# Patient Record
Sex: MALE | Race: WHITE | NOT HISPANIC OR LATINO | Employment: OTHER | URBAN - METROPOLITAN AREA
[De-identification: names, ages, dates, MRNs, and addresses within clinical notes are randomized per-mention and may not be internally consistent; named-entity substitution may affect disease eponyms.]

---

## 2022-08-29 ENCOUNTER — OFFICE VISIT (OUTPATIENT)
Dept: FAMILY MEDICINE CLINIC | Facility: CLINIC | Age: 82
End: 2022-08-29
Payer: COMMERCIAL

## 2022-08-29 VITALS
SYSTOLIC BLOOD PRESSURE: 138 MMHG | DIASTOLIC BLOOD PRESSURE: 84 MMHG | RESPIRATION RATE: 16 BRPM | WEIGHT: 161.4 LBS | HEART RATE: 80 BPM | TEMPERATURE: 96.3 F | HEIGHT: 64 IN | OXYGEN SATURATION: 99 % | BODY MASS INDEX: 27.55 KG/M2

## 2022-08-29 DIAGNOSIS — I10 ESSENTIAL HYPERTENSION: ICD-10-CM

## 2022-08-29 DIAGNOSIS — F41.9 ANXIETY: ICD-10-CM

## 2022-08-29 DIAGNOSIS — E78.49 OTHER HYPERLIPIDEMIA: ICD-10-CM

## 2022-08-29 DIAGNOSIS — S01.01XA LACERATION OF SCALP, INITIAL ENCOUNTER: Primary | ICD-10-CM

## 2022-08-29 PROCEDURE — 1100F PTFALLS ASSESS-DOCD GE2>/YR: CPT | Performed by: NURSE PRACTITIONER

## 2022-08-29 PROCEDURE — 1160F RVW MEDS BY RX/DR IN RCRD: CPT | Performed by: NURSE PRACTITIONER

## 2022-08-29 PROCEDURE — 3288F FALL RISK ASSESSMENT DOCD: CPT | Performed by: NURSE PRACTITIONER

## 2022-08-29 PROCEDURE — 3725F SCREEN DEPRESSION PERFORMED: CPT | Performed by: NURSE PRACTITIONER

## 2022-08-29 PROCEDURE — 99203 OFFICE O/P NEW LOW 30 MIN: CPT | Performed by: NURSE PRACTITIONER

## 2022-08-29 RX ORDER — ATORVASTATIN CALCIUM 80 MG/1
80 TABLET, FILM COATED ORAL DAILY
COMMUNITY
End: 2022-09-13 | Stop reason: SDUPTHER

## 2022-08-29 NOTE — PROGRESS NOTES
Assessment/Plan:    1  Laceration of scalp, initial encounter  -     Suture removal    2  Essential hypertension  Comments:  stable with current regimen    3  Anxiety  Comments:  stable    4  Other hyperlipidemia  Comments:  complaint with statin and tolerating it well        Patient Instructions:  Supportive care discussed and advised  Advised to RTO for any worsening and no improvement  Follow up for no improvement and worsening of conditions  Patient advised and educated when to see immediate medical care  Keep area clean and dry  Return if symptoms worsen or fail to improve  Future Appointments   Date Time Provider Patricia Beltrán   9/13/2022  1:30 PM DO JENNIE Carreon [facility]           Subjective:      Patient ID: Gisselle Ariza is a 80 y o  male  Chief Complaint   Patient presents with    Establish Care     Nm lpn    Suture / Staple Removal     Staple removal in head         Vitals:  /84   Pulse 80   Temp (!) 96 3 °F (35 7 °C)   Resp 16   Ht 5' 4" (1 626 m)   Wt 73 2 kg (161 lb 6 4 oz)   SpO2 99%   BMI 27 70 kg/m²     HPI  New to practice  Personal and family medical history reviewed  Establishing care with Dr Harriett Ramsey on 9/13/2022  Patient fell on 9/21/2022 in parking lot as was trying to pick his wife and had laceration on scalp and 5 stapels placed on scalp and patient is here for removal  Denies any headache, dizziness and discharge from area  Complaint with his chronic medications and tolerating it well            PHQ-2/9 Depression Screening    Little interest or pleasure in doing things: 0 - not at all  Feeling down, depressed, or hopeless: 0 - not at all  PHQ-2 Score: 0  PHQ-2 Interpretation: Negative depression screen             The following portions of the patient's history were reviewed and updated as appropriate: allergies, current medications, past family history, past medical history, past social history, past surgical history and problem list       Review of Systems   Constitutional: Negative  Respiratory: Negative  Cardiovascular: Negative  Genitourinary: Negative  Skin: Positive for wound (laceration)  As noted in HPI     Neurological: Negative  Objective:    Social History     Tobacco Use   Smoking Status Former Smoker   Smokeless Tobacco Never Used       Allergies: No Known Allergies      Current Outpatient Medications   Medication Sig Dispense Refill    ASPIRIN 81 PO Take by mouth      atorvastatin (LIPITOR) 80 mg tablet Take 80 mg by mouth daily      LOSARTAN POTASSIUM PO Take by mouth      Multiple Vitamins-Minerals (PRESERVISION AREDS 2 PO) Take by mouth      sertraline (ZOLOFT) 50 mg tablet Take 50 mg by mouth daily       No current facility-administered medications for this visit  Physical Exam  Constitutional:       Appearance: Normal appearance  HENT:      Head: Normocephalic  Nose: Nose normal    Eyes:      Conjunctiva/sclera: Conjunctivae normal    Cardiovascular:      Rate and Rhythm: Normal rate and regular rhythm  Heart sounds: Normal heart sounds  Pulmonary:      Effort: Pulmonary effort is normal       Breath sounds: Normal breath sounds  Musculoskeletal:         General: No swelling or tenderness  Normal range of motion  Skin:     General: Skin is warm and dry  Findings: Laceration (laceration on scalp with 5 stapel intact and removed without any difficulty in office and area is clean with scabs, and no wound dehiscence noted and healing well  No discharge noted) present  Neurological:      Mental Status: He is alert and oriented to person, place, and time  Psychiatric:         Mood and Affect: Mood normal          Behavior: Behavior normal          Thought Content:  Thought content normal          Judgment: Judgment normal                Suture removal    Date/Time: 8/29/2022 9:50 AM  Performed by: EVELYN Vitale  Authorized by: EVELYN Vitale Universal Protocol:  Consent: Verbal consent obtained  Risks and benefits: risks, benefits and alternatives were discussed  Consent given by: patient  Timeout called at: 8/29/2022 9:50 AM   Patient understanding: patient states understanding of the procedure being performed  Patient consent: the patient's understanding of the procedure matches consent given  Patient identity confirmed: verbally with patient        Patient location:  Clinic  Location:     Location:  [address] [facility] location:  [place]  Procedure details: Tools used: Other (comment) (stapel removal kit)    Wound appearance:  No sign(s) of infection and good wound healing    Number of staples removed:  6  Post-procedure details:     Post-removal:  Antibiotic ointment applied    Patient tolerance of procedure:   Tolerated well, no immediate complications          EVELYN Mendez

## 2022-08-29 NOTE — PATIENT INSTRUCTIONS
Supportive care discussed and advised  Advised to RTO for any worsening and no improvement  Follow up for no improvement and worsening of conditions  Patient advised and educated when to see immediate medical care  Keep area clean and dry

## 2022-09-07 ENCOUNTER — OFFICE VISIT (OUTPATIENT)
Dept: FAMILY MEDICINE CLINIC | Facility: CLINIC | Age: 82
End: 2022-09-07
Payer: COMMERCIAL

## 2022-09-07 VITALS
RESPIRATION RATE: 16 BRPM | DIASTOLIC BLOOD PRESSURE: 90 MMHG | HEART RATE: 78 BPM | TEMPERATURE: 97.5 F | OXYGEN SATURATION: 97 % | HEIGHT: 64 IN | BODY MASS INDEX: 27.49 KG/M2 | WEIGHT: 161 LBS | SYSTOLIC BLOOD PRESSURE: 150 MMHG

## 2022-09-07 DIAGNOSIS — M54.42 ACUTE BILATERAL LOW BACK PAIN WITH BILATERAL SCIATICA: Primary | ICD-10-CM

## 2022-09-07 DIAGNOSIS — M54.41 ACUTE BILATERAL LOW BACK PAIN WITH BILATERAL SCIATICA: Primary | ICD-10-CM

## 2022-09-07 DIAGNOSIS — I10 ESSENTIAL HYPERTENSION: ICD-10-CM

## 2022-09-07 DIAGNOSIS — E78.49 OTHER HYPERLIPIDEMIA: ICD-10-CM

## 2022-09-07 PROCEDURE — 99214 OFFICE O/P EST MOD 30 MIN: CPT | Performed by: NURSE PRACTITIONER

## 2022-09-07 PROCEDURE — 1101F PT FALLS ASSESS-DOCD LE1/YR: CPT | Performed by: NURSE PRACTITIONER

## 2022-09-07 PROCEDURE — 3725F SCREEN DEPRESSION PERFORMED: CPT | Performed by: NURSE PRACTITIONER

## 2022-09-07 RX ORDER — PREDNISONE 10 MG/1
TABLET ORAL
Qty: 20 TABLET | Refills: 0 | Status: SHIPPED | OUTPATIENT
Start: 2022-09-07 | End: 2022-09-17

## 2022-09-07 RX ORDER — BACLOFEN 10 MG/1
10 TABLET ORAL
Qty: 20 TABLET | Refills: 0 | Status: SHIPPED | OUTPATIENT
Start: 2022-09-07

## 2022-09-07 NOTE — PROGRESS NOTES
Assessment/Plan:    1  Acute bilateral low back pain with bilateral sciatica  -     predniSONE 10 mg tablet; 4 tabs x 2 days, 3 tabs x 2 days, 2 tabs x 2 days, 1 tab x 2 days  -     baclofen 10 mg tablet; Take 1 tablet (10 mg total) by mouth daily at bedtime    2  Essential hypertension  Comments:  BP slightly elevated but currently in pain and complaint with medications and follow up scheduled next week and will recheck    3  Other hyperlipidemia  Comments:  complaint with statin and tolerating it well            Patient Instructions: Take prednisone with food in morning and do not take any NSAID's while taking prednisone  Do not drive and operate any machinery after taking muscle relaxant  Supportive care discussed and advised  Advised to RTO for any worsening and no improvement  Follow up for no improvement and worsening of conditions  Patient advised and educated when to see immediate medical care  Return if symptoms worsen or fail to improve  Future Appointments   Date Time Provider Patricia Beltrán   9/13/2022  1:30 PM Epstein Come, DO [facility]           Subjective:      Patient ID: Caridad Paige is a 80 y o  male  Chief Complaint   Patient presents with    Back Pain     wmcma         Vitals:  /90   Pulse 78   Temp 97 5 °F (36 4 °C)   Resp 16   Ht 5' 4" (1 626 m)   Wt 73 kg (161 lb)   SpO2 97%   BMI 27 64 kg/m²     HPI  Patient stated that moved to new house and been lifting up heavy boxes and moving stuff around and started with lower back ache last week and pain radiating down to bilateral legs and also feels discomfort in bilateral calf's  Denies any numbness/tingling/weakness in any extremities  Denies any saddle anaesthesia and urinary/bowel issues  Complaint with medications for chronic illnesses and tolerating it well              PHQ-2/9 Depression Screening    Little interest or pleasure in doing things: 0 - not at all  Feeling down, depressed, or hopeless: 0 - not at all  PHQ-2 Score: 0  PHQ-2 Interpretation: Negative depression screen             The following portions of the patient's history were reviewed and updated as appropriate: allergies, current medications, past family history, past medical history, past social history, past surgical history and problem list       Review of Systems   Constitutional: Negative  HENT: Negative  Respiratory: Negative  Cardiovascular: Negative  Musculoskeletal: Positive for back pain  Neurological: Negative  Psychiatric/Behavioral: Negative  Objective:    Social History     Tobacco Use   Smoking Status Former Smoker   Smokeless Tobacco Never Used       Allergies: No Known Allergies      Current Outpatient Medications   Medication Sig Dispense Refill    ASPIRIN 81 PO Take by mouth      atorvastatin (LIPITOR) 80 mg tablet Take 80 mg by mouth daily      baclofen 10 mg tablet Take 1 tablet (10 mg total) by mouth daily at bedtime 20 tablet 0    LOSARTAN POTASSIUM PO Take by mouth      Multiple Vitamins-Minerals (PRESERVISION AREDS 2 PO) Take by mouth      predniSONE 10 mg tablet 4 tabs x 2 days, 3 tabs x 2 days, 2 tabs x 2 days, 1 tab x 2 days 20 tablet 0    sertraline (ZOLOFT) 50 mg tablet Take 50 mg by mouth daily       No current facility-administered medications for this visit  Physical Exam  Constitutional:       Appearance: Normal appearance  HENT:      Head: Normocephalic  Nose: Nose normal    Eyes:      Conjunctiva/sclera: Conjunctivae normal    Cardiovascular:      Rate and Rhythm: Normal rate and regular rhythm  Heart sounds: Normal heart sounds  Pulmonary:      Effort: Pulmonary effort is normal       Breath sounds: Normal breath sounds  Musculoskeletal:         General: Tenderness (mildly tender on lumbar spine area) present  No swelling  Normal range of motion  Skin:     General: Skin is warm and dry  Findings: No rash     Neurological:      Mental Status: He is alert and oriented to person, place, and time  Psychiatric:         Mood and Affect: Mood normal          Behavior: Behavior normal          Thought Content:  Thought content normal          Judgment: Judgment normal                      EVELYN Galaviz

## 2022-09-07 NOTE — PATIENT INSTRUCTIONS
Take prednisone with food in morning and do not take any NSAID's while taking prednisone  Do not drive and operate any machinery after taking muscle relaxant  Supportive care discussed and advised  Advised to RTO for any worsening and no improvement  Follow up for no improvement and worsening of conditions  Patient advised and educated when to see immediate medical care  Lower Back Exercises   WHAT YOU NEED TO KNOW:   Lower back exercises help heal and strengthen your back muscles to prevent another injury  Ask your healthcare provider if you need to see a physical therapist for more advanced exercises  DISCHARGE INSTRUCTIONS:   Return to the emergency department if:   You have severe pain that prevents you from moving  Contact your healthcare provider if:   Your pain becomes worse  You have new pain  You have questions or concerns about your condition or care  Do lower back exercises safely:   Do the exercises on a mat or firm surface  (not on a bed) to support your spine and prevent low back pain  Move slowly and smoothly  Avoid fast or jerky motions  Breathe normally  Do not hold your breath  Stop if you feel pain  It is normal to feel some discomfort at first  Regular exercise will help decrease your discomfort over time  Lower back exercises: Your healthcare provider may recommend that you do back exercises 10 to 30 minutes each day  He may also recommend that you do exercises 1 to 3 times each day  Ask your healthcare provider which exercises are best for you and how often to do them  Ankle pumps:  Lie on your back  Move your foot up (with your toes pointing toward your head)  Then, move your foot down (with your toes pointing away from you)  Repeat this exercise 10 times on each side  Heel slides:  Lie on your back  Slowly bend one leg and then straighten it  Next, bend the other leg and then straighten it  Repeat 10 times on each side           Pelvic tilt: Lie on your back with your knees bent and feet flat on the floor  Place your arms in a relaxed position beside your body  Tighten the muscles of your abdomen and flatten your back against the floor  Hold for 5 seconds  Repeat 5 times  Back stretch:  Lie on your back with your hands behind your head  Bend your knees and turn the lower half of your body to one side  Hold this position for 10 seconds  Repeat 3 times on each side  Straight leg raises:  Lie on your back with one leg straight  Bend the other knee  Tighten your abdomen and then slowly lift the straight leg up about 6 to 12 inches off the floor  Hold for 1 to 5 seconds  Lower your leg slowly  Repeat 10 times on each leg  Knee-to-chest:  Lie on your back with your knees bent and feet flat on the floor  Pull one of your knees toward your chest and hold it there for 5 seconds  Return your leg to the starting position  Lift the other knee toward your chest and hold for 5 seconds  Do this 5 times on each side  Cat and camel:  Place your hands and knees on the floor  Arch your back upward toward the ceiling and lower your head  Round out your spine as much as you can  Hold for 5 seconds  Lift your head upward and push your chest downward toward the floor  Hold for 5 seconds  Do 3 sets or as directed  Wall squats:  Stand with your back against a wall  Tighten the muscles of your abdomen  Slowly lower your body until your knees are bent at a 45 degree angle  Hold this position for 5 seconds  Slowly move back up to a standing position  Repeat 10 times  Curl up:  Lie on your back with your knees bent and feet flat on the floor  Place your hands, palms down, underneath the curve in your lower back  Next, with your elbows on the floor, lift your shoulders and chest 2 to 3 inches  Keep your head in line with your shoulders  Hold this position for 5 seconds   When you can do this exercise without pain for 10 to 15 seconds, you may add a rotation  While your shoulders and chest are lifted off the ground, turn slightly to the left and hold  Repeat on the other side  Bird dog:  Place your hands and knees on the floor  Keep your wrists directly below your shoulders and your knees directly below your hips  Pull your belly button in toward your spine  Do not flatten or arch your back  Tighten your abdominal muscles  Raise one arm straight out so that it is aligned with your head  Next, raise the leg opposite your arm  Hold this position for 15 seconds  Lower your arm and leg slowly and change sides  Do 5 sets  © Copyright [employer] 2022 Information is for End User's use only and may not be sold, redistributed or otherwise used for commercial purposes  All illustrations and images included in CareNotes® are the copyrighted property of A D A M [employer] Inc  or [employer]   The above information is an  only  It is not intended as medical advice for individual conditions or treatments  Talk to your doctor, nurse or pharmacist before following any medical regimen to see if it is safe and effective for you  Baclofen (By injection)   Baclofen (EUGENIA-ashish-fen)  Treats muscle spasms  This medicine is a muscle relaxer  Brand Name(s): Gablofen, Lioresal Intrathecal, Lioresal Intrathecal Refill Kit   There may be other brand names for this medicine  When This Medicine Should Not Be Used: This medicine is not right for everyone  Do not use it if you had an allergic reaction to baclofen  How to Use This Medicine:   Injectable  Your doctor will prescribe your exact dose  Your dose may need to be changed several times in order to find out what works best for you  This medicine is given through a needle directly into your spinal cord  You will first be given 1 or 2 test doses, to see if this medicine will work for you   A nurse or other trained health professional will give you this test dose in a clinic or hospital  You will need to stay for 4 to 8 hours to make sure the medicine does not cause any problems  If your muscle spasms get better with the test dose, you may be able to start using this medicine all the time  You will need to have a pump placed under your skin, which will pump medicine directly into your back all day  This medicine should come with a Medication Guide  Ask your pharmacist for a copy if you do not have one  Your family or other caregivers need to know how to use your pump and medicine  Make sure you and your caregivers all know these 3 things: (1) Signs that you are getting too much or too little medicine  (2) What to do if you have any of these signs  (3) How to care for the pump and the needle that is placed in your back  Missed dose: You must use this medicine on a fixed schedule  Call your doctor or pharmacist if you miss a dose  Drugs and Foods to Avoid:   Ask your doctor or pharmacist before using any other medicine, including over-the-counter medicines, vitamins, and herbal products  Some medicines can affect how baclofen works  Tell your doctor if you use anything else that makes you sleepy  Some examples are allergy medicine, narcotic pain medicine, and alcohol  Warnings While Using This Medicine:   Tell your doctor if you are pregnant or breastfeeding, or if you have kidney disease or any kind of infection  Tell your doctor if you used baclofen before, and if you had problems when you stopped using it  Ask your doctor for an Emergency Card listing the symptoms that may happen if you get too much or too little medicine  Carry the card with you at all times  Learn what the pump alarm sounds like and what to do if the alarm goes off  Do not stop taking this medicine suddenly without asking your doctor  You may need to decrease your dose slowly before you stop taking it completely  Your doctor will need to check your progress at regular visits while you are using this medicine   Be sure to keep all appointments  This medicine may make you dizzy or drowsy  Do not drive or do anything that could be dangerous until you know how this medicine affects you  Keep all medicine out of the reach of children  Never share your medicine with anyone  Possible Side Effects While Using This Medicine:   Call your doctor right away if you notice any of these side effects: Allergic reaction: Itching or hives, swelling in your face or hands, swelling or tingling in your mouth or throat, chest tightness, trouble breathing  Decrease in how much or how often you urinate  Fainting, shallow breathing, seizures  High fever, increased muscle spasms or stiffness, confusion, lightheadedness, numbness, tingling  Muscle weakness  Pain, redness, or swelling around the pump or needle  Unusual sleepiness, tiredness, or lightheadedness  If you notice these less serious side effects, talk with your doctor:   Agitation, mild headache  Nausea, vomiting, constipation  If you notice other side effects that you think are caused by this medicine, tell your doctor  Call your doctor for medical advice about side effects  You may report side effects to FDA at 6-941-FDA-6801    © Copyright [address] 2022 Information is for End User's use only and may not be sold, redistributed or otherwise used for commercial purposes  The above information is an  only  It is not intended as medical advice for individual conditions or treatments  Talk to your doctor, nurse or pharmacist before following any medical regimen to see if it is safe and effective for you  Prednisone (By mouth)   Prednisone (PRED-ni-sone)  Treats many diseases and conditions, especially problems related to inflammation  This medicine is a corticosteroid  Brand Name(s): Evin, predniSONE Intensol   There may be other brand names for this medicine  When This Medicine Should Not Be Used: This medicine is not right for everyone   Do not use if you had an allergic reaction to prednisone or if you are pregnant  How to Use This Medicine:   Liquid, Tablet, Delayed Release Tablet  Take your medicine as directed  Your dose may need to be changed several times to find what works best for you  It is best to take this medicine with food or milk  Swallow the delayed-release tablet whole  Do not crush, break, or chew it  Measure the oral liquid medicine with a marked measuring spoon, oral syringe, or medicine cup  Missed dose: Take a dose as soon as you remember  If it is almost time for your next dose, wait until then and take a regular dose  Do not take extra medicine to make up for a missed dose  Store the medicine in a closed container at room temperature, away from heat, moisture, and direct light  Do not freeze the oral liquid  Drugs and Foods to Avoid:   Ask your doctor or pharmacist before using any other medicine, including over-the-counter medicines, vitamins, and herbal products  Tell your doctor if you use any of the following:  Aminoglutethimide, amphotericin B, carbamazepine, cholestyramine, cyclosporine, digoxin, isoniazid, ketoconazole, phenobarbital, phenytoin, or rifampin  Blood thinner, such as warfarin  NSAID pain or arthritis medicine, such as aspirin, diclofenac, ibuprofen, naproxen, celecoxib  Diuretic (water pill)  Diabetes medicine  Macrolide antibiotic, such as azithromycin, clarithromycin, erythromycin  Estrogen, including birth control pills or hormone replacement therapy  This medicine may interfere with vaccines  Ask your doctor before you get a flu shot or any other vaccines  Warnings While Using This Medicine: It is not safe to take this medicine during pregnancy  It could harm an unborn baby  Tell your doctor right away if you become pregnant  Tell your doctor if you are breastfeeding or if you have kidney problems, heart failure, high blood pressure, a recent heart attack, diabetes, glaucoma, osteoporosis, or thyroid problems   Tell your doctor about any infection you have  Also tell your doctor if you have had mental or emotional problems (such as depression) or stomach or bowel problems (such as an ulcer or diverticulitis)  This medicine may cause the following problems:  Mood or behavior changes  Higher blood pressure, retaining water, changes in salt or potassium levels in your body  Cataracts or glaucoma (with long-term use)  Weak bones or osteoporosis (with long-term use)  Slow growth in children (with long-term use)  Muscle problems (with high doses, especially if you have myasthenia gravis or similar nerve and muscle problems)  Do not stop using this medicine suddenly  Your doctor will need to slowly decrease your dose before you stop it completely  This medicine could cause you to get infections more easily  Tell your doctor right away if you are exposed to chicken pox, measles, or other serious infection  Tell your doctor if you had a serious infection in the past, such as tuberculosis or herpes  Tell your doctor about any extra stress or anxiety in your life  Your dose might need to be changed for a short time  Tell any doctor or dentist who treats you that you are using this medicine  This medicine may affect certain medical test results  Keep all medicine out of the reach of children  Never share your medicine with anyone  Possible Side Effects While Using This Medicine:   Call your doctor right away if you notice any of these side effects:   Allergic reaction: Itching or hives, swelling in your face or hands, swelling or tingling in your mouth or throat, chest tightness, trouble breathing  Dark freckles, skin color changes, coldness, weakness, tiredness, nausea, vomiting, weight loss  Depression, unusual thoughts, feelings, or behaviors, trouble sleeping  Fever, chills, cough, sore throat, and body aches  Muscle pain or weakness  Rapid weight gain, swelling in your hands, ankles, or feet  Severe stomach pain, nausea, vomiting, or red or black stools  Skin changes or growths  Trouble seeing, eye pain, headache  If you notice these less serious side effects, talk with your doctor: Increased appetite  Round, puffy face  Weight gain around your neck, upper back, breast, face, or waist  If you notice other side effects that you think are caused by this medicine, tell your doctor  Call your doctor for medical advice about side effects  You may report side effects to FDA at 3-503-CMR-8443    © Copyright [employer] 2022 Information is for End User's use only and may not be sold, redistributed or otherwise used for commercial purposes  The above information is an  only  It is not intended as medical advice for individual conditions or treatments  Talk to your doctor, nurse or pharmacist before following any medical regimen to see if it is safe and effective for you

## 2022-09-13 ENCOUNTER — OFFICE VISIT (OUTPATIENT)
Dept: FAMILY MEDICINE CLINIC | Facility: CLINIC | Age: 82
End: 2022-09-13
Payer: COMMERCIAL

## 2022-09-13 VITALS
BODY MASS INDEX: 28.1 KG/M2 | DIASTOLIC BLOOD PRESSURE: 74 MMHG | HEART RATE: 94 BPM | HEIGHT: 64 IN | RESPIRATION RATE: 16 BRPM | TEMPERATURE: 96.9 F | OXYGEN SATURATION: 97 % | WEIGHT: 164.6 LBS | SYSTOLIC BLOOD PRESSURE: 136 MMHG

## 2022-09-13 DIAGNOSIS — Z13.6 SCREENING FOR CARDIOVASCULAR CONDITION: ICD-10-CM

## 2022-09-13 DIAGNOSIS — I25.10 CORONARY ARTERY DISEASE INVOLVING NATIVE CORONARY ARTERY OF NATIVE HEART WITHOUT ANGINA PECTORIS: ICD-10-CM

## 2022-09-13 DIAGNOSIS — Z11.59 NEED FOR HEPATITIS C SCREENING TEST: ICD-10-CM

## 2022-09-13 DIAGNOSIS — E78.2 MIXED HYPERLIPIDEMIA: ICD-10-CM

## 2022-09-13 DIAGNOSIS — Z12.5 SCREENING FOR PROSTATE CANCER: ICD-10-CM

## 2022-09-13 DIAGNOSIS — Z87.898 H/O BRAIN TUMOR: ICD-10-CM

## 2022-09-13 DIAGNOSIS — F41.9 ANXIETY: ICD-10-CM

## 2022-09-13 DIAGNOSIS — Z85.51 HISTORY OF BLADDER CANCER: ICD-10-CM

## 2022-09-13 DIAGNOSIS — I10 ESSENTIAL HYPERTENSION: Primary | ICD-10-CM

## 2022-09-13 DIAGNOSIS — M54.16 LUMBAR RADICULOPATHY, ACUTE: ICD-10-CM

## 2022-09-13 PROBLEM — Z95.1 H/O CORONARY ARTERY BYPASS SURGERY: Status: ACTIVE | Noted: 2022-09-13

## 2022-09-13 PROCEDURE — 99214 OFFICE O/P EST MOD 30 MIN: CPT | Performed by: FAMILY MEDICINE

## 2022-09-13 RX ORDER — PREDNISONE 20 MG/1
TABLET ORAL
Qty: 32 TABLET | Refills: 0 | Status: SHIPPED | OUTPATIENT
Start: 2022-09-13

## 2022-09-13 RX ORDER — ATORVASTATIN CALCIUM 80 MG/1
80 TABLET, FILM COATED ORAL DAILY
Qty: 90 TABLET | Refills: 1 | Status: SHIPPED | OUTPATIENT
Start: 2022-09-13

## 2022-09-13 NOTE — PATIENT INSTRUCTIONS
Weight Management   AMBULATORY CARE:   Why it is important to manage your weight:  Being overweight increases your risk of health conditions such as heart disease, high blood pressure, type 2 diabetes, and certain types of cancer  It can also increase your risk for osteoarthritis, sleep apnea, and other respiratory problems  Aim for a slow, steady weight loss  Even a small amount of weight loss can lower your risk of health problems  Risks of being overweight:  Extra weight can cause many health problems, including the following:  · Diabetes (high blood sugar level)    · High blood pressure or high cholesterol    · Heart disease    · Stroke    · Gallbladder or liver disease    · Cancer of the colon, breast, prostate, liver, or kidney    · Sleep apnea    · Arthritis or gout    Screening  is done to check for health conditions before you have signs or symptoms  If you are 28to 79years old, your blood sugar level may be checked every 3 years for signs of prediabetes or diabetes  Your healthcare provider will check your blood pressure at each visit  High blood pressure can lead to a stroke or other problems  Your provider may check for signs of heart disease, cancer, or other health problems  How to lose weight safely:  A safe and healthy way to lose weight is to eat fewer calories and get regular exercise  · You can lose up about 1 pound a week by decreasing the number of calories you eat by 500 calories each day  You can decrease calories by eating smaller portion sizes or by cutting out high-calorie foods  Read labels to find out how many calories are in the foods you eat  · You can also burn calories with exercise such as walking, swimming, or biking  You will be more likely to keep weight off if you make these changes part of your lifestyle  Exercise at least 30 minutes per day on most days of the week   You can also fit in more physical activity by taking the stairs instead of the elevator or parking farther away from stores  Ask your healthcare provider about the best exercise plan for you  Healthy meal plan for weight management:  A healthy meal plan includes a variety of foods, contains fewer calories, and helps you stay healthy  A healthy meal plan includes the following:     · Eat whole-grain foods more often  A healthy meal plan should contain fiber  Fiber is the part of grains, fruits, and vegetables that is not broken down by your body  Whole-grain foods are healthy and provide extra fiber in your diet  Some examples of whole-grain foods are whole-wheat breads and pastas, oatmeal, brown rice, and bulgur  · Eat a variety of vegetables every day  Include dark, leafy greens such as spinach, kale, sumit greens, and mustard greens  Eat yellow and orange vegetables such as carrots, sweet potatoes, and winter squash  · Eat a variety of fruits every day  Choose fresh or canned fruit (canned in its own juice or light syrup) instead of juice  Fruit juice has very little or no fiber  · Eat low-fat dairy foods  Drink fat-free (skim) milk or 1% milk  Eat fat-free yogurt and low-fat cottage cheese  Try low-fat cheeses such as mozzarella and other reduced-fat cheeses  · Choose meat and other protein foods that are low in fat  Choose beans or other legumes such as split peas or lentils  Choose fish, skinless poultry (chicken or turkey), or lean cuts of red meat (beef or pork)  Before you cook meat or poultry, cut off any visible fat  · Use less fat and oil  Try baking foods instead of frying them  Add less fat, such as margarine, sour cream, regular salad dressing and mayonnaise to foods  Eat fewer high-fat foods  Some examples of high-fat foods include french fries, doughnuts, ice cream, and cakes  · Eat fewer sweets  Limit foods and drinks that are high in sugar  This includes candy, cookies, regular soda, and sweetened drinks  Ways to decrease calories:   · Eat smaller portions  ? Use a small plate with smaller servings  ? Do not eat second helpings  ? When you eat at a restaurant, ask for a box and place half of your meal in the box before you eat  ? Share an entrée with someone else  · Replace high-calorie snacks with healthy, low-calorie snacks  ? Choose fresh fruit, vegetables, fat-free rice cakes, or air-popped popcorn instead of potato chips, nuts, or chocolate  ? Choose water or calorie-free drinks instead of soda or sweetened drinks  · Do not shop for groceries when you are hungry  You may be more likely to make unhealthy food choices  Take a grocery list of healthy foods and shop after you have eaten  · Eat regular meals  Do not skip meals  Skipping meals can lead to overeating later in the day  This can make it harder for you to lose weight  Eat a healthy snack in place of a meal if you do not have time to eat a regular meal  Talk with a dietitian to help you create a meal plan and schedule that is right for you  Other things to consider as you try to lose weight:   · Be aware of situations that may give you the urge to overeat, such as eating while watching television  Find ways to avoid these situations  For example, read a book, go for a walk, or do crafts  · Meet with a weight loss support group or friends who are also trying to lose weight  This may help you stay motivated to continue working on your weight loss goals  © Copyright [employer] 2022 Information is for End User's use only and may not be sold, redistributed or otherwise used for commercial purposes  All illustrations and images included in CareNotes® are the copyrighted property of A D A M , Inc  or [facility] Neisha Luz   The above information is an  only  It is not intended as medical advice for individual conditions or treatments  Talk to your doctor, nurse or pharmacist before following any medical regimen to see if it is safe and effective for you

## 2022-09-13 NOTE — PROGRESS NOTES
Assessment/Plan:    1  Essential hypertension  -     CBC; Future    2  Mixed hyperlipidemia  -     CBC; Future  -     Comprehensive metabolic panel; Future  -     Lipid Panel with Direct LDL reflex; Future  -     atorvastatin (LIPITOR) 80 mg tablet; Take 1 tablet (80 mg total) by mouth daily    3  Anxiety  -     CBC; Future    4  Screening for prostate cancer  -     CBC; Future  -     PSA, Total Screen; Future    5  Need for hepatitis C screening test  -     CBC; Future  -     Hepatitis C antibody; Future    6  Screening for cardiovascular condition  -     CBC; Future    7  BMI 28 0-28 9,adult  -     CBC; Future    8  History of bladder cancer  -      bladder; Future; Expected date: 09/13/2022    9  Coronary artery disease involving native coronary artery of native heart without angina pectoris  -     Ambulatory Referral to Cardiology; Future  -     atorvastatin (LIPITOR) 80 mg tablet; Take 1 tablet (80 mg total) by mouth daily    10  H/O brain tumor    11  Lumbar radiculopathy, acute  -     predniSONE 20 mg tablet; 4 tabs for three days, 3 tabs for three days, 2 tabs for three days, 1 tab for three days, 1/2 tab for 4 days  -     Ambulatory Referral to Physical Therapy; Future          Patient Instructions     Weight Management   AMBULATORY CARE:   Why it is important to manage your weight:  Being overweight increases your risk of health conditions such as heart disease, high blood pressure, type 2 diabetes, and certain types of cancer  It can also increase your risk for osteoarthritis, sleep apnea, and other respiratory problems  Aim for a slow, steady weight loss  Even a small amount of weight loss can lower your risk of health problems    Risks of being overweight:  Extra weight can cause many health problems, including the following:  Diabetes (high blood sugar level)    High blood pressure or high cholesterol    Heart disease    Stroke    Gallbladder or liver disease    Cancer of the colon, breast, prostate, liver, or kidney    Sleep apnea    Arthritis or gout    Screening  is done to check for health conditions before you have signs or symptoms  If you are 28to 79years old, your blood sugar level may be checked every 3 years for signs of prediabetes or diabetes  Your healthcare provider will check your blood pressure at each visit  High blood pressure can lead to a stroke or other problems  Your provider may check for signs of heart disease, cancer, or other health problems  How to lose weight safely:  A safe and healthy way to lose weight is to eat fewer calories and get regular exercise  You can lose up about 1 pound a week by decreasing the number of calories you eat by 500 calories each day  You can decrease calories by eating smaller portion sizes or by cutting out high-calorie foods  Read labels to find out how many calories are in the foods you eat  You can also burn calories with exercise such as walking, swimming, or biking  You will be more likely to keep weight off if you make these changes part of your lifestyle  Exercise at least 30 minutes per day on most days of the week  You can also fit in more physical activity by taking the stairs instead of the elevator or parking farther away from stores  Ask your healthcare provider about the best exercise plan for you  Healthy meal plan for weight management:  A healthy meal plan includes a variety of foods, contains fewer calories, and helps you stay healthy  A healthy meal plan includes the following:     Eat whole-grain foods more often  A healthy meal plan should contain fiber  Fiber is the part of grains, fruits, and vegetables that is not broken down by your body  Whole-grain foods are healthy and provide extra fiber in your diet  Some examples of whole-grain foods are whole-wheat breads and pastas, oatmeal, brown rice, and bulgur  Eat a variety of vegetables every day    Include dark, leafy greens such as spinach, kale, sumit greens, and mustard greens  Eat yellow and orange vegetables such as carrots, sweet potatoes, and winter squash  Eat a variety of fruits every day  Choose fresh or canned fruit (canned in its own juice or light syrup) instead of juice  Fruit juice has very little or no fiber  Eat low-fat dairy foods  Drink fat-free (skim) milk or 1% milk  Eat fat-free yogurt and low-fat cottage cheese  Try low-fat cheeses such as mozzarella and other reduced-fat cheeses  Choose meat and other protein foods that are low in fat  Choose beans or other legumes such as split peas or lentils  Choose fish, skinless poultry (chicken or turkey), or lean cuts of red meat (beef or pork)  Before you cook meat or poultry, cut off any visible fat  Use less fat and oil  Try baking foods instead of frying them  Add less fat, such as margarine, sour cream, regular salad dressing and mayonnaise to foods  Eat fewer high-fat foods  Some examples of high-fat foods include french fries, doughnuts, ice cream, and cakes  Eat fewer sweets  Limit foods and drinks that are high in sugar  This includes candy, cookies, regular soda, and sweetened drinks  Ways to decrease calories:   Eat smaller portions  Use a small plate with smaller servings  Do not eat second helpings  When you eat at a restaurant, ask for a box and place half of your meal in the box before you eat  Share an entrée with someone else  Replace high-calorie snacks with healthy, low-calorie snacks  Choose fresh fruit, vegetables, fat-free rice cakes, or air-popped popcorn instead of potato chips, nuts, or chocolate  Choose water or calorie-free drinks instead of soda or sweetened drinks  Do not shop for groceries when you are hungry  You may be more likely to make unhealthy food choices  Take a grocery list of healthy foods and shop after you have eaten  Eat regular meals  Do not skip meals  Skipping meals can lead to overeating later in the day  This can make it harder for you to lose weight  Eat a healthy snack in place of a meal if you do not have time to eat a regular meal  Talk with a dietitian to help you create a meal plan and schedule that is right for you  Other things to consider as you try to lose weight:   Be aware of situations that may give you the urge to overeat, such as eating while watching television  Find ways to avoid these situations  For example, read a book, go for a walk, or do crafts  Meet with a weight loss support group or friends who are also trying to lose weight  This may help you stay motivated to continue working on your weight loss goals  © Copyright [employer] 2022 Information is for End User's use only and may not be sold, redistributed or otherwise used for commercial purposes  All illustrations and images included in CareNotes® are the copyrighted property of A D A M , Inc  or [employer]   The above information is an  only  It is not intended as medical advice for individual conditions or treatments  Talk to your doctor, nurse or pharmacist before following any medical regimen to see if it is safe and effective for you  Return for AWV in december  Subjective:      Patient ID: Blanca Cowden is a 80 y o  male  Chief Complaint   Patient presents with   Lillian Guerrero [facility] lpn       Pt is here for the first time with me to establish  Pt had an AWV in October    Pt was taking prednisone and baclofen - just moved and he hurt his back  Pt states the pain rad - starts in his back and goes to hips and calves and feet  No weakness  No loss of bowel or bladder control  Pain is 7-8/10    Pt has an old roataor cuff tear - happened 5 or 6 years ago    Still gets chronic pain in the shoulder    Last colon was quite some time ago and not interested in further colon studies    Pt states a long time ago 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 but he is not sure, followed a urologist and seems to have been cleared - that doc retired      The following portions of the patient's history were reviewed and updated as appropriate: allergies, current medications, past family history, past medical history, past social history, past surgical history and problem list     Review of Systems      Current Outpatient Medications   Medication Sig Dispense Refill    ASPIRIN 81 PO Take by mouth      atorvastatin (LIPITOR) 80 mg tablet Take 1 tablet (80 mg total) by mouth daily 90 tablet 1    baclofen 10 mg tablet Take 1 tablet (10 mg total) by mouth daily at bedtime 20 tablet 0    LOSARTAN POTASSIUM PO Take by mouth      Multiple Vitamins-Minerals (PRESERVISION AREDS 2 PO) Take by mouth      predniSONE 10 mg tablet 4 tabs x 2 days, 3 tabs x 2 days, 2 tabs x 2 days, 1 tab x 2 days 20 tablet 0    predniSONE 20 mg tablet 4 tabs for three days, 3 tabs for three days, 2 tabs for three days, 1 tab for three days, 1/2 tab for 4 days 32 tablet 0    sertraline (ZOLOFT) 50 mg tablet Take 50 mg by mouth daily       No current facility-administered medications for this visit  Objective:    /74   Pulse 94   Temp (!) 96 9 °F (36 1 °C)   Resp 16   Ht 5' 4" (1 626 m)   Wt 74 7 kg (164 lb 9 6 oz)   SpO2 97%   BMI 28 25 kg/m²        Physical Exam           Klaudia Garland DO  BMI Counseling: Body mass index is 28 25 kg/m²  The BMI is above normal  No BMI follow-up plan is appropriate  Patient is currently receiving palliative care  07:30

## 2022-09-14 ENCOUNTER — TELEPHONE (OUTPATIENT)
Dept: FAMILY MEDICINE CLINIC | Facility: CLINIC | Age: 82
End: 2022-09-14

## 2022-09-14 NOTE — TELEPHONE ENCOUNTER
----- Message from Kvng Davis DO sent at 9/13/2022  1:42 PM EDT -----  Regarding: records  Pt old PCP - James Triana in [facility]  Can we get last AWV  Vaccines  Last set of labs

## 2022-09-15 ENCOUNTER — TELEPHONE (OUTPATIENT)
Dept: ADMINISTRATIVE | Facility: OTHER | Age: 82
End: 2022-09-15

## 2022-09-15 NOTE — TELEPHONE ENCOUNTER
----- Message from [facility] sent at 9/14/2022  3:04 PM EDT -----  09/14/22 3:06 PM    Hello, our patient Sherine Barreto has had Immunization(s) and Medicare AWV completed/performed  Please assist in updating the patient chart by making an External outreach to [facility] located in [facility]  The date of service is within the last year,also the last set of lab work done too      Thank you,  Shannon Gann  [facility]

## 2022-09-15 NOTE — TELEPHONE ENCOUNTER
Upon review of the In Basket request and the patient's chart, initial outreach has been made via telephone call, please see Contacts section for details       Thank you  Lauri Nguyen MA

## 2022-09-15 NOTE — LETTER
Vaccination Request Form: Immunization Record      Date Requested: 22  Patient: Wesley Shutters  Patient : 1940   Referring Provider: Crorine Carballo DO       The above patient has informed us that they have had their   most recent Immunization Record administered at your facility  Please   complete this form and attach all corresponding documentation  Date of Vaccine(s) Given  ______________________________    Lot Number(s) _______________________________________    Manufacture(s) ______________________________________    Dose Amount (s) _____________________________________    Expiration Date(s) ____________________________________    Comments __________________________________________________________  ____________________________________________________________________  ____________________________________________________________________  ____________________________________________________________________    Administering Facility  ________________________________________________    Vaccine Administered By (print name) ___________________________________      Form Completed By (print name) _______________________________________      Signature ___________________________________________________________      These reports are needed for  compliance  Please fax this completed form and a copy of the Vaccine Document(s) to our office located at [place] [address] as soon as possible to [phone] attention Zohra: Phone [phone]    We thank you for your assistance in treating our mutual patient     (sent to [facility] on be behalf of [address] [place], [place])            Procedure Request Form: Medicare Annual Wellness Visit (AWV)      Date Requested: 22  Patient: Wesley Blount  Patient : 1940   Referring Provider: Corrine Carballo DO        Date of Procedure ______________________________       The above patient has informed us that they have completed their   most recent Medicare Annual Wellness Visit (AWV) at your facility  Please complete   this form and attach all corresponding procedure reports/results  Comments __________________________________________________________  ____________________________________________________________________  ____________________________________________________________________  ____________________________________________________________________    Facility Completing Procedure _________________________________________    Form Completed By (print name) _______________________________________      Signature __________________________________________________________      These reports are needed for  compliance  Please fax this completed form and a copy of the procedure report to our office located at [address] as soon as possible to [phone] attention Zohra: Phone [phone]    We thank you for your assistance in treating our mutual patient  (sent to [facility] on be behalf of [address] [place] [place], [address] [place] [address] [place])                          Lab Result(s) Request Form: Hemoglobin A1c and Urine Microalbumin or Protein Creatinine Ratio      Date Requested: 22  Patient: Ike Mayo  Patient : 1940   Referring Provider: Stalin Fuentes DO        Date of Lab Collection ______________________________       The above patient has informed us that they have completed their   most recent Hemoglobin A1c and Urine Microalbumin or Protein Creatinine Ratio at your facility  Please complete   this form and attach all corresponding procedure reports/results      Comments __________________________________________________________  ____________________________________________________________________  ____________________________________________________________________  ____________________________________________________________________    Collecting/Resulting Facility  ___________________________________________  Form Completed By (print name) ________________________________________    Signature ___________________________________________________________      These reports are needed for  compliance  Please fax this completed form and a copy of the lab result(s)/ report(s) to our office located at [place] [address] as soon as possible to [phone] attention Zohra: Phone [phone]    We thank you for your assistance in treating our mutual patient     (sent to [facility] on be behalf of [address] Roberto Laughlin, [address] [place] [address] [place])

## 2022-09-19 ENCOUNTER — EVALUATION (OUTPATIENT)
Dept: PHYSICAL THERAPY | Facility: CLINIC | Age: 82
End: 2022-09-19
Payer: COMMERCIAL

## 2022-09-19 DIAGNOSIS — M54.16 LUMBAR RADICULOPATHY, ACUTE: ICD-10-CM

## 2022-09-19 PROCEDURE — 97112 NEUROMUSCULAR REEDUCATION: CPT

## 2022-09-19 PROCEDURE — 97162 PT EVAL MOD COMPLEX 30 MIN: CPT

## 2022-09-19 NOTE — PROGRESS NOTES
PT Evaluation   Today's date: 2022  Patient name: Viky Sanchez  : 1940  MRN: [id number]  Referring provider: Paige Rizo DO  Dx:   Encounter Diagnosis     ICD-10-CM    1  Lumbar radiculopathy, acute  M54 16 Ambulatory Referral to Physical Therapy       Assessment  Assessment details: Patient is a 80 y o  Male who presents with referring diagnosis of lumbar radiculopathy  Patient's greatest concern is worry over not knowing what's wrong, fear of not being able to keep active and future ill health (and wanting to prevent it)  Primary movement impairment diagnosis of hypomobile lumbar spine resulting in pathoanatomical symptoms of radiculopathy, flexion preference, core weakness  The aforementioned impairments have limited the patient's ability to walk, standing, stairs and curbs  No further referral appears necessary at this time based upon examination results    Patient education performed during today's session included: prognosis and diagnosis, HEP, PT expectations, Posture, Heat and Ice    Primary movement impairments:  1) Flexion preference  2) Hypomobile lumbar spine    Etiological factors include: none        Impairments: Abnormal gait, Abnormal muscle tone, Abnormal or restricted ROM, Activity intolerance, Impaired balance, Impaired physical strength, Lacks appropriate HEP, Poor posture, Poor body mechanics and Pain with function  Understanding of Dx/Px/POC: Good  Prognosis: Good   Positive prognostic factors: positive attitude toward recovery and acuity of symptoms   Negative prognostic factors: hypertension, high symptom irritability, central sensitization, degree of peripheralization and multiple concurrent orthopedic problems    Patient verbalized understanding of POC  Please contact me if you have any questions or recommendations   Thank you for the referral and the opportunity to share in [facility] care         Plan  Plan details: Joint mobilizations, STM/IASTM, Strengthening, Motor coordination training, Gait training, Muscle flexibility, Functional lifting, Balance training and Posture education    Patient would benefit from: skilled physical therapy  Planned modality interventions: Cryotherapy and Thermotherapy: Hydrocollator Packs  Planned therapy interventions: activity modification, joint mobilization, manual therapy, motor coordination training, neuromuscular re-education, patient education, postural training, self care, therapeutic activities, therapeutic exercise, graded activity, graded exercise, home exercise program, balance, behavior modification and transfer training  Frequency: 2x/week  Duration in weeks: 8  Plan of Care beginning date: 9/19/2022  Plan of Care expiration date: 12 weeks - 12/12/2022  Treatment plan discussed with: Patient       Goals  Short Term Goals (4 weeks):    - Patient will be independent in basic HEP 2-3 weeks  - Patient will report >50% reduction in pain  - Patient will demonstrate >1/3 improvement in MMT grade as applicable  - Demonstrate consistent posture corrections without cueing during therapeutic exercise    Long Term Goals (8 weeks):  - Patient will be independent in a comprehensive home exercise program  - Patient FOTO score will improve to 70/100  - Patient will self-report >75% improvement in function  - Patient will walk 30 min for community ambulation  - Patient will lift 20# with good form      Subjective    History of Present Illness  - Mechanism of injury: pain going down the hip and into the back of the leg  Started about 3 weeks ago  He just moved to the area  Lifting a lot when this all started  Increased by stairs, curbs, walking to 15 min, lifting  Decreased with sitting  Goes down the L>R goes into the foot  Took prednisone and today is the first day it seems to be helping   Heat and ice are both used and tylenol and a muscle relaxer    - Primary AD: none  - Occupation: retired CPA      Pain  - Current pain ratin/10  - At best pain ratin/10  - At worst pain ratin-6/10  - Location: left hip  - Aggravating factors: walking, curbs, stairs    Social Support  - Stairs in house: 1 step   - Bedroom/bathroom set up: yes  - Lives with: wife, unable to assist       Objective     Red Flag Screening  - Positive for: age >47 years old, history of cancer and bladder dysfunction    - Negative for: fever, chills, night sweats, weight loss, recent infection, immunosuppression, rest/night pain, saddle anesthesia and LE neurological deficits     Postural Assessment  - Posture in Standing: slouched  - Posture in Standing: flexed  - Postural Correction: increased pulling in LLE    Sensation  - Light touch: intact  - Reflexes:   Left: Patellar: 3+  Achilles: 3+   Right: Patellar: 3+  Achilles: 3+   - Upper Motor Neuron Signs: negative clonus     LE MMT  LEFT   RIGHT  -Hip Flexion:   5  5  -Hip Extension:  3+  3+  -Hip Abduction: 5  5  -Hip Adduction: 5  5  -Hip IR:  3+  3+  -Hip ER:  3+  3+    -Knee Flexion  5  5  -Knee Extension 5  5    -Ankle DF  5  5  -Ankle PF  5  5    -Great Toe Extension 5  5    -TA/Lower Abdominal: 3-       Lumbar Spine Range of Motion  Flexion: Moderately limited with pain  Extension: Moderately limited with pain  Right side bending: No limitation without pain  Left side bending: No limitation with pain  Right rotation: No limitation without pain  Left rotation: No limitation without pain      Hip Range of Motion    LEFT  RIGHT  - Flexion WFL  WFL  - IR  WFL  WFL  - ER  WFL  WFL  - Extension WFL  WFL    Mechanical Assessment  Sustained Positions: 2 minutes  -Flexion: Decreasing and Better  -Extension: No Better and Increasing    Repeated motions  -Flexion: No Effect  - Standing lateral shift Right - worse  - Standing lateral shift Left - worse    Joint Play  T10: Hypomobile  T11: Hypomobile  T12: Hypomobile  L1: Hypomobile and Pain  L2: Hypomobile and Pain  L3: Hypomobile and Pain  L4: Hypomobile and Pain  L5: Hypomobile and Pain    Palpation  (+) TTP of Sacrum, Right SIJ and Left SIJ      Functional Assessment  -Gait Assessment:   Gait deviations Antalgic     Patient educated on pros and cons of grade 5 mobilization, patient verbally agreed  Gr 5 mobilization increased radicular sx  Insurance:  AMA/CMS Eval/ Re-eval POC expires Maegan Doran #/ Referral # Total units  Start date  Expiration date Extension  Visit limitation? PT only or  PT+OT? [facility] 9/19/2022 12 weeks - 12/12/2022 52        35$ copay                                                                   Date 9/19              Units:  Used 1              Authed:  Remaining                     Date               Units:  Used               Authed:  Remaining                      Date 9/19/2022        Visit Number IE        Manual         [place] roll Performed B        CPA                           Neuro Re-Ed         Sympathetic downregulation         Sustained extension 2' P! Sustained flexion 2x2'        Lateral shift x10 ea R and L                                                              TherEx         Marching         Bridges         Squats                                             TherAct         Patient education         Functional lifting                                    Gait Training                                    Modalities         CP               Precautions: none  History reviewed  No pertinent past medical history

## 2022-09-20 NOTE — TELEPHONE ENCOUNTER
As a follow-up, a second attempt has been made for outreach via fax, please see Contacts section for details      Thank you  Greta Ocampo MA

## 2022-09-21 NOTE — TELEPHONE ENCOUNTER
Upon review of the In Basket request we outreached for [facility] and did not receive an A1C or a Microalbumin  Any additional questions or concerns should be emailed to the Practice Liaisons via [email or web address] email, please do not reply via In Basket      Thank you  Nevaeh Dickens MA

## 2022-09-23 ENCOUNTER — OFFICE VISIT (OUTPATIENT)
Dept: PHYSICAL THERAPY | Facility: CLINIC | Age: 82
End: 2022-09-23
Payer: COMMERCIAL

## 2022-09-23 DIAGNOSIS — M54.16 LUMBAR RADICULOPATHY, ACUTE: Primary | ICD-10-CM

## 2022-09-23 PROCEDURE — 97110 THERAPEUTIC EXERCISES: CPT

## 2022-09-23 PROCEDURE — 97530 THERAPEUTIC ACTIVITIES: CPT

## 2022-09-23 NOTE — PROGRESS NOTES
Daily Note     Today's date: 2022  Patient name: Harsha Roth  : 1940  MRN: [id number]  Referring provider: Analy Green DO  Dx:   Encounter Diagnosis     ICD-10-CM    1  Lumbar radiculopathy, acute  M54 16                   Subjective: Patient reports pain into the hip on presentation, has not noted any changes in his pain since IE      Objective: See treatment diary below      Assessment: Tolerated treatment fair  Patient increased pain and peripheralization with standing lateral shift correction  Pain decreased and centralized in supine flexion rotation, but did not maintain when returned to a weightbearing posture  By 3rd set patient was able to sit without peripheralization, however centralized upon standing within 1 minute  Flexion rotation given for HEP 2-3' every 2 hours  Plan: Continue per plan of care  Insurance:  AMA/CMS Eval/ Re-eval POC expires Karen Bancroft #/ Referral # Total units  Start date  Expiration date Extension  Visit limitation? PT only or  PT+OT? [facility] 2022 12 weeks - 2022 52        35$ copay                                                                      Date 2022       Visit Number IE 2       Manual         [place] roll Performed B        CPA                           Neuro Re-Ed         Sympathetic downregulation         Sustained extension 2' P! Sustained flexion 2x2'        Lateral shift x10 ea R and L 2x10 R and L PTOP       Flexion rotation in supine  3x3' PT assist with postioning                                                    TherEx         Marching         Bridges         Squats                                             TherAct         Patient education         Functional lifting                                    Gait Training                                    Modalities         CP               Precautions: none  History reviewed  No pertinent past medical history

## 2022-09-26 ENCOUNTER — OFFICE VISIT (OUTPATIENT)
Dept: PHYSICAL THERAPY | Facility: CLINIC | Age: 82
End: 2022-09-26
Payer: COMMERCIAL

## 2022-09-26 ENCOUNTER — TELEPHONE (OUTPATIENT)
Dept: FAMILY MEDICINE CLINIC | Facility: CLINIC | Age: 82
End: 2022-09-26

## 2022-09-26 DIAGNOSIS — M54.16 LUMBAR RADICULOPATHY, ACUTE: Primary | ICD-10-CM

## 2022-09-26 PROCEDURE — 97530 THERAPEUTIC ACTIVITIES: CPT

## 2022-09-26 PROCEDURE — 97112 NEUROMUSCULAR REEDUCATION: CPT

## 2022-09-26 PROCEDURE — 97110 THERAPEUTIC EXERCISES: CPT

## 2022-09-26 NOTE — PROGRESS NOTES
Daily Note     Today's date: 2022  Patient name: Harsha Roth  : 1940  MRN: [id number]  Referring provider: Analy Green DO  Dx:   Encounter Diagnosis     ICD-10-CM    1  Lumbar radiculopathy, acute  M54 16                   Subjective: Patient reports pain into the foot on presentation  No change since last visit  Objective: See treatment diary below      Assessment: Tolerated treatment fair  Patient increased pain and peripheralization with standing lateral shift correction  No change in radiating sx with seated flexion rotation  Centralized and decreased with flexion rotation in supine  Gradually centralized with lateral glides in standing, but difficulty with maintaining form for adequate glide without extensive cuing  Pain maintained at the knee after sustained hips off center and repeated flexion rotation in supine  Educated to contact spine and pain due to intensity of pain limiting progressions in therapy, may require further pharmacologic management in order to maximize benefits of PT  Plan: Continue per plan of care  Insurance:  AMA/CMS Eval/ Re-eval POC expires Karen Socorro #/ Referral # Total units  Start date  Expiration date Extension  Visit limitation? PT only or  PT+OT? [facility] 2022 12 weeks - 2022 52        35$ copay                                                                      Date 2022      Visit Number IE 2 3      Manual         [place] roll Performed B        CPA                           Neuro Re-Ed         Sympathetic downregulation         Sustained extension 2' P!   Hips to L 4 5'       Sustained flexion 2x2'        Lateral shift x10 ea R and L 2x10 R and L PTOP 3x10 L TC      Flexion rotation in supine  3x3' PT assist with postioning 2x3' PT assist                                                   TherEx         Marching         Bridges         Squats         Seated flexion rotation to R x20                                 TherAct         Patient education   10'       Functional lifting                                    Gait Training                                    Modalities         CP               Precautions: none  History reviewed  No pertinent past medical history

## 2022-09-26 NOTE — TELEPHONE ENCOUNTER
We can have pt seen by sports medicine - sounds like it was an acute injury he had while he was moving

## 2022-09-27 ENCOUNTER — LAB (OUTPATIENT)
Dept: LAB | Facility: HOSPITAL | Age: 82
End: 2022-09-27
Payer: COMMERCIAL

## 2022-09-27 DIAGNOSIS — Z12.5 SCREENING FOR PROSTATE CANCER: ICD-10-CM

## 2022-09-27 DIAGNOSIS — Z11.59 NEED FOR HEPATITIS C SCREENING TEST: ICD-10-CM

## 2022-09-27 DIAGNOSIS — E78.2 MIXED HYPERLIPIDEMIA: ICD-10-CM

## 2022-09-27 DIAGNOSIS — F41.9 ANXIETY: ICD-10-CM

## 2022-09-27 DIAGNOSIS — Z13.6 SCREENING FOR CARDIOVASCULAR CONDITION: ICD-10-CM

## 2022-09-27 DIAGNOSIS — I10 ESSENTIAL HYPERTENSION: ICD-10-CM

## 2022-09-27 LAB
ALBUMIN SERPL BCP-MCNC: 3.4 G/DL (ref 3.5–5)
ALP SERPL-CCNC: 49 U/L (ref 46–116)
ALT SERPL W P-5'-P-CCNC: 32 U/L (ref 12–78)
ANION GAP SERPL CALCULATED.3IONS-SCNC: 7 MMOL/L (ref 4–13)
AST SERPL W P-5'-P-CCNC: 20 U/L (ref 5–45)
BILIRUB SERPL-MCNC: 0.83 MG/DL (ref 0.2–1)
BUN SERPL-MCNC: 19 MG/DL (ref 5–25)
CALCIUM ALBUM COR SERPL-MCNC: 9 MG/DL (ref 8.3–10.1)
CALCIUM SERPL-MCNC: 8.5 MG/DL (ref 8.3–10.1)
CHLORIDE SERPL-SCNC: 100 MMOL/L (ref 96–108)
CHOLEST SERPL-MCNC: 184 MG/DL
CO2 SERPL-SCNC: 31 MMOL/L (ref 21–32)
CREAT SERPL-MCNC: 1.08 MG/DL (ref 0.6–1.3)
ERYTHROCYTE [DISTWIDTH] IN BLOOD BY AUTOMATED COUNT: 14.7 % (ref 11.6–15.1)
GFR SERPL CREATININE-BSD FRML MDRD: 64 ML/MIN/1.73SQ M
GLUCOSE P FAST SERPL-MCNC: 83 MG/DL (ref 65–99)
HCT VFR BLD AUTO: 43.8 % (ref 36.5–49.3)
HCV AB SER QL: NORMAL
HDLC SERPL-MCNC: 84 MG/DL
HGB BLD-MCNC: 14.5 G/DL (ref 12–17)
LDLC SERPL CALC-MCNC: 67 MG/DL (ref 0–100)
MCH RBC QN AUTO: 32.7 PG (ref 26.8–34.3)
MCHC RBC AUTO-ENTMCNC: 33.1 G/DL (ref 31.4–37.4)
MCV RBC AUTO: 99 FL (ref 82–98)
PLATELET # BLD AUTO: 181 THOUSANDS/UL (ref 149–390)
PMV BLD AUTO: 9.4 FL (ref 8.9–12.7)
POTASSIUM SERPL-SCNC: 4.1 MMOL/L (ref 3.5–5.3)
PROT SERPL-MCNC: 6.4 G/DL (ref 6.4–8.4)
PSA SERPL-MCNC: 0.2 NG/ML (ref 0–4)
RBC # BLD AUTO: 4.44 MILLION/UL (ref 3.88–5.62)
SODIUM SERPL-SCNC: 138 MMOL/L (ref 135–147)
TRIGL SERPL-MCNC: 166 MG/DL
WBC # BLD AUTO: 9.94 THOUSAND/UL (ref 4.31–10.16)

## 2022-09-27 PROCEDURE — 85027 COMPLETE CBC AUTOMATED: CPT

## 2022-09-27 PROCEDURE — 86803 HEPATITIS C AB TEST: CPT

## 2022-09-27 PROCEDURE — 80053 COMPREHEN METABOLIC PANEL: CPT

## 2022-09-27 PROCEDURE — 36415 COLL VENOUS BLD VENIPUNCTURE: CPT

## 2022-09-27 PROCEDURE — G0103 PSA SCREENING: HCPCS

## 2022-09-27 PROCEDURE — 80061 LIPID PANEL: CPT

## 2022-09-28 ENCOUNTER — CONSULT (OUTPATIENT)
Dept: CARDIOLOGY CLINIC | Facility: CLINIC | Age: 82
End: 2022-09-28
Payer: COMMERCIAL

## 2022-09-28 VITALS
DIASTOLIC BLOOD PRESSURE: 80 MMHG | TEMPERATURE: 97 F | SYSTOLIC BLOOD PRESSURE: 120 MMHG | WEIGHT: 162 LBS | HEIGHT: 64 IN | BODY MASS INDEX: 27.66 KG/M2 | OXYGEN SATURATION: 98 % | HEART RATE: 82 BPM

## 2022-09-28 DIAGNOSIS — E78.2 MIXED HYPERLIPIDEMIA: ICD-10-CM

## 2022-09-28 DIAGNOSIS — I25.10 CORONARY ARTERY DISEASE INVOLVING NATIVE CORONARY ARTERY OF NATIVE HEART WITHOUT ANGINA PECTORIS: ICD-10-CM

## 2022-09-28 DIAGNOSIS — I45.2 BIFASCICULAR BLOCK: ICD-10-CM

## 2022-09-28 DIAGNOSIS — I10 ESSENTIAL HYPERTENSION: ICD-10-CM

## 2022-09-28 DIAGNOSIS — Z95.1 H/O CORONARY ARTERY BYPASS SURGERY: ICD-10-CM

## 2022-09-28 DIAGNOSIS — F41.9 ANXIETY: ICD-10-CM

## 2022-09-28 DIAGNOSIS — Z87.898 H/O BRAIN TUMOR: ICD-10-CM

## 2022-09-28 PROCEDURE — 99214 OFFICE O/P EST MOD 30 MIN: CPT | Performed by: INTERNAL MEDICINE

## 2022-09-28 PROCEDURE — 1160F RVW MEDS BY RX/DR IN RCRD: CPT | Performed by: INTERNAL MEDICINE

## 2022-09-28 PROCEDURE — 93000 ELECTROCARDIOGRAM COMPLETE: CPT | Performed by: INTERNAL MEDICINE

## 2022-09-28 PROCEDURE — 3079F DIAST BP 80-89 MM HG: CPT | Performed by: INTERNAL MEDICINE

## 2022-09-28 PROCEDURE — 3074F SYST BP LT 130 MM HG: CPT | Performed by: INTERNAL MEDICINE

## 2022-09-28 RX ORDER — NITROGLYCERIN 400 UG/1
1 SPRAY ORAL
Qty: 4.9 G | Refills: 1 | Status: SHIPPED | OUTPATIENT
Start: 2022-09-28

## 2022-09-28 NOTE — PROGRESS NOTES
Consultation - Cardiology Office  [facility]  Paul Lakhani 80 y o  male MRN: [id number]  : 1940  Unit/Bed#:  Encounter: [id number]      Assessment:     1  Coronary artery disease involving native coronary artery of native heart without angina pectoris    2  Essential hypertension    3  Mixed hyperlipidemia    4  Bifascicular block    5  H/O coronary artery bypass surgery    6  Anxiety    7  H/O brain tumor        Discussion summary and Plan:    1  Coronary artery disease status post coronary bypass surgery with 2 vessel bypass in  in a  [facility] [address] details not available will try to get records  He had a cardiac workup done in the form of stress test and echo Doppler in the last 1 year with cardiologist there  He will give us information and will try to get those records  2  Essential hypertension  Patient blood pressure is acceptable  Heart rate is slightly elevated  Maybe he is having back pain  Will continue to monitor  Electrolytes were acceptable labs from 2020 reviewed  3  Dyslipidemia with good statin he is on high intensity statin continue same medication  Triglycerides slightly elevated patient is well aware  4  Bifascicular block  Patient's EKG shows bifascicular block with LVH he also history of cardiac murmur will check echo Doppler at some point if her echo is not available  5  History of anxiety    6  History of brain tumor which was benign has recovered well  Continue same medication will try to get those records and follow-up in 4 months  Patient / Kareen Sawyer was advised and educated to call our office  immediately if  patient has any new symptoms of chest pain/shortness of breath, near-syncope, syncope, light headedness sustained palpitations or any other cardiovascular symptoms before their scheduled follow-up appointment  Office number was provided #[phone]  Thank you for your consultation    If you have any question please call me at [phone]    Counseling :  A description of the counseling  Goals and Barriers  Patient's ability to self care: Yes  Medication side effect reviewed with patient in detail and all their questions answered to their satisfaction  Primary Care Physician Requesting Consult: Klaudia Garland DO    Reason for Consult / Principal Problem: To establish with Cardiology practice as he has multiple risk factors  HPI :     Manuel Noonan is a 80y o  year old male who was referred by primary care doctor for for multiple cardiac risk factors  Patient has medical history significant for coronary artery disease status post 2 vessel bypass in 1989 in [facility], dyslipidemia, hypertension, bifascicular block, history of benign brain tumor who has moved to this area from [place] and came to see us  He used to follow with cardiologist there and has regular cardiac care  He believes he had a echo and stress test done within a year reports of which will be obtained from them  His EKG shows bifascicular block with LVH with repolarization abnormality we do not have old EKG to compare but patient was aware of it  He used to smoke he has quit smoking  He had a family history of heart disease  He moved to this area as his wife now having issues and he want to close his family  He has recently blood test done in September 2022 which has been acceptable  No nausea no vomiting no issues related to cardiac problem he is having issue with his back problem and is currently in pain  No other cardiovascular problem at this time  He smokes about pack a day for about 12 years he quit smoking wave before his surgery  No other recent surgery  He used to walk a lot  Recently not walking that much due to back problems  Patient additional records from his previous cardiologist reviewed    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, history of transitional CSL of bladder which resolved, communicate hydro saphenous, and history of mild aortic stenosis  Review of Systems   Constitutional: Negative for activity change, chills, diaphoresis, fever and unexpected weight change  HENT: Negative for congestion  Eyes: Negative for discharge and redness  Respiratory: Negative for cough, chest tightness, shortness of breath and wheezing  Cardiovascular: Negative  Negative for chest pain, palpitations and leg swelling  Gastrointestinal: Negative for abdominal pain, diarrhea and nausea  Endocrine: Negative  Genitourinary: Negative for decreased urine volume and urgency  Musculoskeletal: Positive for arthralgias and back pain  Negative for gait problem  Skin: Negative for rash and wound  Allergic/Immunologic: Negative  Neurological: Negative for dizziness, seizures, syncope, weakness, light-headedness and headaches  Hematological: Negative  Psychiatric/Behavioral: Negative for agitation and confusion  The patient is not nervous/anxious  Historical Information   History reviewed  No pertinent past medical history    Past Surgical History:   Procedure Laterality Date    BRAIN SURGERY      CORONARY ARTERY BYPASS GRAFT      URINARY SURGERY       Social History     Substance and Sexual Activity   Alcohol Use None     Social History     Substance and Sexual Activity   Drug Use Not on file     Social History     Tobacco Use   Smoking Status Former Smoker   Smokeless Tobacco Never Used     Family History:   Family History   Problem Relation Age of Onset    Alzheimer's disease Mother     Heart disease Father     Mental illness Neg Hx        Meds/Allergies     No Known Allergies    Current Outpatient Medications:     ASPIRIN 81 PO, Take by mouth, Disp: , Rfl:     atorvastatin (LIPITOR) 80 mg tablet, Take 1 tablet (80 mg total) by mouth daily, Disp: 90 tablet, Rfl: 1    LOSARTAN POTASSIUM PO, Take by mouth, Disp: , Rfl:     Multiple Vitamins-Minerals (PRESERVISION AREDS 2 PO), Take by mouth, Disp: , Rfl:     nitroglycerin (NITROLINGUAL) 0 4 mg/spray spray, Place 1 spray under the tongue every 5 (five) minutes as needed for chest pain, Disp: 4 9 g, Rfl: 1    sertraline (ZOLOFT) 50 mg tablet, Take 50 mg by mouth daily, Disp: , Rfl:     baclofen 10 mg tablet, Take 1 tablet (10 mg total) by mouth daily at bedtime (Patient not taking: Reported on 9/28/2022), Disp: 20 tablet, Rfl: 0    predniSONE 20 mg tablet, 4 tabs for three days, 3 tabs for three days, 2 tabs for three days, 1 tab for three days, 1/2 tab for 4 days (Patient not taking: Reported on 9/28/2022), Disp: 32 tablet, Rfl: 0    Vitals: Blood pressure 120/80, pulse 82, temperature (!) 97 °F (36 1 °C), height 5' 4" (1 626 m), weight 73 5 kg (162 lb), SpO2 98 %  ?  Body mass index is 27 81 kg/m²  Wt Readings from Last 3 Encounters:   09/28/22 73 5 kg (162 lb)   09/13/22 74 7 kg (164 lb 9 6 oz)   09/07/22 73 kg (161 lb)     Vitals:    09/28/22 1442   Weight: 73 5 kg (162 lb)     BP Readings from Last 3 Encounters:   09/28/22 120/80   09/13/22 136/74   09/07/22 150/90         Physical Exam    Neurologic:  Alert & oriented x 3, no new focal deficits, Not in any acute distress,  Constitutional:  Adequate built, non-toxic appearance   Neck: Normal range of motion, no tenderness,  Neck supple   Respiratory:  Bilateral air entry, mostly clear to auscultation  Cardiovascular: S1-S2 regular with a 2/6 ejection systolic murmur and S4 is present  GI:  Soft, nondistended,  nontender, no hepatosplenomegaly appreciated  Musculoskeletal:  No edema, no tenderness, no deformities     Skin:  Well hydrated, no rash   Extremities:  No edema and distal pulses are present  Psychiatric:  Speech and behavior appropriate     Diagnostic Studies Review Cardio:    Echo Doppler done with his cardiologist on January 2022 shows EF is 50-55 percent, mild aortic stenosis, left ventricle normal size with hypokinesis of inferior wall, mild mitral regurgitation, mild AI mild AS and aortic root was mildly dilated but ascending aorta was normal in size  He has a normal Lexiscan on 07/02/2019  EKG:    Twelve lead EKG 09/28/2022 shows normal sinus rhythm RBBB with left anterior fascicular block  LVH with repolarization abnormality  Patient has bifascicular block  Patient is aware of it    EKG from his cardiologist done shows no significant change few PACs were noted  Lab Review   Lab Results   Component Value Date    WBC 9 94 09/27/2022    HGB 14 5 09/27/2022    HCT 43 8 09/27/2022    MCV 99 (H) 09/27/2022    RDW 14 7 09/27/2022     09/27/2022     BMP:  Lab Results   Component Value Date    SODIUM 138 09/27/2022    K 4 1 09/27/2022     09/27/2022    CO2 31 09/27/2022    BUN 19 09/27/2022    CREATININE 1 08 09/27/2022    GLUF 83 09/27/2022    CALCIUM 8 5 09/27/2022    CORRECTEDCA 9 0 09/27/2022    EGFR 64 09/27/2022     Troponins:    LFT:  Lab Results   Component Value Date    AST 20 09/27/2022    ALT 32 09/27/2022    ALKPHOS 49 09/27/2022    TP 6 4 09/27/2022    ALB 3 4 (L) 09/27/2022      Lipid Profile:   Lab Results   Component Value Date    CHOLESTEROL 184 09/27/2022    HDL 84 09/27/2022    LDLCALC 67 09/27/2022    TRIG 166 (H) 09/27/2022     Lab Results   Component Value Date    CHOLESTEROL 184 09/27/2022         Dr Leopoldo Boas, MD [facility]      "This note was completed in part utilizing m-modal fluency direct voice recognition software  Grammatical errors, random word insertion, spelling mistakes, and incomplete sentences may be an occasional consequence of the system secondary to software limitations, ambient noise and hardware issues  Please read the chart carefully and recognize, using context, where substitutions have occurred    If you have any questions or concerns about the context, text or information contained within the body of this dictation, please contact myself, the provider, for further clarification "

## 2022-09-29 ENCOUNTER — OFFICE VISIT (OUTPATIENT)
Dept: PHYSICAL THERAPY | Facility: CLINIC | Age: 82
End: 2022-09-29
Payer: COMMERCIAL

## 2022-09-29 DIAGNOSIS — M54.16 LUMBAR RADICULOPATHY, ACUTE: Primary | ICD-10-CM

## 2022-09-29 PROCEDURE — 97110 THERAPEUTIC EXERCISES: CPT

## 2022-09-29 PROCEDURE — 97112 NEUROMUSCULAR REEDUCATION: CPT

## 2022-09-29 NOTE — PROGRESS NOTES
Daily Note     Today's date: 2022  Patient name: Aster Pacheco  : 1940  MRN: [id number]  Referring provider: Edgard Gonzales DO  Dx:   Encounter Diagnosis     ICD-10-CM    1  Lumbar radiculopathy, acute  M54 16                   Subjective: Patient reports pain into both buttock today, not down into the thighs on presentation  Seeing ortho on Monday, can't get into pain until November  Objective: See treatment diary below      Assessment: Tolerated treatment fair  Patient would benefit from continued PT to return to PLOF  Patient tolerated more movement with less peripheralization compared to previous sessions  Centralized and decreased with flexion rotation in supine  Limited tolerance to extension assessment with peripheralization with CPA, maintained with SEIL and REIL  10/3/2022: Patient has been placed on hold by physician  Re-evaluate when he returns  Plan: Continue per plan of care  Insurance:  AMA/CMS Eval/ Re-eval POC expires Kalie Dey #/ Referral # Total units  Start date  Expiration date Extension  Visit limitation? PT only or  PT+OT? [facility] 2022 12 weeks - 2022 52        35$ copay                                                                      Date 2022     Visit Number IE 2 3 4     Manual         [place] roll Performed B                 CPA lumbar    Gr3-4 performed               Neuro Re-Ed         Sympathetic downregulation         Sustained extension 2' P! Hips to L 4 5'  HOB elv 3'  Flat 3'     Sustained flexion 2x2'   DKTC x5     Lateral shift x10 ea R and L 2x10 R and L PTOP 3x10 L TC 2x10 L TC  x10 R P! Flexion rotation in supine  3x3' PT assist with postioning 2x3' PT assist 3x3' PT assist     PPU    2x10 At wall                                        TherEx         Marching         Bridges    X10, P!      Squats         Seated flexion rotation to R   x20      LTR to R    x10 TherAct         Patient education   8'  8'     Functional lifting                                    Gait Training                                    Modalities         CP               Precautions: none  History reviewed  No pertinent past medical history

## 2022-09-30 ENCOUNTER — TELEPHONE (OUTPATIENT)
Dept: OBGYN CLINIC | Facility: CLINIC | Age: 82
End: 2022-09-30

## 2022-09-30 NOTE — TELEPHONE ENCOUNTER
He will be going to [place] tomorrow for his xray and knows to come in for his appointment on Monday with Dr Wong Police

## 2022-10-03 ENCOUNTER — APPOINTMENT (OUTPATIENT)
Dept: RADIOLOGY | Facility: CLINIC | Age: 82
End: 2022-10-03
Payer: COMMERCIAL

## 2022-10-03 ENCOUNTER — OFFICE VISIT (OUTPATIENT)
Dept: OBGYN CLINIC | Facility: CLINIC | Age: 82
End: 2022-10-03
Payer: COMMERCIAL

## 2022-10-03 VITALS
BODY MASS INDEX: 27.49 KG/M2 | WEIGHT: 161 LBS | SYSTOLIC BLOOD PRESSURE: 175 MMHG | HEART RATE: 80 BPM | HEIGHT: 64 IN | DIASTOLIC BLOOD PRESSURE: 75 MMHG

## 2022-10-03 DIAGNOSIS — M54.16 LUMBAR RADICULOPATHY, ACUTE: ICD-10-CM

## 2022-10-03 DIAGNOSIS — M54.16 LUMBAR RADICULOPATHY, ACUTE: Primary | ICD-10-CM

## 2022-10-03 DIAGNOSIS — M41.9 SCOLIOSIS OF LUMBAR SPINE, UNSPECIFIED SCOLIOSIS TYPE: ICD-10-CM

## 2022-10-03 PROCEDURE — 72100 X-RAY EXAM L-S SPINE 2/3 VWS: CPT

## 2022-10-03 PROCEDURE — 99213 OFFICE O/P EST LOW 20 MIN: CPT | Performed by: ORTHOPAEDIC SURGERY

## 2022-10-03 NOTE — PROGRESS NOTES
Assessment/Plan:  1  Lumbar radiculopathy, acute  Ambulatory Referral to Orthopedic Surgery    XR spine lumbar 2 or 3 views injury    MRI lumbar spine wo contrast    Ambulatory referral to Pain Management   2  Scoliosis of lumbar spine, unspecified scoliosis type  MRI lumbar spine wo contrast    Ambulatory referral to Pain Management     Gonzella Seip has low back pain and occasional symptoms of left lumbar radiculopathy  His x-rays today demonstrate significant scoliosis and degenerative changes  Since therapy is hurting him and he has failed other conservative measures and I do think an MRI of the lumbar spine and evaluation with pain management is the best treatment option at this time  He verbalized understanding this plan  Subjective:   Nora Mondragon is a 80 y o  male who presents to the office for evaluation for ongoing low back pain  He has been feeling discomfort in his back and symptoms radiating into his left leg for the past 6 weeks  He saw his PCP and started physical therapy and states that physical therapy seems to have made his pain worse  He has undergone treatment with oral steroids and states that this did not help his pain whatsoever  He continues to have aching throbbing pain in his low back that worsens with movement  Review of Systems   Constitutional: Negative for chills, fever and unexpected weight change  HENT: Negative for hearing loss, nosebleeds and sore throat  Eyes: Negative for pain, redness and visual disturbance  Respiratory: Negative for cough, shortness of breath and wheezing  Cardiovascular: Negative for chest pain, palpitations and leg swelling  Gastrointestinal: Negative for abdominal pain, nausea and vomiting  Endocrine: Negative for polyphagia and polyuria  Genitourinary: Negative for dysuria and hematuria  Musculoskeletal:        See HPI   Skin: Negative for rash and wound  Neurological: Negative for dizziness, numbness and headaches  Psychiatric/Behavioral: Negative for decreased concentration and suicidal ideas  The patient is not nervous/anxious  No past medical history on file  Past Surgical History:   Procedure Laterality Date    BRAIN SURGERY      CORONARY ARTERY BYPASS GRAFT      URINARY SURGERY         Family History   Problem Relation Age of Onset    Alzheimer's disease Mother     Heart disease Father     Mental illness Neg Hx        Social History     Occupational History    Not on file   Tobacco Use    Smoking status: Former Smoker    Smokeless tobacco: Never Used   Substance and Sexual Activity    Alcohol use: Not on file    Drug use: Not on file    Sexual activity: Not on file         Current Outpatient Medications:     ASPIRIN 81 PO, Take by mouth, Disp: , Rfl:     atorvastatin (LIPITOR) 80 mg tablet, Take 1 tablet (80 mg total) by mouth daily, Disp: 90 tablet, Rfl: 1    baclofen 10 mg tablet, Take 1 tablet (10 mg total) by mouth daily at bedtime (Patient not taking: Reported on 9/28/2022), Disp: 20 tablet, Rfl: 0    LOSARTAN POTASSIUM PO, Take by mouth, Disp: , Rfl:     Multiple Vitamins-Minerals (PRESERVISION AREDS 2 PO), Take by mouth, Disp: , Rfl:     nitroglycerin (NITROLINGUAL) 0 4 mg/spray spray, Place 1 spray under the tongue every 5 (five) minutes as needed for chest pain, Disp: 4 9 g, Rfl: 1    predniSONE 20 mg tablet, 4 tabs for three days, 3 tabs for three days, 2 tabs for three days, 1 tab for three days, 1/2 tab for 4 days (Patient not taking: Reported on 9/28/2022), Disp: 32 tablet, Rfl: 0    sertraline (ZOLOFT) 50 mg tablet, Take 50 mg by mouth daily, Disp: , Rfl:     No Known Allergies    Objective:  Vitals:    10/03/22 0953   BP: (!) 175/75   Pulse: 80       Back Exam     Tenderness   The patient is experiencing tenderness in the lumbar      Range of Motion   Extension: abnormal   Flexion: normal     Muscle Strength   Right Quadriceps:  5/5   Left Quadriceps:  5/5   Right Hamstrings:  5/5   Left Hamstrings:  5/5     Tests   Straight leg raise right: negative  Straight leg raise left: positive at 90 deg    Other   Sensation: normal  Gait: normal             Physical Exam  Vitals and nursing note reviewed  Constitutional:       Appearance: He is well-developed  HENT:      Head: Normocephalic and atraumatic  Eyes:      General: No scleral icterus  Extraocular Movements: Extraocular movements intact  Conjunctiva/sclera: Conjunctivae normal    Cardiovascular:      Rate and Rhythm: Normal rate  Pulmonary:      Effort: Pulmonary effort is normal  No respiratory distress  Musculoskeletal:      Cervical back: Normal range of motion and neck supple  Lumbar back: Positive left straight leg raise test  Negative right straight leg raise test       Comments: As noted in HPI   Skin:     General: Skin is warm and dry  Neurological:      Mental Status: He is alert and oriented to person, place, and time  Psychiatric:         Behavior: Behavior normal          I have personally reviewed pertinent films in PACS and my interpretation is as follows:  X-rays of the lumbar spine demonstrate diffuse degenerative changes and scoliosis with convexity to the right  This document was created using speech voice recognition software  Grammatical errors, random word insertions, pronoun errors, and incomplete sentences are an occasional consequence of this system due to software limitations, ambient noise, and hardware issues  Any formal questions or concerns about content, text, or information contained within the body of this dictation should be directly addressed to the provider for clarification

## 2022-10-10 ENCOUNTER — TELEPHONE (OUTPATIENT)
Dept: OBGYN CLINIC | Facility: HOSPITAL | Age: 82
End: 2022-10-10

## 2022-10-10 NOTE — TELEPHONE ENCOUNTER
Caller: Chapo Bhatt    Doctor: Yayo Elena    Reason for call: Patient called stating that he saw Dr Yayo Elena on Friday and was advised to get MRI and referred to pain management following MRI  Patient unable to get MRI scheduled until 10/25/22 and he was wondering if there is anyway he can get an MRI sooner because he is in so much pain and if anything can be prescribed to help alleviate the pain?   Caller asked to speak to clinical team      Call back#: [phone]

## 2022-10-10 NOTE — TELEPHONE ENCOUNTER
LVM advising the hospital system does not have anything sooner , advised he could try an outside imaging place called [facility] located in [facility]  Left cb # for patient to cb and discuss

## 2022-10-11 DIAGNOSIS — M54.16 LUMBAR RADICULOPATHY, ACUTE: Primary | ICD-10-CM

## 2022-10-11 NOTE — TELEPHONE ENCOUNTER
Caller: Caridad Paige    Doctor: Kevin De Leon    Reason for call: Patient is asking if anything can be prescribed to alleviate his pain due to his MRI appointment being so far out  Please advise       Pharmacy: [facility] in [place]    Call back#: [phone]

## 2022-10-12 NOTE — TELEPHONE ENCOUNTER
There are not a lot of good medication options for him  Steroids did not work for him  An anti-inflammatory medications would not be safe to mix with some of his other medications  We also do not prescribe other pain medications like opiates for issues other than surgery or fractures  I would stick with Tylenol over-the-counter    He can take an occasional Motrin or Aleve if needed

## 2022-10-14 ENCOUNTER — HOSPITAL ENCOUNTER (OUTPATIENT)
Dept: RADIOLOGY | Facility: HOSPITAL | Age: 82
Discharge: HOME | End: 2022-10-14
Attending: FAMILY MEDICINE
Payer: COMMERCIAL

## 2022-10-14 DIAGNOSIS — Z85.51 HISTORY OF BLADDER CANCER: ICD-10-CM

## 2022-10-14 PROCEDURE — 76857 US EXAM PELVIC LIMITED: CPT

## 2022-10-25 ENCOUNTER — HOSPITAL ENCOUNTER (OUTPATIENT)
Dept: RADIOLOGY | Facility: HOSPITAL | Age: 82
Discharge: HOME | End: 2022-10-25
Attending: ORTHOPAEDIC SURGERY
Payer: COMMERCIAL

## 2022-10-25 DIAGNOSIS — M41.9 SCOLIOSIS OF LUMBAR SPINE, UNSPECIFIED SCOLIOSIS TYPE: ICD-10-CM

## 2022-10-25 DIAGNOSIS — M54.16 LUMBAR RADICULOPATHY, ACUTE: ICD-10-CM

## 2022-10-25 PROCEDURE — 72148 MRI LUMBAR SPINE W/O DYE: CPT

## 2022-10-25 PROCEDURE — G1004 CDSM NDSC: HCPCS

## 2022-10-28 ENCOUNTER — TELEPHONE (OUTPATIENT)
Dept: OBGYN CLINIC | Facility: CLINIC | Age: 82
End: 2022-10-28

## 2022-10-28 NOTE — TELEPHONE ENCOUNTER
[facility] for patient to cb     ----- Message from Thea Sal DO sent at 10/28/2022  4:15 PM EDT -----   Jose Antonio Magana had an MRI of his lumbar spine  It showed severe spinal stenosis which is pressing on 1 of the nerves on the left side of his lower back  He has an upcoming appointment with pain management on 11/01 and they can discuss treatments for him

## 2022-10-31 NOTE — TELEPHONE ENCOUNTER
Caller: Irene Restrepo    Doctor: Sera Argueta    Reason for call: returning a call from the office    Transferred to clinical at [facility]    Call back#: [phone]

## 2022-10-31 NOTE — TELEPHONE ENCOUNTER
Sw with patient and advised of results   He gave verbal understanding and looks forward to seeing Dr Rowan Garcia on 11/01

## 2022-11-01 ENCOUNTER — CONSULT (OUTPATIENT)
Dept: PAIN MEDICINE | Facility: CLINIC | Age: 82
End: 2022-11-01

## 2022-11-01 ENCOUNTER — TELEPHONE (OUTPATIENT)
Dept: PAIN MEDICINE | Facility: CLINIC | Age: 82
End: 2022-11-01

## 2022-11-01 VITALS
WEIGHT: 165 LBS | HEART RATE: 78 BPM | BODY MASS INDEX: 28.17 KG/M2 | SYSTOLIC BLOOD PRESSURE: 111 MMHG | TEMPERATURE: 98.2 F | HEIGHT: 64 IN | RESPIRATION RATE: 19 BRPM | DIASTOLIC BLOOD PRESSURE: 65 MMHG

## 2022-11-01 DIAGNOSIS — M54.16 LUMBAR RADICULOPATHY, ACUTE: ICD-10-CM

## 2022-11-01 DIAGNOSIS — M41.9 SCOLIOSIS OF LUMBAR SPINE, UNSPECIFIED SCOLIOSIS TYPE: ICD-10-CM

## 2022-11-01 NOTE — TELEPHONE ENCOUNTER
Scheduled patient for LESI on 11/9/22  Patient denies RX blood thinners, is taking ASA 81mg  Nothing to eat or drink 1 hour prior to procedure  Needs to arrange transportation  Proper clothing for procedure  No vaccines 2 weeks prior or after procedure  If ill or place on antibiotics, please call to reschedule

## 2022-11-01 NOTE — PROGRESS NOTES
Assessment:  1  Lumbar radiculopathy, acute    2  Scoliosis of lumbar spine, unspecified scoliosis type        Plan:  Mr Lennie Fields is a very pleasant 72-year-old male who presents for initial evaluation regarding chronic low back pain with radiating symptoms into bilateral lower extremities of several years duration  He has been referred by Dr Jesus Duque regarding lumbar spinal stenosis with neurogenic claudication and is currently demonstrating both clinical and diagnostic evidence of such with MRI evidence of multilevel degenerative disc disease and varying degrees of moderate to severe central canal stenosis most notable at L4-L5  Not demonstrate any significant neurologic deficit, weakness, bowel or bladder incontinence or saddle anesthesia  At this time interventional approaches may be beneficial and warranted to manage his ongoing radicular symptoms into bilateral lower extremities  As such we will   1  Plan for lumbar epidural steroid injection under fluoro guidance L5-S1   2  Complete risks and benefits including bleeding, infection, tissue reaction, nerve injury and allergic reaction were discussed  The approach was demonstrated using models and literature was provided  Verbal and written consent was obtained  Extensive conversation regarding red flags including weakness, bowel or bladder incontinence, saddle anesthesia or worsening severity of his pain were discussed  If he were to develop any of the symptoms advised to go to ER or urgent care for further workup  For now we will try to manage his pain with interventional approaches    History of Present Illness:    Shukri Ayers is a 80 y o  male who presents to [facility] for initial evaluation of the above stated pain complaints  The patient has a past medical and chronic pain history as outlined in the assessment section   He was referred by Mainor Saenz DO  [address] [facility] [place]   Melvin Shultz 6   Today patient reports 2 months duration of low back pain with radiating symptoms into bilateral lower extremities  Reports symptoms got progressively worse after moving  Today reports moderate to severe pain rated 8/10 and interfering with daily activities  Today reports moderate to severe pain rated 8/10 and interfering with daily activities  Pain is intermittent 30-60% of the time that is worse in the evening  Describes symptoms as pins and needles, sharp, throbbing pain  Also reports upper extremity weakness but denies dropping objects  Denies falls  Does not use any durable medical equipment for ambulation  Symptoms are worse with lying down, standing, bending, sitting, walking, exercise, coughing, sneezing  Has had moderate relief with heat and no significant improvements with home exercises or rest   Denies smoking or marijuana use and admits to 1 beer per day  Currently taking Percocet and Valium as well as oral steroids which has provided minimal relief in his pain  Presents today for initial evaluation  Review of Systems:    Review of Systems   Constitutional: Negative for chills and fatigue  HENT: Negative for ear pain, mouth sores and sinus pressure  Eyes: Negative for pain, redness and visual disturbance  Respiratory: Negative for shortness of breath and wheezing  Cardiovascular: Negative for chest pain and palpitations  Gastrointestinal: Negative for abdominal pain and nausea  Endocrine: Negative for polyphagia  Musculoskeletal: Positive for back pain and gait problem  Negative for arthralgias and neck pain  Joint pain, back and a torn rotator cuff right side, decreased ROM  Pain in lower legs   Skin: Negative for wound  Neurological: Positive for weakness  Negative for seizures  Psychiatric/Behavioral: Positive for sleep disturbance  Negative for dysphoric mood           Patient Active Problem List   Diagnosis   • Anxiety   • Essential hypertension   • Mixed hyperlipidemia   • H/O brain tumor   • H/O coronary artery bypass surgery   • Coronary artery disease involving native coronary artery of native heart without angina pectoris   • Lumbar radiculopathy, acute       No past medical history on file      Past Surgical History:   Procedure Laterality Date   • BRAIN SURGERY     • CORONARY ARTERY BYPASS GRAFT     • URINARY SURGERY         Family History   Problem Relation Age of Onset   • Alzheimer's disease Mother    • Heart disease Father    • Mental illness Neg Hx        Social History     Occupational History   • Not on file   Tobacco Use   • Smoking status: Former Smoker   • Smokeless tobacco: Never Used   Substance and Sexual Activity   • Alcohol use: Not on file   • Drug use: Not on file   • Sexual activity: Not on file         Current Outpatient Medications:   •  ASPIRIN 81 PO, Take by mouth, Disp: , Rfl:   •  atorvastatin (LIPITOR) 80 mg tablet, Take 1 tablet (80 mg total) by mouth daily, Disp: 90 tablet, Rfl: 1  •  LOSARTAN POTASSIUM PO, Take by mouth, Disp: , Rfl:   •  Multiple Vitamins-Minerals (PRESERVISION AREDS 2 PO), Take by mouth, Disp: , Rfl:   •  nitroglycerin (NITROLINGUAL) 0 4 mg/spray spray, Place 1 spray under the tongue every 5 (five) minutes as needed for chest pain, Disp: 4 9 g, Rfl: 1  •  sertraline (ZOLOFT) 50 mg tablet, Take 50 mg by mouth daily, Disp: , Rfl:   •  baclofen 10 mg tablet, Take 1 tablet (10 mg total) by mouth daily at bedtime (Patient not taking: Reported on 9/28/2022), Disp: 20 tablet, Rfl: 0  •  predniSONE 20 mg tablet, 4 tabs for three days, 3 tabs for three days, 2 tabs for three days, 1 tab for three days, 1/2 tab for 4 days (Patient not taking: Reported on 9/28/2022), Disp: 32 tablet, Rfl: 0    No Known Allergies    Physical Exam:    /65   Pulse 78   Temp 98 2 °F (36 8 °C)   Resp 19   Ht 5' 4" (1 626 m)   Wt 74 8 kg (165 lb)   BMI 28 32 kg/m²     Constitutional: normal, well developed, well nourished, alert, in no distress and non-toxic and no overt pain behavior  Eyes: anicteric  HEENT: grossly intact  Neck: supple, symmetric, trachea midline and no masses   Pulmonary:even and unlabored  Cardiovascular:No edema or pitting edema present  Skin:Normal without rashes or lesions and well hydrated  Psychiatric:Mood and affect appropriate  Neurologic:Cranial Nerves II-XII grossly intact  Musculoskeletal:antalgic, forward flexed posture, tenderness to palpation bilateral lumbar paraspinals, decreased active and passive range of motion lumbar flexion and extension limited by pain, MMT 5/5 bilateral lower extremities, sensation grossly intact to light touch, DTRs within normal limits, positive straight leg raise in the seated position radicular pain into the right leg    Imaging      MRI lumbar spine wo contrast    Status: Final result               PACS Images     Show images for MRI lumbar spine wo contrast      MRI lumbar spine wo contrast: Result Notes     Jamison Hernandez DO   10/28/2022  4:15 PM EDT         Delmar Hartley had an MRI of his lumbar spine   It showed severe spinal stenosis which is pressing on 1 of the nerves on the left side of his lower back  [facility] has an upcoming appointment with pain management on 11/01 and they can discuss treatments for him               Study Result    Narrative & Impression   MRI LUMBAR SPINE WITHOUT CONTRAST     INDICATION: M54 16: Radiculopathy, lumbar region  M41 9: Scoliosis, unspecified      COMPARISON:  None      TECHNIQUE:  Sagittal T1, sagittal T2, sagittal inversion recovery, axial T1 and axial T2, coronal T2     IMAGE QUALITY:  Diagnostic     FINDINGS:     VERTEBRAL BODIES:  There are 5 lumbar type vertebral bodies  Focal dextroscoliosis of the lumbar spine centered at L3 with mild right lateral subluxation of L3 in relation to L2 and L4  Straightening of the normal lumbar lordosis    Type II fatty   endplate marrow degenerative change at the L2-3 level      SACRUM:  Normal signal within the sacrum  No evidence of insufficiency or stress fracture      DISTAL CORD AND CONUS:  Normal size and signal within the distal cord and conus      PARASPINAL SOFT TISSUES:  Paraspinal soft tissues are unremarkable      LOWER THORACIC DISC SPACES:  Mild lower thoracic degenerative change at T10-11 and T11-12 on the right primarily involving facet degenerative change with mild to moderate foraminal narrowing  No cord compression  T12-L1 level is normal      LUMBAR DISC SPACES:     L1-L2:  Slight disc desiccation  Small right foraminal disc protrusion with mild right foraminal narrowing  No cauda equina or foraminal nerve impingement      L2-L3:  Disc desiccation and loss of disc height  Annular bulging with endplate hypertrophic degenerative change  Moderate canal stenosis with particular distortion of the left anterior lateral aspect of the thecal sac  There is moderate left   foraminal narrowing and mild right foraminal narrowing      L3-L4:  Disc desiccation and loss of disc height  Annular bulging with a small broad-based left paramedian disc herniation extending through the neural foramen  Mild left greater than right facet hypertrophic degenerative change  Moderate primarily   left-sided canal stenosis with distortion of the anterior lateral aspect of the thecal sac and moderate left foraminal narrowing with compression of the exiting nerve      L4-L5:  Loss of disc height with annular bulging and a small broad-based central disc protrusion  Mild facet arthropathy with mild to moderate ligamentum flavum thickening  There is severe tricompartmental canal stenosis, see series 6 image 18  Mild   bilateral foraminal narrowing      L5-S1:  Disc desiccation and loss of disc height  Mild annular bulging with mild endplate and facet arthropathy  No disc herniation or canal stenosis    Mild distal right foraminal narrowing      IMPRESSION:     Scoliosis of the lumbar spine with lumbar degenerative disc disease most prominent at the L2-3 and L3-4 levels on the left where there is moderate canal stenosis and left foraminal narrowing  Compression and displacement of the left L3 nerve is seen  Correlate for corresponding radiculopathy      Severe canal stenosis at the L4-5 level secondary to annular bulging, central disc herniation and posterior element hypertrophic change, see series 6 image 18      Spine surgery consultation recommended      Workstation performed: [id number]        Imaging    MRI lumbar spine wo contrast (Order: [id number]) - 10/25/2022    Result History    MRI lumbar spine wo contrast (Order #[id number]) on 10/28/2022 - Order Result History Report    Order Report     Order Details      Order Questions    Question Answer   What is the patient's sedation requirement? No Sedation   Metallic implants? No   Note: Answer Yes or No   Does this procedure require the 3T MRI at [facility]? No   Release to patient through [facility] Immediate   Is order priority selected as STAT? No   Exam reason low back pain   Note: Enter reason for exam   Test performed Elective- non urgent              Result Information    Status Priority Source   Final result (10/28/2022  2:10 PM) Routine        MRI lumbar spine wo contrast: Result Notes     Thea Sal DO   10/28/2022  4:15 PM EDT          Paul had an MRI of his lumbar spine   It showed severe spinal stenosis which is pressing on 1 of the nerves on the left side of his lower back  [facility] has an upcoming appointment with pain management on 11/01 and they can discuss treatments for him             Reason for Exam    low back pain; Low back pain, symptoms persist with > 6wks conservative treatment; low back pain   Dx: Lumbar radiculopathy, acute [M54 16 (ICD-10-CM)];  Scoliosis of lumbar spine, unspecified scoliosis type [M41 9 (ICD-10-CM)]     All Reviewers List    Kiko Gaming on 10/28/2022  4:21 PM   Thea Sal DO on 10/28/2022  4:15 PM         MRI lumbar spine wo contrast: Patient Communication     Add Comments   Add Notifications          Signed by    Signed Date/Time  Phone Pager   Diana Ocampo 10/28/2022 14:10 [phone]        Exam Information    Status Exam Begun  Exam Ended  Performing Tech   Final [99] 10/25/2022 13:08 10/25/2022 13:28 Attala Mace       Screening Form Questions     important suggestion  Questionnaires are displayed in the order they were answered  Answer Comment   Has patient changed into the appropriate hospital gown? Yes    What are your symptoms? low back pain    Do you have a cardiac pacemaker or internal defibrillator? NA    Please list /make/model:     Have you had any HEART surgery? Other    Please list make/model:     Heart surgery: If "other", please describe: Coronary artery bypass graft    Have you had any BRAIN surgery? Craniotomy    Please list  or describe implant:      Brain surgery: If "other", please describe:     Have you had any EYE surgery? None    Eye surgery: If "other", please describe:     Have you ever injured your eyes with metal or metal fragments (grinding,metallic slivers)? No    Eye injury: Please describe:     Have you had any EAR surgery? None    Ear surgery: If "other", please describe:      Do you have an electronic "stimulation" device? None    Please provide  information:     Have you had any abdominal or pelvic surgery? No    Have you had any of the following abdominal or pelvic surgeries:     Abdominal/pelvic surgery: If "other", please describe:     Do you have any ORTHOPEDIC implants/devices? None    Do you have the following: None    Do you have liver disease? None    Do you have kidney disease? None    Have you had a problem with a previous MRI? No    Does the patient require pre-medication? Do you have a personal history of cancer?  None    If "other", please specify:       Are you wearing medication patches?   No    Are you wearing any of the following: None    Did the patient provide this information? Yes    Who provided this information (if not the patient)? Relationship to the patient:     Contact number:     MRI STAFF ONLY- Prior imaging reviewed in PACS (initials): kashif    MRI STAFF ONLY- Epic chart reviewed (initials): kashif    MRI STAFF ONLY: The patient was scheduled to receive MRI contrast and has received the Medication Guide  No contrast given        External Results Report    Open External Results Report      Encounter    View Encounter                     Patient Care Timeline    No data selected in time range    Pre-op Summary    Pre-op             Recovery Summary    Recovery                 Routing History    None         No orders to display       No orders of the defined types were placed in this encounter

## 2022-11-01 NOTE — H&P (VIEW-ONLY)
Assessment:  1  Lumbar radiculopathy, acute    2  Scoliosis of lumbar spine, unspecified scoliosis type        Plan:  Mr Madonna Shannon is a very pleasant 68-year-old male who presents for initial evaluation regarding chronic low back pain with radiating symptoms into bilateral lower extremities of several years duration  He has been referred by Dr Cordell Lopez regarding lumbar spinal stenosis with neurogenic claudication and is currently demonstrating both clinical and diagnostic evidence of such with MRI evidence of multilevel degenerative disc disease and varying degrees of moderate to severe central canal stenosis most notable at L4-L5  Not demonstrate any significant neurologic deficit, weakness, bowel or bladder incontinence or saddle anesthesia  At this time interventional approaches may be beneficial and warranted to manage his ongoing radicular symptoms into bilateral lower extremities  As such we will   1  Plan for lumbar epidural steroid injection under fluoro guidance L5-S1   2  Complete risks and benefits including bleeding, infection, tissue reaction, nerve injury and allergic reaction were discussed  The approach was demonstrated using models and literature was provided  Verbal and written consent was obtained  Extensive conversation regarding red flags including weakness, bowel or bladder incontinence, saddle anesthesia or worsening severity of his pain were discussed  If he were to develop any of the symptoms advised to go to ER or urgent care for further workup  For now we will try to manage his pain with interventional approaches    History of Present Illness:    Ondina Nava is a 80 y o  male who presents to [facility] for initial evaluation of the above stated pain complaints  The patient has a past medical and chronic pain history as outlined in the assessment section   He was referred by Kelly Vaca DO  [address] [address] [facility]   Today patient reports 2 months duration of low back pain with radiating symptoms into bilateral lower extremities  Reports symptoms got progressively worse after moving  Today reports moderate to severe pain rated 8/10 and interfering with daily activities  Today reports moderate to severe pain rated 8/10 and interfering with daily activities  Pain is intermittent 30-60% of the time that is worse in the evening  Describes symptoms as pins and needles, sharp, throbbing pain  Also reports upper extremity weakness but denies dropping objects  Denies falls  Does not use any durable medical equipment for ambulation  Symptoms are worse with lying down, standing, bending, sitting, walking, exercise, coughing, sneezing  Has had moderate relief with heat and no significant improvements with home exercises or rest   Denies smoking or marijuana use and admits to 1 beer per day  Currently taking Percocet and Valium as well as oral steroids which has provided minimal relief in his pain  Presents today for initial evaluation  Review of Systems:    Review of Systems   Constitutional: Negative for chills and fatigue  HENT: Negative for ear pain, mouth sores and sinus pressure  Eyes: Negative for pain, redness and visual disturbance  Respiratory: Negative for shortness of breath and wheezing  Cardiovascular: Negative for chest pain and palpitations  Gastrointestinal: Negative for abdominal pain and nausea  Endocrine: Negative for polyphagia  Musculoskeletal: Positive for back pain and gait problem  Negative for arthralgias and neck pain  Joint pain, back and a torn rotator cuff right side, decreased ROM  Pain in lower legs   Skin: Negative for wound  Neurological: Positive for weakness  Negative for seizures  Psychiatric/Behavioral: Positive for sleep disturbance  Negative for dysphoric mood           Patient Active Problem List   Diagnosis   • Anxiety   • Essential hypertension   • Mixed hyperlipidemia   • H/O brain tumor   • H/O coronary artery bypass surgery   • Coronary artery disease involving native coronary artery of native heart without angina pectoris   • Lumbar radiculopathy, acute       No past medical history on file      Past Surgical History:   Procedure Laterality Date   • BRAIN SURGERY     • CORONARY ARTERY BYPASS GRAFT     • URINARY SURGERY         Family History   Problem Relation Age of Onset   • Alzheimer's disease Mother    • Heart disease Father    • Mental illness Neg Hx        Social History     Occupational History   • Not on file   Tobacco Use   • Smoking status: Former Smoker   • Smokeless tobacco: Never Used   Substance and Sexual Activity   • Alcohol use: Not on file   • Drug use: Not on file   • Sexual activity: Not on file         Current Outpatient Medications:   •  ASPIRIN 81 PO, Take by mouth, Disp: , Rfl:   •  atorvastatin (LIPITOR) 80 mg tablet, Take 1 tablet (80 mg total) by mouth daily, Disp: 90 tablet, Rfl: 1  •  LOSARTAN POTASSIUM PO, Take by mouth, Disp: , Rfl:   •  Multiple Vitamins-Minerals (PRESERVISION AREDS 2 PO), Take by mouth, Disp: , Rfl:   •  nitroglycerin (NITROLINGUAL) 0 4 mg/spray spray, Place 1 spray under the tongue every 5 (five) minutes as needed for chest pain, Disp: 4 9 g, Rfl: 1  •  sertraline (ZOLOFT) 50 mg tablet, Take 50 mg by mouth daily, Disp: , Rfl:   •  baclofen 10 mg tablet, Take 1 tablet (10 mg total) by mouth daily at bedtime (Patient not taking: Reported on 9/28/2022), Disp: 20 tablet, Rfl: 0  •  predniSONE 20 mg tablet, 4 tabs for three days, 3 tabs for three days, 2 tabs for three days, 1 tab for three days, 1/2 tab for 4 days (Patient not taking: Reported on 9/28/2022), Disp: 32 tablet, Rfl: 0    No Known Allergies    Physical Exam:    /65   Pulse 78   Temp 98 2 °F (36 8 °C)   Resp 19   Ht 5' 4" (1 626 m)   Wt 74 8 kg (165 lb)   BMI 28 32 kg/m²     Constitutional: normal, well developed, well nourished, alert, in no distress and non-toxic and no overt pain behavior  Eyes: anicteric  HEENT: grossly intact  Neck: supple, symmetric, trachea midline and no masses   Pulmonary:even and unlabored  Cardiovascular:No edema or pitting edema present  Skin:Normal without rashes or lesions and well hydrated  Psychiatric:Mood and affect appropriate  Neurologic:Cranial Nerves II-XII grossly intact  Musculoskeletal:antalgic, forward flexed posture, tenderness to palpation bilateral lumbar paraspinals, decreased active and passive range of motion lumbar flexion and extension limited by pain, MMT 5/5 bilateral lower extremities, sensation grossly intact to light touch, DTRs within normal limits, positive straight leg raise in the seated position radicular pain into the right leg    Imaging      MRI lumbar spine wo contrast    Status: Final result               PACS Images     Show images for MRI lumbar spine wo contrast      MRI lumbar spine wo contrast: Result Notes     Nena Ramirez DO   10/28/2022  4:15 PM EDT         Alondra Beatty had an MRI of his lumbar spine   It showed severe spinal stenosis which is pressing on 1 of the nerves on the left side of his lower back  Romero Hung has an upcoming appointment with pain management on 11/01 and they can discuss treatments for him               Study Result    Narrative & Impression   MRI LUMBAR SPINE WITHOUT CONTRAST     INDICATION: M54 16: Radiculopathy, lumbar region  M41 9: Scoliosis, unspecified      COMPARISON:  None      TECHNIQUE:  Sagittal T1, sagittal T2, sagittal inversion recovery, axial T1 and axial T2, coronal T2     IMAGE QUALITY:  Diagnostic     FINDINGS:     VERTEBRAL BODIES:  There are 5 lumbar type vertebral bodies  Focal dextroscoliosis of the lumbar spine centered at L3 with mild right lateral subluxation of L3 in relation to L2 and L4  Straightening of the normal lumbar lordosis    Type II fatty   endplate marrow degenerative change at the L2-3 level      SACRUM:  Normal signal within the sacrum  No evidence of insufficiency or stress fracture      DISTAL CORD AND CONUS:  Normal size and signal within the distal cord and conus      PARASPINAL SOFT TISSUES:  Paraspinal soft tissues are unremarkable      LOWER THORACIC DISC SPACES:  Mild lower thoracic degenerative change at T10-11 and T11-12 on the right primarily involving facet degenerative change with mild to moderate foraminal narrowing  No cord compression  T12-L1 level is normal      LUMBAR DISC SPACES:     L1-L2:  Slight disc desiccation  Small right foraminal disc protrusion with mild right foraminal narrowing  No cauda equina or foraminal nerve impingement      L2-L3:  Disc desiccation and loss of disc height  Annular bulging with endplate hypertrophic degenerative change  Moderate canal stenosis with particular distortion of the left anterior lateral aspect of the thecal sac  There is moderate left   foraminal narrowing and mild right foraminal narrowing      L3-L4:  Disc desiccation and loss of disc height  Annular bulging with a small broad-based left paramedian disc herniation extending through the neural foramen  Mild left greater than right facet hypertrophic degenerative change  Moderate primarily   left-sided canal stenosis with distortion of the anterior lateral aspect of the thecal sac and moderate left foraminal narrowing with compression of the exiting nerve      L4-L5:  Loss of disc height with annular bulging and a small broad-based central disc protrusion  Mild facet arthropathy with mild to moderate ligamentum flavum thickening  There is severe tricompartmental canal stenosis, see series 6 image 18  Mild   bilateral foraminal narrowing      L5-S1:  Disc desiccation and loss of disc height  Mild annular bulging with mild endplate and facet arthropathy  No disc herniation or canal stenosis    Mild distal right foraminal narrowing      IMPRESSION:     Scoliosis of the lumbar spine with lumbar degenerative disc disease most prominent at the L2-3 and L3-4 levels on the left where there is moderate canal stenosis and left foraminal narrowing  Compression and displacement of the left L3 nerve is seen  Correlate for corresponding radiculopathy      Severe canal stenosis at the L4-5 level secondary to annular bulging, central disc herniation and posterior element hypertrophic change, see series 6 image 18      Spine surgery consultation recommended      Workstation performed: [id number]        Imaging    MRI lumbar spine wo contrast (Order: [id number]) - 10/25/2022    Result History    MRI lumbar spine wo contrast (Order #[id number]) on 10/28/2022 - Order Result History Report    Order Report     Order Details      Order Questions    Question Answer   What is the patient's sedation requirement? No Sedation   Metallic implants? No   Note: Answer Yes or No   Does this procedure require the 3T MRI at [facility]? No   Release to patient through [facility] Immediate   Is order priority selected as STAT? No   Exam reason low back pain   Note: Enter reason for exam   Test performed Elective- non urgent              Result Information    Status Priority Source   Final result (10/28/2022  2:10 PM) Routine        MRI lumbar spine wo contrast: Result Notes     Deepa Cochran DO   10/28/2022  4:15 PM EDT          Paul had an MRI of his lumbar spine   It showed severe spinal stenosis which is pressing on 1 of the nerves on the left side of his lower back  Aliya Schaefer has an upcoming appointment with pain management on 11/01 and they can discuss treatments for him             Reason for Exam    low back pain; Low back pain, symptoms persist with > 6wks conservative treatment; low back pain   Dx: Lumbar radiculopathy, acute [M54 16 (ICD-10-CM)];  Scoliosis of lumbar spine, unspecified scoliosis type [M41 9 (ICD-10-CM)]     All Reviewers List    Alejandro Triana on 10/28/2022  4:21 PM   Deepa Cochran DO on 10/28/2022  4:15 PM         MRI lumbar spine wo contrast: Patient Communication     Add Comments   Add Notifications          Signed by    Signed Date/Time  Phone Pager   Patito Black 10/28/2022 14:10 [phone]        Exam Information    Status Exam Begun  Exam Ended  Performing Tech   Final [99] 10/25/2022 13:08 10/25/2022 13:28 Osker Dine       Screening Form Questions     important suggestion  Questionnaires are displayed in the order they were answered  Answer Comment   Has patient changed into the appropriate hospital gown? Yes    What are your symptoms? low back pain    Do you have a cardiac pacemaker or internal defibrillator? NA    Please list /make/model:     Have you had any HEART surgery? Other    Please list make/model:     Heart surgery: If "other", please describe: Coronary artery bypass graft    Have you had any BRAIN surgery? Craniotomy    Please list  or describe implant:      Brain surgery: If "other", please describe:     Have you had any EYE surgery? None    Eye surgery: If "other", please describe:     Have you ever injured your eyes with metal or metal fragments (grinding,metallic slivers)? No    Eye injury: Please describe:     Have you had any EAR surgery? None    Ear surgery: If "other", please describe:      Do you have an electronic "stimulation" device? None    Please provide  information:     Have you had any abdominal or pelvic surgery? No    Have you had any of the following abdominal or pelvic surgeries:     Abdominal/pelvic surgery: If "other", please describe:     Do you have any ORTHOPEDIC implants/devices? None    Do you have the following: None    Do you have liver disease? None    Do you have kidney disease? None    Have you had a problem with a previous MRI? No    Does the patient require pre-medication? Do you have a personal history of cancer?  None    If "other", please specify:       Are you wearing medication patches?   No    Are you wearing any of the following: None    Did the patient provide this information? Yes    Who provided this information (if not the patient)? Relationship to the patient:     Contact number:     MRI STAFF ONLY- Prior imaging reviewed in PACS (initials): kashif    MRI STAFF ONLY- Epic chart reviewed (initials): kashif    MRI STAFF ONLY: The patient was scheduled to receive MRI contrast and has received the Medication Guide  No contrast given        External Results Report    Open External Results Report      Encounter    View Encounter                     Patient Care Timeline    No data selected in time range    Pre-op Summary    Pre-op             Recovery Summary    Recovery                 Routing History    None         No orders to display       No orders of the defined types were placed in this encounter

## 2022-11-09 ENCOUNTER — HOSPITAL ENCOUNTER (OUTPATIENT)
Facility: AMBULARY SURGERY CENTER | Age: 82
Setting detail: OUTPATIENT SURGERY
Discharge: HOME | End: 2022-11-09
Attending: PHYSICAL MEDICINE & REHABILITATION | Admitting: PHYSICAL MEDICINE & REHABILITATION

## 2022-11-09 ENCOUNTER — APPOINTMENT (OUTPATIENT)
Dept: RADIOLOGY | Facility: HOSPITAL | Age: 82
End: 2022-11-09

## 2022-11-09 VITALS
TEMPERATURE: 97.1 F | HEIGHT: 64 IN | OXYGEN SATURATION: 98 % | HEART RATE: 87 BPM | BODY MASS INDEX: 28.17 KG/M2 | DIASTOLIC BLOOD PRESSURE: 98 MMHG | SYSTOLIC BLOOD PRESSURE: 192 MMHG | RESPIRATION RATE: 20 BRPM | WEIGHT: 165 LBS

## 2022-11-09 RX ORDER — LIDOCAINE HYDROCHLORIDE 10 MG/ML
INJECTION, SOLUTION EPIDURAL; INFILTRATION; INTRACAUDAL; PERINEURAL AS NEEDED
Status: DISCONTINUED | OUTPATIENT
Start: 2022-11-09 | End: 2022-11-09 | Stop reason: HOSPADM

## 2022-11-09 RX ORDER — METHYLPREDNISOLONE ACETATE 80 MG/ML
INJECTION, SUSPENSION INTRA-ARTICULAR; INTRALESIONAL; INTRAMUSCULAR; SOFT TISSUE AS NEEDED
Status: DISCONTINUED | OUTPATIENT
Start: 2022-11-09 | End: 2022-11-09 | Stop reason: HOSPADM

## 2022-11-09 NOTE — OP NOTE
OPERATIVE REPORT  PATIENT NAME: Rasheeda Mccormick    :  1940  MRN: [id number]  Pt Location: [address] [address] MINOR/PAIN ROOM 01    SURGERY DATE: 2022    Surgeon(s) and Role:     * Didi Cedillo DO - Primary    Preop Diagnosis:  Lumbar radiculopathy, acute [M54 16]    Post-Op Diagnosis Codes:     * Lumbar radiculopathy, acute [M54 16]    Procedure(s) (LRB):  L5 S1 LUMBAR epidural steroid injection ( 31718) (N/A)        EBL:  none  Specimens:  not applicable    After discussing the risks, benefits, and alternatives to the procedure, the patient expressed understanding and wished to proceed  The patient was brought to the fluoroscopy suite and placed in the prone position  A procedural pause was conducted to verify:  correct patient identity, procedure to be performed and as applicable, correct side and site, correct patient position, and availability of implants, special equipment and special requirements  After identifying the L5-S1 space fluoroscopically, the skin was sterilely prepped and draped in the usual fashion using Chloraprep skin prep  The skin and subcutaneous tissues were anesthetized with 1% lidocaine  Utilizing a loss of resistance technique and intermittent fluoroscopic guidance, a 3 5 in 20 gauge Tuohy needle was advanced into the epidural space  Proper needle positioning was confirmed using multiple fluoroscopic views  After negative aspiration, Omnipaque 240 contrast was injected confirming epidural spread without evidence of intravascular or intrathecal spread  A 4 ml solution consisting of 80 mg of Depo-Medrol in sterile saline was injected slowly and incrementally into the epidural space  Following the injection the needle was withdrawn slightly and flushed with 1% buffered lidocaine as it was fully extracted  The patient tolerated the procedure well and there were no apparent complications    After appropriate observation, the patient was dismissed from the clinic in good condition under their own power                          SIGNATURE: Ginnie Leyden,   DATE: November 9, 2022  TIME: 2:18 PM

## 2022-11-09 NOTE — INTERVAL H&P NOTE
H&P reviewed  After examining the patient I find no changes in the patients condition since the H&P had been written      Vitals:    11/09/22 1356   BP: 166/93   Pulse:    Resp:    Temp:    SpO2:

## 2022-11-09 NOTE — DISCHARGE INSTRUCTIONS
Epidural Steroid Injection   WHAT YOU NEED TO KNOW:   An epidural steroid injection (DAREN) is a procedure to inject steroid medicine into the epidural space  The epidural space is between your spinal cord and vertebrae  Steroids reduce inflammation and fluid buildup in your spine that may be causing pain  You may be given pain medicine along with the steroids  ACTIVITY  Do not drive or operate machinery today  No strenuous activity today - bending, lifting, etc   You may resume normal activites starting tomorrow - start slowly and as tolerated  You may shower today, but no tub baths or hot tubs  You may have numbness for several hours from the local anesthetic  Please use caution and common sense, especially with weight-bearing activities  CARE OF THE INJECTION SITE  If you have soreness or pain, apply ice to the area today (20 minutes on/20 minutes off)  Starting tomorrow, you may use warm, moist heat or ice if needed  You may have an increase or change in your discomfort for 36-48 hours after your treatment  Apply ice and continue with any pain medication you have been prescribed  Notify the [facility] if you have any of the following: redness, drainage, swelling, headache, stiff neck or fever above 100°F     SPECIAL INSTRUCTIONS  Our office will contact you in approximately 7 days for a progress report  MEDICATIONS  Continue to take all routine medications  Our office may have instructed you to hold some medications  As no general anesthesia was used in today's procedure, you should not experience any side effects related to anesthesia  If you are diabetic, the steroids used in today's injection may temporarily increase your blood sugar levels after the first few days after your injection  Please keep a close eye on your sugars and alert the doctor who manages your diabetes if your sugars are significantly high from your baseline or you are symptomatic       If you have a problem specifically related to your procedure, please call our office at [phone]  Problems not related to your procedure should be directed to your primary care physician

## 2022-11-14 ENCOUNTER — TELEPHONE (OUTPATIENT)
Dept: PAIN MEDICINE | Facility: CLINIC | Age: 82
End: 2022-11-14

## 2022-11-14 NOTE — TELEPHONE ENCOUNTER
Caller: patient    Doctor: Pallavi Solano    Reason for call: patient needs a call to discuss his non progress after procedure    Call back#: [phone]

## 2022-11-14 NOTE — TELEPHONE ENCOUNTER
Christina  S/w pt. S/p 11/9 LESI  Pt states improvement in right sided pain. Still has left sided pain with new tingling sensation to leg.   Advised pt it can take 1-2 weeks for full benefit and to give injection more time to work  Aware SPA will cb at 1 week post injection

## 2022-11-16 ENCOUNTER — TELEPHONE (OUTPATIENT)
Dept: PAIN MEDICINE | Facility: CLINIC | Age: 82
End: 2022-11-16

## 2022-11-16 NOTE — TELEPHONE ENCOUNTER
Pt reports 60% improvement post inj  Pain level 6-7/10    Patient is aware I will call back next week to get an update.

## 2022-12-02 DIAGNOSIS — F41.9 ANXIETY: Primary | ICD-10-CM

## 2022-12-07 ENCOUNTER — OFFICE VISIT (OUTPATIENT)
Dept: PAIN MEDICINE | Facility: CLINIC | Age: 82
End: 2022-12-07

## 2022-12-07 VITALS
WEIGHT: 165 LBS | TEMPERATURE: 98.5 F | HEART RATE: 78 BPM | SYSTOLIC BLOOD PRESSURE: 129 MMHG | DIASTOLIC BLOOD PRESSURE: 68 MMHG | RESPIRATION RATE: 19 BRPM | BODY MASS INDEX: 28.32 KG/M2

## 2022-12-07 DIAGNOSIS — G89.4 CHRONIC PAIN SYNDROME: Primary | ICD-10-CM

## 2022-12-07 DIAGNOSIS — M54.16 LUMBAR RADICULOPATHY: ICD-10-CM

## 2022-12-07 DIAGNOSIS — M41.9 SCOLIOSIS, UNSPECIFIED SCOLIOSIS TYPE, UNSPECIFIED SPINAL REGION: ICD-10-CM

## 2022-12-07 DIAGNOSIS — M51.37 DDD (DEGENERATIVE DISC DISEASE), LUMBOSACRAL: ICD-10-CM

## 2022-12-07 DIAGNOSIS — M48.062 SPINAL STENOSIS OF LUMBAR REGION WITH NEUROGENIC CLAUDICATION: ICD-10-CM

## 2022-12-07 RX ORDER — GABAPENTIN 100 MG/1
CAPSULE ORAL
Qty: 75 CAPSULE | Refills: 0 | Status: SHIPPED | OUTPATIENT
Start: 2022-12-07 | End: 2023-01-06

## 2022-12-07 NOTE — PROGRESS NOTES
Pain Medicine Follow-Up Note    Assessment:  1  Chronic pain syndrome    2  Lumbar radiculopathy    3  Scoliosis, unspecified scoliosis type, unspecified spinal region    4  Spinal stenosis of lumbar region with neurogenic claudication    5  DDD (degenerative disc disease), lumbosacral        Plan:    New Medications Ordered This Visit   Medications   • gabapentin (NEURONTIN) 100 mg capsule     Sig: Take 1 capsule (100 mg total) by mouth daily at bedtime for 5 days, THEN 1 capsule (100 mg total) 2 (two) times a day for 5 days, THEN 1 capsule (100 mg total) 3 (three) times a day for 20 days  Dispense:  75 capsule     Refill:  0       My impressions and treatment recommendations were discussed in detail with the patient who verbalized understanding and had no further questions  Patient follows up from a L5-S1 lumbar epidural steroid injection that he received on 11/9/2022  Patient states that he had approximately 3 weeks of excellent pain relief but then it unfortunately fizzled out  Due to this being the patient's first epidural steroid injection within this region I recommend that the patient undergo a second L5-S1 lumbar epidural steroid injection for I feel will be therapeutic for the patient and may last much longer  I also feel that the patient should trial drip gabapentin 100 mg patient educated on the medication including the side effects as well as not to stop the medication suddenly after being on the medication for an extended period of time and to also continue the medication consistently in order to receive benefit  Patient given instructions on how to titrate the medication up  Patient is in agreement with this plan  Complete risks and benefits including bleeding, infection, tissue reaction, nerve injury and allergic reaction were discussed  The approach was demonstrated using models and literature was provided  Verbal and written consent was obtained      Follow-up is planned in 4 weeks time or sooner as warranted  Discharge instructions were provided  I personally saw and examined the patient and I agree with the above discussed plan of care  History of Present Illness:    Kia Palmer is a 80 y o  male who presents to [facility] for interval re-evaluation of the above stated pain complaints  The patient has a past medical and chronic pain history as outlined in the assessment section  He was last seen on 11/9/2022  At today's visit patient states that his pain symptoms are the same with a pain score of 8 out of 10 on the verbal numeric pain scale  The patient states that on 11/9/2022 he had an L5-S1 lumbar epidural steroid injection which provided him approximately 3 weeks relief of his pain  The patient's pain is worse in the evening and at night  The patient's pain is intermittent in nature  The quality of the patient's pain is described as sharp, shooting, and pins-and-needles  The patient indicates that his pain is located in his bilateral low back and his left lateral thigh and his bilateral ankles/feet  Other than as stated above, the patient denies any interval changes in medications, medical condition, mental condition, symptoms, or allergies since the last office visit  Review of Systems:    Review of Systems   Constitutional: Negative for unexpected weight change  HENT: Negative for ear pain  Eyes: Negative for visual disturbance  Respiratory: Negative for shortness of breath and wheezing  Gastrointestinal: Negative for abdominal pain  Musculoskeletal: Positive for back pain, gait problem and joint swelling  Left hip and lower calves and ancle pain b/L  Decreased ROM   Neurological: Positive for weakness  Negative for numbness  Psychiatric/Behavioral: Positive for sleep disturbance  Negative for decreased concentration  History reviewed  No pertinent past medical history      Past Surgical History:   Procedure Laterality Date   • BRAIN SURGERY     • CORONARY ARTERY BYPASS GRAFT     • LUMBAR EPIDURAL INJECTION N/A 11/9/2022    Procedure: L5 S1 LUMBAR epidural steroid injection ( 22978); Surgeon: Gustavo Mcdonald DO;  Location: [facility] MAIN OR;  Service: Pain Management    • URINARY SURGERY         Family History   Problem Relation Age of Onset   • Alzheimer's disease Mother    • Heart disease Father    • Mental illness Neg Hx        Social History     Occupational History   • Not on file   Tobacco Use   • Smoking status: Former   • Smokeless tobacco: Never   Substance and Sexual Activity   • Alcohol use: Not on file   • Drug use: Not on file   • Sexual activity: Not on file         Current Outpatient Medications:   •  ASPIRIN 81 PO, Take by mouth, Disp: , Rfl:   •  atorvastatin (LIPITOR) 80 mg tablet, Take 1 tablet (80 mg total) by mouth daily, Disp: 90 tablet, Rfl: 1  •  gabapentin (NEURONTIN) 100 mg capsule, Take 1 capsule (100 mg total) by mouth daily at bedtime for 5 days, THEN 1 capsule (100 mg total) 2 (two) times a day for 5 days, THEN 1 capsule (100 mg total) 3 (three) times a day for 20 days  , Disp: 75 capsule, Rfl: 0  •  LOSARTAN POTASSIUM PO, Take by mouth, Disp: , Rfl:   •  Multiple Vitamins-Minerals (PRESERVISION AREDS 2 PO), Take by mouth, Disp: , Rfl:   •  nitroglycerin (NITROLINGUAL) 0 4 mg/spray spray, Place 1 spray under the tongue every 5 (five) minutes as needed for chest pain, Disp: 4 9 g, Rfl: 1  •  sertraline (ZOLOFT) 50 mg tablet, Take 1 tablet (50 mg total) by mouth daily, Disp: 30 tablet, Rfl: 0    No Known Allergies    Physical Exam:    /68   Pulse 78   Temp 98 5 °F (36 9 °C)   Resp 19   Wt 74 8 kg (165 lb)   BMI 28 32 kg/m²     Constitutional:normal, well developed, well nourished, alert, in no distress and non-toxic and no overt pain behavior    Eyes:anicteric  HEENT:grossly intact  Neck:supple, symmetric, trachea midline and no masses   Pulmonary:even and unlabored  Cardiovascular:No edema or pitting edema present  Skin:Normal without rashes or lesions and well hydrated  Psychiatric:Mood and affect appropriate  Neurologic:Cranial Nerves II-XII grossly intact  Musculoskeletal:antalgic, shuffling and stooped posture   Left modified straight leg: Positive  Right modified straight leg: Negative  Left lumbar facet loading: Positive  Right lumbar facet loading: positive

## 2022-12-07 NOTE — H&P (VIEW-ONLY)
Pain Medicine Follow-Up Note    Assessment:  1  Chronic pain syndrome    2  Lumbar radiculopathy    3  Scoliosis, unspecified scoliosis type, unspecified spinal region    4  Spinal stenosis of lumbar region with neurogenic claudication    5  DDD (degenerative disc disease), lumbosacral        Plan:    New Medications Ordered This Visit   Medications   • gabapentin (NEURONTIN) 100 mg capsule     Sig: Take 1 capsule (100 mg total) by mouth daily at bedtime for 5 days, THEN 1 capsule (100 mg total) 2 (two) times a day for 5 days, THEN 1 capsule (100 mg total) 3 (three) times a day for 20 days  Dispense:  75 capsule     Refill:  0       My impressions and treatment recommendations were discussed in detail with the patient who verbalized understanding and had no further questions  Patient follows up from a L5-S1 lumbar epidural steroid injection that he received on 11/9/2022  Patient states that he had approximately 3 weeks of excellent pain relief but then it unfortunately fizzled out  Due to this being the patient's first epidural steroid injection within this region I recommend that the patient undergo a second L5-S1 lumbar epidural steroid injection for I feel will be therapeutic for the patient and may last much longer  I also feel that the patient should trial drip gabapentin 100 mg patient educated on the medication including the side effects as well as not to stop the medication suddenly after being on the medication for an extended period of time and to also continue the medication consistently in order to receive benefit  Patient given instructions on how to titrate the medication up  Patient is in agreement with this plan  Complete risks and benefits including bleeding, infection, tissue reaction, nerve injury and allergic reaction were discussed  The approach was demonstrated using models and literature was provided  Verbal and written consent was obtained      Follow-up is planned in 4 weeks time or sooner as warranted  Discharge instructions were provided  I personally saw and examined the patient and I agree with the above discussed plan of care  History of Present Illness:    Blanca Cowden is a 80 y o  male who presents to [facility] for interval re-evaluation of the above stated pain complaints  The patient has a past medical and chronic pain history as outlined in the assessment section  He was last seen on 11/9/2022  At today's visit patient states that his pain symptoms are the same with a pain score of 8 out of 10 on the verbal numeric pain scale  The patient states that on 11/9/2022 he had an L5-S1 lumbar epidural steroid injection which provided him approximately 3 weeks relief of his pain  The patient's pain is worse in the evening and at night  The patient's pain is intermittent in nature  The quality of the patient's pain is described as sharp, shooting, and pins-and-needles  The patient indicates that his pain is located in his bilateral low back and his left lateral thigh and his bilateral ankles/feet  Other than as stated above, the patient denies any interval changes in medications, medical condition, mental condition, symptoms, or allergies since the last office visit  Review of Systems:    Review of Systems   Constitutional: Negative for unexpected weight change  HENT: Negative for ear pain  Eyes: Negative for visual disturbance  Respiratory: Negative for shortness of breath and wheezing  Gastrointestinal: Negative for abdominal pain  Musculoskeletal: Positive for back pain, gait problem and joint swelling  Left hip and lower calves and ancle pain b/L  Decreased ROM   Neurological: Positive for weakness  Negative for numbness  Psychiatric/Behavioral: Positive for sleep disturbance  Negative for decreased concentration  History reviewed  No pertinent past medical history      Past Surgical History:   Procedure Laterality Date   • BRAIN SURGERY     • CORONARY ARTERY BYPASS GRAFT     • LUMBAR EPIDURAL INJECTION N/A 11/9/2022    Procedure: L5 S1 LUMBAR epidural steroid injection ( 95674); Surgeon: Paul Sue DO;  Location: [facility] MAIN OR;  Service: Pain Management    • URINARY SURGERY         Family History   Problem Relation Age of Onset   • Alzheimer's disease Mother    • Heart disease Father    • Mental illness Neg Hx        Social History     Occupational History   • Not on file   Tobacco Use   • Smoking status: Former   • Smokeless tobacco: Never   Substance and Sexual Activity   • Alcohol use: Not on file   • Drug use: Not on file   • Sexual activity: Not on file         Current Outpatient Medications:   •  ASPIRIN 81 PO, Take by mouth, Disp: , Rfl:   •  atorvastatin (LIPITOR) 80 mg tablet, Take 1 tablet (80 mg total) by mouth daily, Disp: 90 tablet, Rfl: 1  •  gabapentin (NEURONTIN) 100 mg capsule, Take 1 capsule (100 mg total) by mouth daily at bedtime for 5 days, THEN 1 capsule (100 mg total) 2 (two) times a day for 5 days, THEN 1 capsule (100 mg total) 3 (three) times a day for 20 days  , Disp: 75 capsule, Rfl: 0  •  LOSARTAN POTASSIUM PO, Take by mouth, Disp: , Rfl:   •  Multiple Vitamins-Minerals (PRESERVISION AREDS 2 PO), Take by mouth, Disp: , Rfl:   •  nitroglycerin (NITROLINGUAL) 0 4 mg/spray spray, Place 1 spray under the tongue every 5 (five) minutes as needed for chest pain, Disp: 4 9 g, Rfl: 1  •  sertraline (ZOLOFT) 50 mg tablet, Take 1 tablet (50 mg total) by mouth daily, Disp: 30 tablet, Rfl: 0    No Known Allergies    Physical Exam:    /68   Pulse 78   Temp 98 5 °F (36 9 °C)   Resp 19   Wt 74 8 kg (165 lb)   BMI 28 32 kg/m²     Constitutional:normal, well developed, well nourished, alert, in no distress and non-toxic and no overt pain behavior    Eyes:anicteric  HEENT:grossly intact  Neck:supple, symmetric, trachea midline and no masses   Pulmonary:even and unlabored  Cardiovascular:No edema or pitting edema present  Skin:Normal without rashes or lesions and well hydrated  Psychiatric:Mood and affect appropriate  Neurologic:Cranial Nerves II-XII grossly intact  Musculoskeletal:antalgic, shuffling and stooped posture   Left modified straight leg: Positive  Right modified straight leg: Negative  Left lumbar facet loading: Positive  Right lumbar facet loading: positive

## 2022-12-08 NOTE — PATIENT INSTRUCTIONS
Gabapentin (By mouth)   Gabapentin (vianey-a-PEN-tin)  Treats seizures and pain caused by shingles  Brand Name(s): FusePaq Fanatrex, Neurontin   There may be other brand names for this medicine  When This Medicine Should Not Be Used: This medicine is not right for everyone  Do not use it if you had an allergic reaction to gabapentin  How to Use This Medicine:   Capsule, Liquid, Tablet  Take your medicine as directed  Your dose may need to be changed several times to find what works best for you  If you have epilepsy, do not allow more than 12 hours to pass between doses  Capsule: Swallow the capsule whole with plenty of water  Do not open, crush, or chew it  Gralise® tablet: Swallow the tablet whole   Do not crush, break, or chew it  Neurontin® tablet: If you break a tablet into 2 pieces, use the second half as your next dose  Do not use the half-tablet if the whole tablet has been cut or broken after 28 days  Oral liquid: Measure the oral liquid medicine with a marked measuring spoon, oral syringe, or medicine cup  This medicine should come with a Medication Guide  Ask your pharmacist for a copy if you do not have one  Missed dose: Take a dose as soon as you remember  If it is almost time for your next dose, wait until then and take a regular dose  Do not take extra medicine to make up for a missed dose  Store the medicine in a closed container at room temperature, away from heat, moisture, and direct light  Store the Neurontin® oral liquid in the refrigerator  Do not freeze  Drugs and Foods to Avoid:   Ask your doctor or pharmacist before using any other medicine, including over-the-counter medicines, vitamins, and herbal products  Some medicines can affect how gabapentin works  Tell your doctor if you also using hydrocodone or morphine  If you take an antacid, wait at least 2 hours before you take gabapentin  Do not drink alcohol while you are using this medicine    Tell your doctor if you use anything else that makes you sleepy  Some examples are allergy medicine, narcotic pain medicine, and alcohol  Tell your doctor if you are also using lorazepam, oxycodone, or zolpidem  Warnings While Using This Medicine:   Tell your doctor if you are pregnant or breastfeeding, or if you have kidney problems (including patients receiving dialysis) or lung problems  Tell your doctor if you have a history of depression or mental health problems  This medicine may cause the following problems:  Drug reaction with eosinophilia and systemic symptoms (DRESS) or multiorgan hypersensitivity, which may damage the liver, kidney, blood, heart, or muscles  Changes in mood or behavior, including suicidal thoughts or behavior  Respiratory depression (serious breathing problem that can be life-threatening), when used with narcotic pain medicines  Do not stop using this medicine suddenly  Your doctor will need to slowly decrease your dose before you stop it completely  This medicine may make you dizzy or drowsy  Do not drive or do anything else that could be dangerous until you know how this medicine affects you  Tell any doctor or dentist who treats you that you are using this medicine  This medicine may affect certain medical test results  Your doctor will check your progress and the effects of this medicine at regular visits  Keep all appointments  Keep all medicine out of the reach of children  Never share your medicine with anyone  Possible Side Effects While Using This Medicine:   Call your doctor right away if you notice any of these side effects:   Allergic reaction: Itching or hives, swelling in your face or hands, swelling or tingling in your mouth or throat, chest tightness, trouble breathing  Behavior problems, aggression, restlessness, trouble concentrating, moodiness (especially in children)  Blistering, peeling, red skin rash  Blue lips, fingernails, or skin, chest pain, fast heartbeat, trouble breathing  Change in how much or how often you urinate, bloody or cloudy urine  Dark urine or pale stools, nausea, vomiting, loss of appetite, stomach pain, yellow skin or eyes  Fever, chills, cough, sore throat, body aches  Problems with coordination, shakiness, unsteadiness, unusual eye movement  Rapid weight gain, swelling in your hands, ankles, or feet  Rash, swollen or tender glands in the neck, armpit, or groin  Unusual moods or behaviors, thoughts of hurting yourself, feeling depressed  If you notice these less serious side effects, talk with your doctor:   Dizziness, drowsiness, sleepiness, tiredness  If you notice other side effects that you think are caused by this medicine, tell your doctor  Call your doctor for medical advice about side effects  You may report side effects to FDA at 3-322-FDA-1335    © Copyright [employer] 2022 Information is for End User's use only and may not be sold, redistributed or otherwise used for commercial purposes  The above information is an  only  It is not intended as medical advice for individual conditions or treatments  Talk to your doctor, nurse or pharmacist before following any medical regimen to see if it is safe and effective for you  Epidural Steroid Injection, Ambulatory Care   GENERAL INFORMATION:   What do I need to know about an epidural steroid injection? An epidural steroid injection (DAREN) is a procedure to inject steroid medicine into the epidural space  The epidural space is between your spinal cord and vertebrae  Steroids reduce inflammation and fluid buildup in your spine that may be causing pain  You may be given pain medicine along with the steroids  How do I prepare for an DAREN? Your healthcare provider will talk to you about how to prepare for your procedure  He will tell you what medicines to take or not take on the day of your procedure  You may need to stop taking blood thinners or other medicines several days before your procedure   You may need to adjust any diabetes medicine you take on the day of your procedure  Steroid medicine can increase your blood sugar level  What will happen during an DAREN? You will be given medicine to numb the procedure area  You will be awake for the procedure, but you will not feel pain  You may also be given medicine to help you relax during the procedure  Contrast liquid will be used to help your healthcare provider see the area better  Tell the healthcare provider if you have ever had an allergic reaction to contrast liquid  Your healthcare provider may place the needle into your neck area, middle of your back, or tailbone area  He may inject the medicine next to the nerves that are causing your pain  He may instead inject the medicine into a larger area of the epidural space  This helps the medicine spread to more nerves  Your healthcare provider will use a fluoroscope to help guide the needle to the right place  A fluoroscope is a type of x-ray  After the procedure, a bandage will be placed over the injection site to prevent infection  What are the risks of an DAREN? You may have temporary or permanent nerve damage or paralysis  You may have bleeding or develop a serious infection, such as meningitis (swelling of the brain coverings)  An abscess may also develop  You may need surgery to fix the abscess  You may have a seizure, anxiety, or trouble sleeping  If you are a man, you may have temporary erectile dysfunction (not able to have an erection)  CARE AGREEMENT:   You have the right to help plan your care  Learn about your health condition and how it may be treated  Discuss treatment options with your caregivers to decide what care you want to receive  You always have the right to refuse treatment  The above information is an  only  It is not intended as medical advice for individual conditions or treatments   Talk to your doctor, nurse or pharmacist before following any medical regimen to see if it is safe and effective for you  © 2014 [address] is for End User's use only and may not be sold, redistributed or otherwise used for commercial purposes  All illustrations and images included in CareNotes® are the copyrighted property of A D A M [employer]  or [employer]

## 2022-12-13 ENCOUNTER — OFFICE VISIT (OUTPATIENT)
Dept: FAMILY MEDICINE CLINIC | Facility: CLINIC | Age: 82
End: 2022-12-13

## 2022-12-13 VITALS
BODY MASS INDEX: 27.83 KG/M2 | DIASTOLIC BLOOD PRESSURE: 70 MMHG | RESPIRATION RATE: 17 BRPM | HEART RATE: 90 BPM | HEIGHT: 64 IN | OXYGEN SATURATION: 97 % | TEMPERATURE: 97.2 F | SYSTOLIC BLOOD PRESSURE: 130 MMHG | WEIGHT: 163 LBS

## 2022-12-13 DIAGNOSIS — M48.061 SPINAL STENOSIS OF LUMBAR REGION, UNSPECIFIED WHETHER NEUROGENIC CLAUDICATION PRESENT: ICD-10-CM

## 2022-12-13 DIAGNOSIS — I10 ESSENTIAL HYPERTENSION: ICD-10-CM

## 2022-12-13 DIAGNOSIS — E78.2 MIXED HYPERLIPIDEMIA: ICD-10-CM

## 2022-12-13 DIAGNOSIS — F41.9 ANXIETY: ICD-10-CM

## 2022-12-13 DIAGNOSIS — I25.10 CORONARY ARTERY DISEASE INVOLVING NATIVE CORONARY ARTERY OF NATIVE HEART WITHOUT ANGINA PECTORIS: ICD-10-CM

## 2022-12-13 DIAGNOSIS — Z95.1 H/O CORONARY ARTERY BYPASS SURGERY: ICD-10-CM

## 2022-12-13 DIAGNOSIS — Z00.00 MEDICARE ANNUAL WELLNESS VISIT, SUBSEQUENT: Primary | ICD-10-CM

## 2022-12-13 PROBLEM — M81.0 AGE-RELATED OSTEOPOROSIS WITHOUT CURRENT PATHOLOGICAL FRACTURE: Status: ACTIVE | Noted: 2022-12-13

## 2022-12-13 NOTE — PROGRESS NOTES
Assessment and Plan:     Problem List Items Addressed This Visit        Cardiovascular and Mediastinum    Essential hypertension     stable         Relevant Orders    CBC    Coronary artery disease involving native coronary artery of native heart without angina pectoris    Relevant Orders    CBC       Other    Anxiety    Relevant Medications    sertraline (ZOLOFT) 50 mg tablet    Other Relevant Orders    CBC    Mixed hyperlipidemia    Relevant Orders    CBC    Comprehensive metabolic panel    Lipid Panel with Direct LDL reflex    H/O coronary artery bypass surgery    Relevant Orders    CBC    Spinal stenosis of lumbar region     Pt states this was dx by ortho - I dont see dx in chart but is on MRI         Relevant Orders    CBC   Other Visit Diagnoses     Medicare annual wellness visit, subsequent    -  Primary    Relevant Orders    CBC        BMI Counseling: Body mass index is 27 98 kg/m²  The BMI is above normal  Nutrition recommendations include decreasing portion sizes  Exercise recommendations include moderate physical activity 150 minutes/week  No pharmacotherapy was ordered  Rationale for BMI follow-up plan is due to patient being overweight or obese  Preventive health issues were discussed with patient, and age appropriate screening tests were ordered as noted in patient's After Visit Summary  Personalized health advice and appropriate referrals for health education or preventive services given if needed, as noted in patient's After Visit Summary       History of Present Illness:     Patient presents for a Medicare Wellness Visit    Pt is sched for an AWV  Pt is asking if he can have a handicapp placard - states its for his spinal stenosis - he has diff walking because of it  Pt sates he asked the orthop group for a parking placard - and states he was told "only the PCP can do it"     Pt states ortho dx him with osteoporosis     Patient Care Team:  Andrew Russell, DO as PCP - General (Family Medicine)  Bennett Kelly DO as PCP - [address] [facility],[address] [facility]  Kinjal Chong DO as Consulting Physician (Sports Medicine)  Paul Sue DO as Consulting Physician (Pain Medicine)  Jeremy Flores MD as Consulting Physician (Cardiology)  Blanca Ceja MD as Consulting Physician (Dermatology)     Review of Systems:     Review of Systems     Problem List:     Patient Active Problem List   Diagnosis   • Anxiety   • Essential hypertension   • Mixed hyperlipidemia   • H/O brain tumor   • H/O coronary artery bypass surgery   • Coronary artery disease involving native coronary artery of native heart without angina pectoris   • Spinal stenosis of lumbar region   • Age-related osteoporosis without current pathological fracture      Past Medical and Surgical History:     History reviewed  No pertinent past medical history  Past Surgical History:   Procedure Laterality Date   • BRAIN SURGERY     • CORONARY ARTERY BYPASS GRAFT     • LUMBAR EPIDURAL INJECTION N/A 11/9/2022    Procedure: L5 S1 LUMBAR epidural steroid injection ( 19018);   Surgeon: Paul Sue DO;  Location: [facility] MAIN OR;  Service: Pain Management    • URINARY SURGERY        Family History:     Family History   Problem Relation Age of Onset   • Alzheimer's disease Mother    • Heart disease Father    • Mental illness Neg Hx       Social History:     Social History     Socioeconomic History   • Marital status: /Civil Union     Spouse name: None   • Number of children: None   • Years of education: None   • Highest education level: None   Occupational History   • None   Tobacco Use   • Smoking status: Former   • Smokeless tobacco: Never   Substance and Sexual Activity   • Alcohol use: None   • Drug use: None   • Sexual activity: None   Other Topics Concern   • None   Social History Narrative   • None     Social Determinants of Health     Financial Resource Strain: Low Risk    • Difficulty of Paying Living Expenses: Not hard at all Food Insecurity: Not on file   Transportation Needs: No Transportation Needs   • Lack of Transportation (Medical): No   • Lack of Transportation (Non-Medical): No   Physical Activity: Not on file   Stress: Not on file   Social Connections: Not on file   Intimate Partner Violence: Not on file   Housing Stability: Not on file      Medications and Allergies:     Current Outpatient Medications   Medication Sig Dispense Refill   • ASPIRIN 81 PO Take by mouth     • atorvastatin (LIPITOR) 80 mg tablet Take 1 tablet (80 mg total) by mouth daily 90 tablet 1   • gabapentin (NEURONTIN) 100 mg capsule Take 1 capsule (100 mg total) by mouth daily at bedtime for 5 days, THEN 1 capsule (100 mg total) 2 (two) times a day for 5 days, THEN 1 capsule (100 mg total) 3 (three) times a day for 20 days  75 capsule 0   • LOSARTAN POTASSIUM PO Take by mouth     • Multiple Vitamins-Minerals (PRESERVISION AREDS 2 PO) Take by mouth     • nitroglycerin (NITROLINGUAL) 0 4 mg/spray spray Place 1 spray under the tongue every 5 (five) minutes as needed for chest pain 4 9 g 1   • sertraline (ZOLOFT) 50 mg tablet Take 1 5 tablets (75 mg total) by mouth daily 135 tablet 1     No current facility-administered medications for this visit       No Known Allergies   Immunizations:     Immunization History   Administered Date(s) Administered   • COVID-19 PFIZER VACCINE 0 3 ML IM 03/20/2021, 04/10/2021, 10/20/2021   • H1N1, All Formulations 01/09/2010   • INFLUENZA 12/04/2007, 10/13/2009, 10/25/2012, 10/24/2013, 10/21/2014, 10/05/2015, 10/17/2016, 10/13/2017, 09/21/2018, 08/27/2019, 09/09/2020, 10/13/2021   • Pneumococcal Conjugate 13-Valent 07/29/2015   • Pneumococcal Polysaccharide PPV23 12/04/2007, 03/01/2017   • Zoster 07/22/2010   • Zoster Vaccine Recombinant 08/06/2018, 11/07/2018      Health Maintenance:         Topic Date Due   • Hepatitis C Screening  Completed         Topic Date Due   • Hepatitis B Vaccine (1 of 3 - 3-dose series) Never done   • COVID-19 Vaccine (4 - Booster for Pfizer series) 02/20/2022   • Influenza Vaccine (1) 09/01/2022      Medicare Screening Tests and Risk Assessments:     Geoff Zamora is here for his Subsequent Wellness visit  Last Medicare Wellness visit information reviewed, patient interviewed, no change since last AWV  Health Risk Assessment:   Patient rates overall health as very good  Patient feels that their physical health rating is much worse  Patient is dissatisfied with their life  Eyesight was rated as slightly worse  Hearing was rated as slightly worse  Patient feels that their emotional and mental health rating is same  Patients states they are sometimes angry  Patient states they are sometimes unusually tired/fatigued  Pain experienced in the last 7 days has been a lot  Patient's pain rating has been 8/10  Patient states that he has experienced no weight loss or gain in last 6 months  Fall Risk Screening: In the past year, patient has experienced: history of falling in past year      Home Safety:  Patient does not have trouble with stairs inside or outside of their home  Patient has working smoke alarms and has working carbon monoxide detector  Home safety hazards include: unfamiliar surroundings, loose rugs on the floor, household clutter and not having non-slip bath and/or shower mats  Nutrition:   Current diet is Low Cholesterol and Low Saturated Fat  Medications:   Patient is currently taking over-the-counter supplements  OTC medications include: Areds ll  Patient is able to manage medications  l    Activities of Daily Living (ADLs)/Instrumental Activities of Daily Living (IADLs):   Walk and transfer into and out of bed and chair?: Yes  Dress and groom yourself?: Yes    Bathe or shower yourself?: Yes    Feed yourself?  Yes  Do your laundry/housekeeping?: Yes  Manage your money, pay your bills and track your expenses?: Yes  Make your own meals?: Yes    Do your own shopping?: Yes    Durable Medical Equipment Suppliers  None    Previous Hospitalizations:   Any hospitalizations or ED visits within the last 12 months?: Yes    How many hospitalizations have you had in the last year?: 1-2    Hospitalization Comments: ER    Advance Care Planning:   Living will: Yes    Durable POA for healthcare: Yes    Advanced directive: Yes      Cognitive Screening:   Provider or family/friend/caregiver concerned regarding cognition?: No    PREVENTIVE SCREENINGS      Cardiovascular Screening:    General: Screening Not Indicated, History Lipid Disorder, Risks and Benefits Discussed and Screening Current    Due for: Lipid Panel      Diabetes Screening:     General: Screening Current and Risks and Benefits Discussed    Due for: Blood Glucose      Colorectal Cancer Screening:     General: Risks and Benefits Discussed and Patient Declines      Prostate Cancer Screening:    General: Screening Not Indicated, Risks and Benefits Discussed and Screening Current      Osteoporosis Screening:    General: Risks and Benefits Discussed and Patient Declines      Abdominal Aortic Aneurysm (AAA) Screening:    Risk factors include: tobacco use        General: Screening Not Indicated      Lung Cancer Screening:     General: Screening Not Indicated      Hepatitis C Screening:    General: Screening Current    Screening, Brief Intervention, and Referral to Treatment (SBIRT)    Screening  Typical number of drinks in a day: 2  Typical number of drinks in a week: 14  Interpretation: Low risk drinking behavior  AUDIT-C Screenin) How often did you have a drink containing alcohol in the past year? 4 or more times a week  2) How many drinks did you have on a typical day when you were drinking in the past year?  1 to 2  3) How often did you have 6 or more drinks on one occasion in the past year? never    AUDIT-C Score: 4  Interpretation: Score 4-12 (male): POSITIVE screen for alcohol misuse    AUDIT Screenin) How often during the last year have you found that you were not able to stop drinking once you had started? 0 - never  5) How often during the last year have you failed to do what was normally expected from you because of drinking? 0 - never  6) How often during the last year have you needed a first drink in the morning to get yourself going after a heavy drinking session? 0 - never  7) How often during the last year have you had a feeling of guilt or remorse after drinking? 2 - monthly  8) How often during the last year have you been unable to remember what happened the night before because you had been drinking? 0 - never  9) Have you or someone else been injured as a result of your drinking? 0 - no  10) Has a relative or friend or a doctor or another health worker been concerned about your drinking or suggested you cut down? 0 - no    AUDIT Score: 6  Interpretation: Low risk alcohol consumption    Single Item Drug Screening:  How often have you used an illegal drug (including marijuana) or a prescription medication for non-medical reasons in the past year? never    Single Item Drug Screen Score: 0  Interpretation: Negative screen for possible drug use disorder    Brief Intervention  Alcohol & drug use screenings were reviewed  No concerns regarding substance use disorder identified  No results found       Physical Exam:     /70   Pulse 90   Temp (!) 97 2 °F (36 2 °C)   Resp 17   Ht 5' 4" (1 626 m)   Wt 73 9 kg (163 lb)   SpO2 97%   BMI 27 98 kg/m²     Physical Exam     Recent Results (from the past 2016 hour(s))   CBC    Collection Time: 09/27/22 10:22 AM   Result Value Ref Range    WBC 9 94 4 31 - 10 16 Thousand/uL    RBC 4 44 3 88 - 5 62 Million/uL    Hemoglobin 14 5 12 0 - 17 0 g/dL    Hematocrit 43 8 36 5 - 49 3 %    MCV 99 (H) 82 - 98 fL    MCH 32 7 26 8 - 34 3 pg    MCHC 33 1 31 4 - 37 4 g/dL    RDW 14 7 11 6 - 15 1 %    Platelets 378 866 - 716 Thousands/uL    MPV 9 4 8 9 - 12 7 fL   Comprehensive metabolic panel    Collection Time: 09/27/22 10:22 AM   Result Value Ref Range    Sodium 138 135 - 147 mmol/L    Potassium 4 1 3 5 - 5 3 mmol/L    Chloride 100 96 - 108 mmol/L    CO2 31 21 - 32 mmol/L    ANION GAP 7 4 - 13 mmol/L    BUN 19 5 - 25 mg/dL    Creatinine 1 08 0 60 - 1 30 mg/dL    Glucose, Fasting 83 65 - 99 mg/dL    Calcium 8 5 8 3 - 10 1 mg/dL    Corrected Calcium 9 0 8 3 - 10 1 mg/dL    AST 20 5 - 45 U/L    ALT 32 12 - 78 U/L    Alkaline Phosphatase 49 46 - 116 U/L    Total Protein 6 4 6 4 - 8 4 g/dL    Albumin 3 4 (L) 3 5 - 5 0 g/dL    Total Bilirubin 0 83 0 20 - 1 00 mg/dL    eGFR 64 ml/min/1 73sq m   Hepatitis C antibody    Collection Time: 09/27/22 10:22 AM   Result Value Ref Range    Hepatitis C Ab Non-reactive Non-reactive   Lipid Panel with Direct LDL reflex    Collection Time: 09/27/22 10:22 AM   Result Value Ref Range    Cholesterol 184 See Comment mg/dL    Triglycerides 166 (H) See Comment mg/dL    HDL, Direct 84 >=40 mg/dL    LDL Calculated 67 0 - 100 mg/dL   PSA, Total Screen    Collection Time: 09/27/22 10:22 AM   Result Value Ref Range    PSA 0 2 0 0 - 4 0 ng/mL         Sergio Reyes DO

## 2022-12-13 NOTE — PATIENT INSTRUCTIONS
Medicare Preventive Visit Patient Instructions  Thank you for completing your Welcome to Medicare Visit or Medicare Annual Wellness Visit today  Your next wellness visit will be due in one year (12/14/2023)  The screening/preventive services that you may require over the next 5-10 years are detailed below  Some tests may not apply to you based off risk factors and/or age  Screening tests ordered at today's visit but not completed yet may show as past due  Also, please note that scanned in results may not display below  Preventive Screenings:  Service Recommendations Previous Testing/Comments   Colorectal Cancer Screening  · Colonoscopy    · Fecal Occult Blood Test (FOBT)/Fecal Immunochemical Test (FIT)  · Fecal DNA/Cologuard Test  · Flexible Sigmoidoscopy Age: 39-70 years old   Colonoscopy: every 10 years (May be performed more frequently if at higher risk)  OR  FOBT/FIT: every 1 year  OR  Cologuard: every 3 years  OR  Sigmoidoscopy: every 5 years  Screening may be recommended earlier than age 39 if at higher risk for colorectal cancer  Also, an individualized decision between you and your healthcare provider will decide whether screening between the ages of 74-80 would be appropriate   Colonoscopy: Not on file  FOBT/FIT: Not on file  Cologuard: Not on file  Sigmoidoscopy: Not on file          Prostate Cancer Screening Individualized decision between patient and health care provider in men between ages of 53-78   Medicare will cover every 12 months beginning on the day after your 50th birthday PSA: 0 2 ng/mL     Screening Not Indicated     Hepatitis C Screening Once for adults born between 1945 and 1965  More frequently in patients at high risk for Hepatitis C Hep C Antibody: 09/27/2022    Screening Current   Diabetes Screening 1-2 times per year if you're at risk for diabetes or have pre-diabetes Fasting glucose: 83 mg/dL (9/27/2022)  A1C: No results in last 5 years (No results in last 5 years)  Screening Current   Cholesterol Screening Once every 5 years if you don't have a lipid disorder  May order more often based on risk factors  Lipid panel: 09/27/2022  Screening Not Indicated  History Lipid Disorder      Other Preventive Screenings Covered by Medicare:  1  Abdominal Aortic Aneurysm (AAA) Screening: covered once if your at risk  You're considered to be at risk if you have a family history of AAA or a male between the age of 73-68 who smoking at least 100 cigarettes in your lifetime  2  Lung Cancer Screening: covers low dose CT scan once per year if you meet all of the following conditions: (1) Age 50-69; (2) No signs or symptoms of lung cancer; (3) Current smoker or have quit smoking within the last 15 years; (4) You have a tobacco smoking history of at least 20 pack years (packs per day x number of years you smoked); (5) You get a written order from a healthcare provider  3  Glaucoma Screening: covered annually if you're considered high risk: (1) You have diabetes OR (2) Family history of glaucoma OR (3)  aged 48 and older OR (3)  American aged 72 and older  3  Osteoporosis Screening: covered every 2 years if you meet one of the following conditions: (1) Have a vertebral abnormality; (2) On glucocorticoid therapy for more than 3 months; (3) Have primary hyperparathyroidism; (4) On osteoporosis medications and need to assess response to drug therapy  5  HIV Screening: covered annually if you're between the age of 12-76  Also covered annually if you are younger than 13 and older than 72 with risk factors for HIV infection  For pregnant patients, it is covered up to 3 times per pregnancy      Immunizations:  Immunization Recommendations   Influenza Vaccine Annual influenza vaccination during flu season is recommended for all persons aged >= 6 months who do not have contraindications   Pneumococcal Vaccine   * Pneumococcal conjugate vaccine = PCV13 (Prevnar 13), PCV15 (Vaxneuvance), PCV20 (Prevnar 20)  * Pneumococcal polysaccharide vaccine = PPSV23 (Pneumovax) Adults 2364 years old: 1-3 doses may be recommended based on certain risk factors  Adults 72 years old: 1-2 doses may be recommended based off what pneumonia vaccine you previously received   Hepatitis B Vaccine 3 dose series if at intermediate or high risk (ex: diabetes, end stage renal disease, liver disease)   Tetanus (Td) Vaccine - COST NOT COVERED BY MEDICARE PART B Following completion of primary series, a booster dose should be given every 10 years to maintain immunity against tetanus  Td may also be given as tetanus wound prophylaxis  Tdap Vaccine - COST NOT COVERED BY MEDICARE PART B Recommended at least once for all adults  For pregnant patients, recommended with each pregnancy  Shingles Vaccine (Shingrix) - COST NOT COVERED BY MEDICARE PART B  2 shot series recommended in those aged 48 and above     Health Maintenance Due:      Topic Date Due   • Hepatitis C Screening  Completed     Immunizations Due:      Topic Date Due   • Hepatitis B Vaccine (1 of 3 - 3-dose series) Never done   • COVID-19 Vaccine (4 - Booster for Pfizer series) 02/20/2022   • Influenza Vaccine (1) 09/01/2022     Advance Directives   What are advance directives? Advance directives are legal documents that state your wishes and plans for medical care  These plans are made ahead of time in case you lose your ability to make decisions for yourself  Advance directives can apply to any medical decision, such as the treatments you want, and if you want to donate organs  What are the types of advance directives? There are many types of advance directives, and each state has rules about how to use them  You may choose a combination of any of the following:  · Living will: This is a written record of the treatment you want  You can also choose which treatments you do not want, which to limit, and which to stop at a certain time   This includes surgery, medicine, IV fluid, and tube feedings  · Durable power of  for healthcare [facility]): This is a written record that states who you want to make healthcare choices for you when you are unable to make them for yourself  This person, called a proxy, is usually a family member or a friend  You may choose more than 1 proxy  · Do not resuscitate (DNR) order:  A DNR order is used in case your heart stops beating or you stop breathing  It is a request not to have certain forms of treatment, such as CPR  A DNR order may be included in other types of advance directives  · Medical directive: This covers the care that you want if you are in a coma, near death, or unable to make decisions for yourself  You can list the treatments you want for each condition  Treatment may include pain medicine, surgery, blood transfusions, dialysis, IV or tube feedings, and a ventilator (breathing machine)  · Values history: This document has questions about your views, beliefs, and how you feel and think about life  This information can help others choose the care that you would choose  Why are advance directives important? An advance directive helps you control your care  Although spoken wishes may be used, it is better to have your wishes written down  Spoken wishes can be misunderstood, or not followed  Treatments may be given even if you do not want them  An advance directive may make it easier for your family to make difficult choices about your care  Fall Prevention    Fall prevention  includes ways to make your home and other areas safer  It also includes ways you can move more carefully to prevent a fall  Health conditions that cause changes in your blood pressure, vision, or muscle strength and coordination may increase your risk for falls  Medicines may also increase your risk for falls if they make you dizzy, weak, or sleepy  Fall prevention tips:   · Stand or sit up slowly  · Use assistive devices as directed      · Wear shoes that fit well and have soles that   · Wear a personal alarm  · Stay active  · Manage your medical conditions  Home Safety Tips:  · Add items to prevent falls in the bathroom  · Keep paths clear  · Install bright lights in your home  · Keep items you use often on shelves within reach  · Paint or place reflective tape on the edges of your stairs  Weight Management   Why it is important to manage your weight:  Being overweight increases your risk of health conditions such as heart disease, high blood pressure, type 2 diabetes, and certain types of cancer  It can also increase your risk for osteoarthritis, sleep apnea, and other respiratory problems  Aim for a slow, steady weight loss  Even a small amount of weight loss can lower your risk of health problems  How to lose weight safely:  A safe and healthy way to lose weight is to eat fewer calories and get regular exercise  You can lose up about 1 pound a week by decreasing the number of calories you eat by 500 calories each day  Healthy meal plan for weight management:  A healthy meal plan includes a variety of foods, contains fewer calories, and helps you stay healthy  A healthy meal plan includes the following:  · Eat whole-grain foods more often  A healthy meal plan should contain fiber  Fiber is the part of grains, fruits, and vegetables that is not broken down by your body  Whole-grain foods are healthy and provide extra fiber in your diet  Some examples of whole-grain foods are whole-wheat breads and pastas, oatmeal, brown rice, and bulgur  · Eat a variety of vegetables every day  Include dark, leafy greens such as spinach, kale, sumit greens, and mustard greens  Eat yellow and orange vegetables such as carrots, sweet potatoes, and winter squash  · Eat a variety of fruits every day  Choose fresh or canned fruit (canned in its own juice or light syrup) instead of juice  Fruit juice has very little or no fiber    · Eat low-fat dairy foods  Drink fat-free (skim) milk or 1% milk  Eat fat-free yogurt and low-fat cottage cheese  Try low-fat cheeses such as mozzarella and other reduced-fat cheeses  · Choose meat and other protein foods that are low in fat  Choose beans or other legumes such as split peas or lentils  Choose fish, skinless poultry (chicken or turkey), or lean cuts of red meat (beef or pork)  Before you cook meat or poultry, cut off any visible fat  · Use less fat and oil  Try baking foods instead of frying them  Add less fat, such as margarine, sour cream, regular salad dressing and mayonnaise to foods  Eat fewer high-fat foods  Some examples of high-fat foods include french fries, doughnuts, ice cream, and cakes  · Eat fewer sweets  Limit foods and drinks that are high in sugar  This includes candy, cookies, regular soda, and sweetened drinks  Exercise:  Exercise at least 30 minutes per day on most days of the week  Some examples of exercise include walking, biking, dancing, and swimming  You can also fit in more physical activity by taking the stairs instead of the elevator or parking farther away from stores  Ask your healthcare provider about the best exercise plan for you  Alcohol Use and Your Health    Drinking too much can harm your health  Excessive alcohol use leads to about 88,000 death in the [place] each year, and shortens the life of those who diet by almost 30 years  Further, excessive drinking cost the economy $249 billion in 2010  Most excessive drinkers are not alcohol dependent  Excessive alcohol use has immediate effects that increase the risk of many harmful health conditions  These are most often the result of binge drinking  Over time, excessive alcohol use can lead to the development of chronic diseases and other series health problems  What is considered a "drink"? Excessive alcohol use includes:  · Binge Drinking:  For women, 4 or more drinks consumed on one occasion  For men, 5 or more drinks consumed on one occasion  · Heavy Drinking: For women, 8 or more drinks per week  For men, 15 or more drinks per week  · Any alcohol used by pregnant women  · Any alcohol used by those under the age of 21 years    If you choose to drink, do so in moderation:  · Do not drink at all if you are under the age of 24, or if you are or may be pregnant, or have health problems that could be made worse by drinking  · For women, up to 1 drink per day  · For men, up to 2 drinks a day    No one should begin drinking or drink more frequently based on potential health benefits    Short-Term Health Risks:  · Injuries: motor vehicle crashes, falls, drownings, burns  · Violence: homicide, suicide, sexual assault, intimate partner violence  · Alcohol poisoning  · Reproductive health: risky sexual behaviors, unintended prengnacy, sexually transmitted diseases, miscarriage, stillbirth, fetal alcohol syndrome    Long-Term Health Risks:  · Chronic diseases: high blood pressure, heart disease, stroke, liver disease, digestive problems  · Cancers: breast, mouth and throat, liver, colon  · Learning and memory problems: dementia, poor school performance  · Mental health: depression, anxiety, insomnia  · Social problems: lost productivity, family problems, unemployment  · Alcohol dependence    For support and more information:  · Substance Abuse and [facility] [address] [place]  Web Address: https://[employer]/    · Alcoholics Anonymous        Web Address: http://www james info/    https://www cdc gov/alcohol/fact-sheets/alcohol-use htm     © Copyright [employer] 2018 Information is for End User's use only and may not be sold, redistributed or otherwise used for commercial purposes   All illustrations and images included in CareNotes® are the copyrighted property of [employer]  or [employer]

## 2022-12-15 ENCOUNTER — TELEPHONE (OUTPATIENT)
Dept: FAMILY MEDICINE CLINIC | Facility: CLINIC | Age: 82
End: 2022-12-15

## 2022-12-15 NOTE — TELEPHONE ENCOUNTER
Scanned into chart    Called and spoke with patient and he is aware that form is upfront for     No further action needed

## 2023-01-04 ENCOUNTER — HOSPITAL ENCOUNTER (OUTPATIENT)
Facility: AMBULARY SURGERY CENTER | Age: 83
Setting detail: OUTPATIENT SURGERY
Discharge: HOME | End: 2023-01-04
Attending: PHYSICAL MEDICINE & REHABILITATION | Admitting: PHYSICAL MEDICINE & REHABILITATION

## 2023-01-04 ENCOUNTER — APPOINTMENT (OUTPATIENT)
Dept: RADIOLOGY | Facility: HOSPITAL | Age: 83
End: 2023-01-04

## 2023-01-04 VITALS
HEART RATE: 78 BPM | DIASTOLIC BLOOD PRESSURE: 97 MMHG | RESPIRATION RATE: 18 BRPM | SYSTOLIC BLOOD PRESSURE: 173 MMHG | TEMPERATURE: 97.6 F | OXYGEN SATURATION: 99 %

## 2023-01-04 RX ORDER — METHYLPREDNISOLONE ACETATE 80 MG/ML
INJECTION, SUSPENSION INTRA-ARTICULAR; INTRALESIONAL; INTRAMUSCULAR; SOFT TISSUE AS NEEDED
Status: DISCONTINUED | OUTPATIENT
Start: 2023-01-04 | End: 2023-01-04 | Stop reason: HOSPADM

## 2023-01-04 RX ORDER — LIDOCAINE HYDROCHLORIDE 10 MG/ML
INJECTION, SOLUTION EPIDURAL; INFILTRATION; INTRACAUDAL; PERINEURAL AS NEEDED
Status: DISCONTINUED | OUTPATIENT
Start: 2023-01-04 | End: 2023-01-04 | Stop reason: HOSPADM

## 2023-01-04 NOTE — DISCHARGE INSTRUCTIONS
Epidural Steroid Injection   WHAT YOU NEED TO KNOW:   An epidural steroid injection (DAREN) is a procedure to inject steroid medicine into the epidural space  The epidural space is between your spinal cord and vertebrae  Steroids reduce inflammation and fluid buildup in your spine that may be causing pain  You may be given pain medicine along with the steroids  ACTIVITY  Do not drive or operate machinery today  No strenuous activity today - bending, lifting, etc   You may resume normal activites starting tomorrow - start slowly and as tolerated  You may shower today, but no tub baths or hot tubs  You may have numbness for several hours from the local anesthetic  Please use caution and common sense, especially with weight-bearing activities  CARE OF THE INJECTION SITE  If you have soreness or pain, apply ice to the area today (20 minutes on/20 minutes off)  Starting tomorrow, you may use warm, moist heat or ice if needed  You may have an increase or change in your discomfort for 36-48 hours after your treatment  Apply ice and continue with any pain medication you have been prescribed  Notify the [facility] if you have any of the following: redness, drainage, swelling, headache, stiff neck or fever above 100°F     SPECIAL INSTRUCTIONS  Our office will contact you in approximately 7 days for a progress report  MEDICATIONS  Continue to take all routine medications  Our office may have instructed you to hold some medications  As no general anesthesia was used in today's procedure, you should not experience any side effects related to anesthesia  If you are diabetic, the steroids used in today's injection may temporarily increase your blood sugar levels after the first few days after your injection  Please keep a close eye on your sugars and alert the doctor who manages your diabetes if your sugars are significantly high from your baseline or you are symptomatic       If you have a problem specifically related to your procedure, please call our office at [phone]  Problems not related to your procedure should be directed to your primary care physician

## 2023-01-04 NOTE — OP NOTE
OPERATIVE REPORT  PATIENT NAME: Migel Cabrera    :  1940  MRN: [id number]  Pt Location: [place] [address] MINOR/PAIN ROOM 01    SURGERY DATE: 2023    Surgeon(s) and Role:     * Humberto Chacon DO - Primary    Preop Diagnosis:  Lumbar radiculopathy [M54 16]    Post-Op Diagnosis Codes:     * Lumbar radiculopathy [M54 16]    Procedure(s) (LRB):  BLOCK / INJECTION EPIDURAL STEROID LUMBAR L5-S1 (N/A)    Lumbar epidural  Indication:  Back and radiating leg pain  Preoperative diagnosis:  Lumbar radiculitis  Postoperative diagnosis:  Lumbar radiculitis    Procedure: Fluoroscopically-guided L5-S1 interlaminar epidural steroid injection under fluoroscopy    EBL:  none  Specimens:  not applicable    After discussing the risks, benefits, and alternatives to the procedure, the patient expressed understanding and wished to proceed  The patient was brought to the fluoroscopy suite and placed in the prone position  A procedural pause was conducted to verify:  correct patient identity, procedure to be performed and as applicable, correct side and site, correct patient position, and availability of implants, special equipment and special requirements  After identifying the L5-S1 space fluoroscopically, the skin was sterilely prepped and draped in the usual fashion using Chloraprep skin prep  The skin and subcutaneous tissues were anesthetized with 1% lidocaine  Utilizing a loss of resistance technique and intermittent fluoroscopic guidance, a 3 5 inch 20-gauge Tuohy needle was advanced into the epidural space  Proper needle positioning was confirmed using multiple fluoroscopic views  After negative aspiration, Omnipaque 240 contrast was injected confirming epidural spread without evidence of intravascular or intrathecal spread  A 4 ml solution consisting of 80 mg of Depo-Medrol in sterile saline was injected slowly and incrementally into the epidural space    Following the injection the needle was withdrawn slightly and flushed with 1% buffered lidocaine as it was fully extracted  The patient tolerated the procedure well and there were no apparent complications  After appropriate observation, the patient was dismissed from the clinic in good condition under their own power          SIGNATURE: Nasrin Ashley,   DATE: January 4, 2023  TIME: 11:26 AM

## 2023-01-04 NOTE — INTERVAL H&P NOTE
H&P reviewed  After examining the patient I find no changes in the patients condition since the H&P had been written      Vitals:    01/04/23 1101   BP: 153/95   Pulse: 77   Resp: 18   Temp: 97 6 °F (36 4 °C)   SpO2: 98%

## 2023-01-06 ENCOUNTER — OFFICE VISIT (OUTPATIENT)
Dept: FAMILY MEDICINE CLINIC | Facility: CLINIC | Age: 83
End: 2023-01-06

## 2023-01-06 VITALS
TEMPERATURE: 97.8 F | OXYGEN SATURATION: 99 % | WEIGHT: 164.8 LBS | SYSTOLIC BLOOD PRESSURE: 128 MMHG | BODY MASS INDEX: 28.13 KG/M2 | HEIGHT: 64 IN | DIASTOLIC BLOOD PRESSURE: 70 MMHG | HEART RATE: 78 BPM | RESPIRATION RATE: 18 BRPM

## 2023-01-06 DIAGNOSIS — I49.9 IRREGULAR HEART RHYTHM: ICD-10-CM

## 2023-01-06 DIAGNOSIS — F41.9 ANXIETY: ICD-10-CM

## 2023-01-06 DIAGNOSIS — H61.21 IMPACTED CERUMEN OF RIGHT EAR: Primary | ICD-10-CM

## 2023-01-06 DIAGNOSIS — I25.10 CORONARY ARTERY DISEASE INVOLVING NATIVE CORONARY ARTERY OF NATIVE HEART WITHOUT ANGINA PECTORIS: ICD-10-CM

## 2023-01-06 NOTE — PATIENT INSTRUCTIONS
Earwax Blockage   WHAT YOU NEED TO KNOW:   What is earwax blockage? Earwax can build up in your ear canal and cause a blockage  Earwax blockage happens when your ear makes earwax faster than your body can remove it  What causes earwax blockage? Narrow or abnormally shaped ear canals    Overgrowth of bone in your ear canal    Skin disease, such as eczema    Autoimmune disease, such as lupus    An injury that causes your body to make more earwax    Foreign objects in the ear    Using cotton swabs to clean your ears    Use of a hearing aid or ear plugs    What are the signs and symptoms of earwax blockage? Trouble hearing    Earache    Ear fullness or a feeling that something is plugging up your ear    Itching or ringing in your ear    Dizziness    How is earwax blockage treated? Medicines  placed in the ear canal can soften the earwax so it will come out  Flushing your ear canal  with warm water may flush out the earwax  Small medical tools  may be used to remove the earwax  How can I prevent earwax blockage? Do not stick anything into your ears to clean them  Use cotton swabs on the outside of your ear only  Ask your healthcare provider for more information on ways to prevent blockage  When should I seek immediate care? You feel dizzy  You have discharge or blood coming out of your ear  Your ear pain does not go away or gets worse  When should I call my doctor? You have a fever  You have trouble hearing or hear ringing noises  You have questions or concerns about your condition or care  CARE AGREEMENT:   You have the right to help plan your care  Learn about your health condition and how it may be treated  Discuss treatment options with your healthcare providers to decide what care you want to receive  You always have the right to refuse treatment  The above information is an  only   It is not intended as medical advice for individual conditions or treatments  Talk to your doctor, nurse or pharmacist before following any medical regimen to see if it is safe and effective for you  © Copyright [employer] [facility] 2022 Information is for End User's use only and may not be sold, redistributed or otherwise used for commercial purposes   All illustrations and images included in CareNotes® are the copyrighted property of [employer] Inc  or [address] [place], [place] [address]kyle

## 2023-01-06 NOTE — PROGRESS NOTES
Assessment/Plan:    1  Impacted cerumen of right ear  -     Ear cerumen removal    2  Irregular heart rhythm  Comments:  EKG done in office and no ischemic changes noted but rythm is irregular with frequent PAC's almost bigeminy presentation and Dr Ridge Sue agreed and will follow cardiologist next week as currently does not have any symptoms and advised to go to ER for any concerns  Vitals stable  Orders:  -     POCT ECG    3  Coronary artery disease involving native coronary artery of native heart without angina pectoris  Assessment & Plan:  Managed by cardiologist and on lipitor 80 mg      4  Anxiety  Assessment & Plan:  Stable with current regimen        Patient Instructions:  Supportive care discussed and advised  Advised to RTO for any worsening and no improvement  Follow up for no improvement and worsening of conditions  Patient advised and educated when to see immediate medical care  Return if symptoms worsen or fail to improve  Future Appointments   Date Time Provider Patricia Beltrán   2/13/2023 10:45 AM EVELYN Zapata [facility]   6/14/2023  1:00 PM Stalin Fuentes DO [facility]           Subjective:      Patient ID: Ike Mayo is a [de-identified] y o  male  Chief Complaint   Patient presents with   • right ear clogged     Patient is here today for his right ear feeling clogged, L [place]/LPN         Vitals:  /70   Pulse 78   Temp 97 8 °F (36 6 °C) (Temporal)   Resp 18   Ht 5' 4" (1 626 m)   Wt 74 8 kg (164 lb 12 8 oz)   SpO2 99%   BMI 28 29 kg/m²     HPI  Patient stated that having feeling of right ear being clogged and gets wax in that area ususally  Denies fever, chills and sob  On ausculation heart rate was irregular    Complaint with medications for chronic illnesses          PHQ-2/9 Depression Screening    Little interest or pleasure in doing things: 1 - several days  Feeling down, depressed, or hopeless: 1 - several days             The following portions of the patient's history were reviewed and updated as appropriate: allergies, current medications, past family history, past medical history, past social history, past surgical history and problem list       Review of Systems   Constitutional: Negative for chills, diaphoresis, fatigue, fever and unexpected weight change  HENT: Positive for ear pain  Negative for congestion, dental problem, drooling, ear discharge, facial swelling, hearing loss, mouth sores, nosebleeds, postnasal drip, rhinorrhea, sinus pressure, sinus pain, sneezing, sore throat, tinnitus, trouble swallowing and voice change  As noted in HPI     Respiratory: Negative for cough, chest tightness, shortness of breath and wheezing  Cardiovascular: Negative  Gastrointestinal: Negative for abdominal pain, constipation, diarrhea, nausea and vomiting  Skin: Negative  Neurological: Negative for dizziness, light-headedness and headaches  Objective:    Social History     Tobacco Use   Smoking Status Former   Smokeless Tobacco Never       Allergies: No Known Allergies      Current Outpatient Medications   Medication Sig Dispense Refill   • ASPIRIN 81 PO Take by mouth     • atorvastatin (LIPITOR) 80 mg tablet Take 1 tablet (80 mg total) by mouth daily 90 tablet 1   • gabapentin (NEURONTIN) 100 mg capsule Take 1 capsule (100 mg total) by mouth daily at bedtime for 5 days, THEN 1 capsule (100 mg total) 2 (two) times a day for 5 days, THEN 1 capsule (100 mg total) 3 (three) times a day for 20 days  75 capsule 0   • LOSARTAN POTASSIUM PO Take by mouth     • Multiple Vitamins-Minerals (PRESERVISION AREDS 2 PO) Take by mouth     • nitroglycerin (NITROLINGUAL) 0 4 mg/spray spray Place 1 spray under the tongue every 5 (five) minutes as needed for chest pain 4 9 g 1   • sertraline (ZOLOFT) 50 mg tablet Take 1 5 tablets (75 mg total) by mouth daily 135 tablet 1     No current facility-administered medications for this visit  Physical Exam  Vitals reviewed  Constitutional:       Appearance: Normal appearance  He is well-developed  HENT:      Head: Normocephalic  Comments: Right ear with cerumen impaction and irrigation done     Right Ear: Tympanic membrane, ear canal and external ear normal       Left Ear: Tympanic membrane, ear canal and external ear normal       Nose: Nose normal       Right Sinus: No maxillary sinus tenderness or frontal sinus tenderness  Left Sinus: No maxillary sinus tenderness or frontal sinus tenderness  Mouth/Throat:      Mouth: No oral lesions  Pharynx: No oropharyngeal exudate or posterior oropharyngeal erythema  Cardiovascular:      Rate and Rhythm: Normal rate  Rhythm irregular  Heart sounds: Normal heart sounds  Pulmonary:      Effort: Pulmonary effort is normal       Breath sounds: Normal breath sounds  Musculoskeletal:         General: Normal range of motion  Cervical back: Neck supple  Lymphadenopathy:      Cervical:      Right cervical: No superficial or posterior cervical adenopathy  Left cervical: No superficial or posterior cervical adenopathy  Skin:     General: Skin is warm and dry  Neurological:      Mental Status: He is alert and oriented to person, place, and time  Psychiatric:         Behavior: Behavior normal          Thought Content: Thought content normal          Judgment: Judgment normal                  Ear cerumen removal    Date/Time: 1/6/2023 2:28 PM  Performed by: EVELYN William  Authorized by: EVELYN William   Universal Protocol:  Consent: Verbal consent obtained  Written consent not obtained  Risks and benefits: risks, benefits and alternatives were discussed  Consent given by: patient  Time out: Immediately prior to procedure a "time out" was called to verify the correct patient, procedure, equipment, support staff and site/side marked as required    Timeout called at: 1/6/2023 2:28 PM   Patient understanding: patient states understanding of the procedure being performed  Patient consent: the patient's understanding of the procedure matches consent given  Site marked: the operative site was marked  Patient identity confirmed: verbally with patient      Patient location:  Clinic  Indications / Diagnosis:  Right ear cerumen impaction  Procedure details:     Location:  R ear    Procedure type: irrigation with instrumentation      Instrumentation: curette      Approach:  External    Visualization (free text):  Otoscope  Post-procedure details:     Complication:  None    Hearing quality:  Normal    Patient tolerance of procedure:   Tolerated well, no immediate complications        [place] EVELYN Barker

## 2023-01-09 ENCOUNTER — OFFICE VISIT (OUTPATIENT)
Dept: CARDIOLOGY CLINIC | Facility: CLINIC | Age: 83
End: 2023-01-09

## 2023-01-09 VITALS
OXYGEN SATURATION: 98 % | SYSTOLIC BLOOD PRESSURE: 146 MMHG | HEART RATE: 72 BPM | TEMPERATURE: 98.2 F | WEIGHT: 160 LBS | BODY MASS INDEX: 27.31 KG/M2 | HEIGHT: 64 IN | DIASTOLIC BLOOD PRESSURE: 88 MMHG

## 2023-01-09 DIAGNOSIS — I45.2 BIFASCICULAR BLOCK: ICD-10-CM

## 2023-01-09 DIAGNOSIS — I10 ESSENTIAL HYPERTENSION: ICD-10-CM

## 2023-01-09 DIAGNOSIS — E78.2 MIXED HYPERLIPIDEMIA: ICD-10-CM

## 2023-01-09 DIAGNOSIS — F41.9 ANXIETY: ICD-10-CM

## 2023-01-09 DIAGNOSIS — I25.10 CORONARY ARTERY DISEASE INVOLVING NATIVE CORONARY ARTERY OF NATIVE HEART WITHOUT ANGINA PECTORIS: ICD-10-CM

## 2023-01-09 DIAGNOSIS — I49.1 PREMATURE ATRIAL CONTRACTIONS: ICD-10-CM

## 2023-01-09 DIAGNOSIS — Z87.898 H/O BRAIN TUMOR: ICD-10-CM

## 2023-01-09 RX ORDER — AMLODIPINE BESYLATE 2.5 MG/1
2.5 TABLET ORAL DAILY
Qty: 90 TABLET | Refills: 1 | Status: SHIPPED | OUTPATIENT
Start: 2023-01-09

## 2023-01-09 NOTE — PROGRESS NOTES
Progress note- Cardiology Office   [facility]  Paul Lakhani [de-identified] y o  male MRN: [id number]  : 1940  Unit/Bed#:  Encounter: [id number]      Assessment:     1  Coronary artery disease involving native coronary artery of native heart without angina pectoris    2  Essential hypertension    3  Mixed hyperlipidemia    4  Bifascicular block    5  H/O brain tumor    6  Anxiety    7  Premature atrial contractions        Discussion summary and Plan:    1  Coronary artery disease status post coronary bypass surgery with 2 vessel bypass in  [place]  [facility] details not available will try to get records  He had a cardiac workup done in the form of stress test and echo Doppler in the last 1 year with cardiologist there  Those tests reviewed with him  His stress test was in  echo was in      2  Essential hypertension  Patient blood pressure has been elevated  We do not have doses of his losartan will add amlodipine 2 5 mg daily  Drinking excessive caffeine can also raise blood pressure  Discussed with patient and he understand very well lifestyle modification strongly recommended cut back on salt and stimulants  3  Dyslipidemia with good statin he is on high intensity statin continue same medication  Triglycerides slightly elevated patient is well aware  See medications    4  Bifascicular block  Patient has history of bifascicular block  He also LVH echo shows normal LV systolic function we will schedule him for Holter    5  History of anxiety is the primary care provider for her wife  He is under a lot of stress he drinks caffeine advised him to cut back on his caffeine intake  6  History of brain tumor which was benign has recovered well  7   Abnormal EKG with few PACs I could not see that EKG  We did multiple attempts  Lifestyle modification recommended patient will be scheduled for Holter monitor        Patient / Ava Else was advised and educated to call our office  immediately if  patient has any new symptoms of chest pain/shortness of breath, near-syncope, syncope, light headedness sustained palpitations or any other cardiovascular symptoms before their scheduled follow-up appointment  Office number was provided #[phone]  Thank you for your consultation  If you have any question please call me at [phone]    Counseling :  A description of the counseling  Goals and Barriers  Patient's ability to self care: Yes  Medication side effect reviewed with patient in detail and all their questions answered to their satisfaction  Primary Care Physician: Cachorro Mcmillan DO     HPI:     Bruna Logan is a [de-identified]y o  year old male who was referred by primary care doctor for for multiple cardiac risk factors  Patient has medical history significant for coronary artery disease status post 2 vessel bypass in 1989 in [facility], dyslipidemia, hypertension, bifascicular block, history of benign brain tumor who has moved to this area from [place] and came to see us  He used to follow with cardiologist there and has regular cardiac care  He believes he had a echo and stress test done within a year reports of which will be obtained from them  His EKG shows bifascicular block with LVH with repolarization abnormality we do not have old EKG to compare but patient was aware of it  He used to smoke he has quit smoking  He had a family history of heart disease  He moved to this area as his wife now having issues and he want to close his family  He has recently blood test done in September 2022 which has been acceptable  No nausea no vomiting no issues related to cardiac problem he is having issue with his back problem and is currently in pain  No other cardiovascular problem at this time  He smokes about pack a day for about 12 years he quit smoking wave before his surgery  No other recent surgery  He used to walk a lot    Recently not walking that much due to back problems  Patient additional records from his previous cardiologist reviewed  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, history of transitional CSL of bladder which resolved, communicate hydro saphenous, and history of mild aortic stenosis  1/9/2023  Above reviewed  Patient came for follow-up  He has medical history significant for coronary artery disease s/p two-vessel bypass in 1989, dyslipidemia, hypertension, bifascicular block, history of benign brain tumor and back problems who came for follow-up  He is doing well from heart point of view but his blood pressure has been up and down  He used to smoke he has quit smoking  He has a cardiac work-up done before he moved to this area which has been acceptable which include echo as well as nuclear stress test since 2019  He smoked about 12 years since quit smoking for his surgery  No nausea no vomiting no fever no chills  He had a history of cerebellar hemangioblastoma s/p surgery in 1999 and history of transitional cell tumor of the bladder  History of mild aortic stenosis and cardiac murmur  No other issues at this time     Patient has EKG done at primary care doctor's office which was read as sinus with few PACs I could not see those EKG  Review of Systems   Constitutional: Negative for activity change, chills, diaphoresis, fever and unexpected weight change  HENT: Negative for congestion  Eyes: Negative for discharge and redness  Respiratory: Negative for cough, chest tightness, shortness of breath and wheezing  Cardiovascular: Negative  Negative for chest pain, palpitations and leg swelling  Gastrointestinal: Negative for abdominal pain, diarrhea and nausea  Endocrine: Negative  Genitourinary: Negative for decreased urine volume and urgency  Musculoskeletal: Positive for arthralgias and back pain  Negative for gait problem  Skin: Negative for rash and wound  Allergic/Immunologic: Negative  Neurological: Negative for dizziness, seizures, syncope, weakness, light-headedness and headaches  Hematological: Negative  Psychiatric/Behavioral: Negative for agitation and confusion  The patient is nervous/anxious  Historical Information   History reviewed  No pertinent past medical history  Past Surgical History:   Procedure Laterality Date   • BRAIN SURGERY     • CORONARY ARTERY BYPASS GRAFT     • EPIDURAL BLOCK INJECTION N/A 1/4/2023    Procedure: BLOCK / INJECTION EPIDURAL STEROID LUMBAR L5-S1;  Surgeon: Evelia Curtis DO;  Location: [place] [address] MAIN OR;  Service: Pain Management    • LUMBAR EPIDURAL INJECTION N/A 11/9/2022    Procedure: L5 S1 LUMBAR epidural steroid injection ( [address]5947);   Surgeon: Evelia Curtis DO;  [facility] MAIN OR;  Service: Pain Management    • URINARY SURGERY       Social History     Substance and Sexual Activity   Alcohol Use Yes   • Alcohol/week: 14 0 standard drinks   • Types: 14 Shots of liquor per week    Comment: 2 drinks of gin daily     Social History     Substance and Sexual Activity   Drug Use Never     Social History     Tobacco Use   Smoking Status Former   Smokeless Tobacco Never     Family History:   Family History   Problem Relation Age of Onset   • Alzheimer's disease Mother    • Heart disease Father    • Mental illness Neg Hx        Meds/Allergies     No Known Allergies    Current Outpatient Medications:   •  amLODIPine (NORVASC) 2 5 mg tablet, Take 1 tablet (2 5 mg total) by mouth daily, Disp: 90 tablet, Rfl: 1  •  ASPIRIN 81 PO, Take by mouth, Disp: , Rfl:   •  atorvastatin (LIPITOR) 80 mg tablet, Take 1 tablet (80 mg total) by mouth daily, Disp: 90 tablet, Rfl: 1  •  LOSARTAN POTASSIUM PO, Take by mouth, Disp: , Rfl:   •  Multiple Vitamins-Minerals (PRESERVISION AREDS 2 PO), Take by mouth, Disp: , Rfl:   •  nitroglycerin (NITROLINGUAL) 0 4 mg/spray spray, Place 1 spray under the tongue every 5 (five) minutes as needed for chest pain, Disp: 4 9 g, Rfl: 1  •  sertraline (ZOLOFT) 50 mg tablet, Take 1 5 tablets (75 mg total) by mouth daily, Disp: 135 tablet, Rfl: 1  •  gabapentin (NEURONTIN) 100 mg capsule, Take 1 capsule (100 mg total) by mouth daily at bedtime for 5 days, THEN 1 capsule (100 mg total) 2 (two) times a day for 5 days, THEN 1 capsule (100 mg total) 3 (three) times a day for 20 days  (Patient not taking: Reported on 1/9/2023), Disp: 75 capsule, Rfl: 0    Vitals: Blood pressure 146/88, pulse 72, temperature 98 2 °F (36 8 °C), height 5' 4" (1 626 m), weight 72 6 kg (160 lb), SpO2 98 %  ?  Body mass index is 27 46 kg/m²  Wt Readings from Last 3 Encounters:   01/09/23 72 6 kg (160 lb)   01/06/23 74 8 kg (164 lb 12 8 oz)   12/13/22 73 9 kg (163 lb)     Vitals:    01/09/23 1501   Weight: 72 6 kg (160 lb)     BP Readings from Last 3 Encounters:   01/09/23 146/88   01/06/23 128/70   01/04/23 (!) 173/97         Physical Exam  Constitutional:       General: He is not in acute distress  Appearance: He is well-developed  He is not diaphoretic  Neck:      Thyroid: No thyromegaly  Vascular: No JVD  Trachea: No tracheal deviation  Cardiovascular:      Rate and Rhythm: Normal rate and regular rhythm  Heart sounds: S1 normal and S2 normal  Heart sounds not distant  Murmur heard  Systolic (ejection) murmur is present with a grade of 2/6  No friction rub  No gallop  No S3 or S4 sounds  Pulmonary:      Effort: Pulmonary effort is normal  No respiratory distress  Breath sounds: Normal breath sounds  No wheezing or rales  Chest:      Chest wall: No tenderness  Abdominal:      General: Bowel sounds are normal  There is no distension  Palpations: Abdomen is soft  Tenderness: There is no abdominal tenderness  Musculoskeletal:         General: No deformity  Cervical back: Neck supple  Skin:     General: Skin is warm and dry  Coloration: Skin is not pale  Findings: No rash  Neurological:      Mental Status: He is alert and oriented to person, place, and time  Psychiatric:         Behavior: Behavior normal          Judgment: Judgment normal              Diagnostic Studies Review Cardio:    Echo Doppler done with his cardiologist on January 2022 shows EF is 50-55 percent, mild aortic stenosis, left ventricle normal size with hypokinesis of inferior wall, mild mitral regurgitation, mild AI mild AS and aortic root was mildly dilated but ascending aorta was normal in size  He has a normal Lexiscan on 07/02/2019  EKG:    Twelve lead EKG 09/28/2022 shows normal sinus rhythm RBBB with left anterior fascicular block  LVH with repolarization abnormality  Patient has bifascicular block  Patient is aware of it    EKG from his cardiologist done shows no significant change few PACs were noted  Lab Review   Lab Results   Component Value Date    WBC 9 94 09/27/2022    HGB 14 5 09/27/2022    HCT 43 8 09/27/2022    MCV 99 (H) 09/27/2022    RDW 14 7 09/27/2022     09/27/2022     BMP:  Lab Results   Component Value Date    SODIUM 138 09/27/2022    K 4 1 09/27/2022     09/27/2022    CO2 31 09/27/2022    BUN 19 09/27/2022    CREATININE 1 08 09/27/2022    GLUF 83 09/27/2022    CALCIUM 8 5 09/27/2022    CORRECTEDCA 9 0 09/27/2022    EGFR 64 09/27/2022     Troponins:    LFT:  Lab Results   Component Value Date    AST 20 09/27/2022    ALT 32 09/27/2022    ALKPHOS 49 09/27/2022    TP 6 4 09/27/2022    ALB 3 4 (L) 09/27/2022      Lipid Profile:   Lab Results   Component Value Date    CHOLESTEROL 184 09/27/2022    HDL 84 09/27/2022    LDLCALC 67 09/27/2022    TRIG 166 (H) 09/27/2022     Lab Results   Component Value Date    CHOLESTEROL 184 09/27/2022         Dr Kathrin Bullock MD [facility]      "This note was completed in part utilizing m-modal fluency direct voice recognition software     Grammatical errors, random word insertion, spelling mistakes, and incomplete sentences may be an occasional consequence of the system secondary to software limitations, ambient noise and hardware issues  Please read the chart carefully and recognize, using context, where substitutions have occurred    If you have any questions or concerns about the context, text or information contained within the body of this dictation, please contact myself, the provider, for further clarification "

## 2023-01-11 ENCOUNTER — TELEPHONE (OUTPATIENT)
Dept: PAIN MEDICINE | Facility: CLINIC | Age: 83
End: 2023-01-11

## 2023-01-11 DIAGNOSIS — M48.062 SPINAL STENOSIS OF LUMBAR REGION WITH NEUROGENIC CLAUDICATION: Primary | ICD-10-CM

## 2023-01-11 NOTE — TELEPHONE ENCOUNTER
Caller: Kristi Cespedes    Doctor: Jaret Sterling    Reason for call: Patient is asking for a referral to Neurosurgery    Call back#: [phone]

## 2023-01-11 NOTE — TELEPHONE ENCOUNTER
Caller: patient    Doctor: Shirley Castellano    Reason for call: pain level 8  No improvement  Call back#:

## 2023-01-18 ENCOUNTER — HOSPITAL ENCOUNTER (OUTPATIENT)
Dept: NON INVASIVE DIAGNOSTICS | Facility: HOSPITAL | Age: 83
Discharge: HOME | End: 2023-01-18
Attending: INTERNAL MEDICINE

## 2023-01-18 DIAGNOSIS — Z87.898 H/O BRAIN TUMOR: ICD-10-CM

## 2023-01-18 DIAGNOSIS — E78.2 MIXED HYPERLIPIDEMIA: ICD-10-CM

## 2023-01-18 DIAGNOSIS — I49.1 PREMATURE ATRIAL CONTRACTIONS: ICD-10-CM

## 2023-01-18 DIAGNOSIS — I10 ESSENTIAL HYPERTENSION: ICD-10-CM

## 2023-01-18 DIAGNOSIS — I45.2 BIFASCICULAR BLOCK: ICD-10-CM

## 2023-01-18 DIAGNOSIS — I25.10 CORONARY ARTERY DISEASE INVOLVING NATIVE CORONARY ARTERY OF NATIVE HEART WITHOUT ANGINA PECTORIS: ICD-10-CM

## 2023-01-18 DIAGNOSIS — F41.9 ANXIETY: ICD-10-CM

## 2023-01-27 ENCOUNTER — TELEPHONE (OUTPATIENT)
Dept: OBGYN CLINIC | Facility: HOSPITAL | Age: 83
End: 2023-01-27

## 2023-01-27 NOTE — TELEPHONE ENCOUNTER
Caller: Kelechi Del Rosario    Doctor: n/a    Reason for call: patient needed Medical Records, transferred call    Call back#: [phone]

## 2023-01-31 ENCOUNTER — TELEPHONE (OUTPATIENT)
Dept: CARDIOLOGY CLINIC | Facility: CLINIC | Age: 83
End: 2023-01-31

## 2023-01-31 DIAGNOSIS — R00.2 PALPITATIONS: ICD-10-CM

## 2023-01-31 DIAGNOSIS — I25.10 CORONARY ARTERY DISEASE INVOLVING NATIVE CORONARY ARTERY OF NATIVE HEART WITHOUT ANGINA PECTORIS: Primary | ICD-10-CM

## 2023-01-31 RX ORDER — METOPROLOL SUCCINATE 25 MG/1
25 TABLET, EXTENDED RELEASE ORAL DAILY
Qty: 90 TABLET | Refills: 1 | Status: SHIPPED | OUTPATIENT
Start: 2023-01-31 | End: 2023-07-13

## 2023-01-31 RX ORDER — METOPROLOL SUCCINATE 25 MG/1
25 TABLET, EXTENDED RELEASE ORAL DAILY
Qty: 30 TABLET | Refills: 5 | Status: CANCELLED | OUTPATIENT
Start: 2023-01-31

## 2023-01-31 NOTE — TELEPHONE ENCOUNTER
----- Message from Joleen Cockayne, MD sent at 1/31/2023  8:36 AM EST -----  Patient's Holter monitor is abnormal he is having frequent PACs there were 23% of the total beat average heart rate was acceptable  I would like to start him on Toprol-XL 25 mg daily  If his blood pressure is low we can hold off on amlodipine

## 2023-01-31 NOTE — TELEPHONE ENCOUNTER
SW patient, made aware of results  He is willing to initiate toprol therapy, and I pended order to local pharmacy     He will begin taking bp at home and monitor for low bp

## 2023-02-16 ENCOUNTER — TELEPHONE (OUTPATIENT)
Dept: CARDIOLOGY CLINIC | Facility: CLINIC | Age: 83
End: 2023-02-16

## 2023-02-16 NOTE — TELEPHONE ENCOUNTER
Patient was seen by you on  1/9/23   He will be having spinal surg at [address] [place] [address] on 4/5/23 with Dr Anshu Milse (office number: [phone]

## 2023-02-20 NOTE — TELEPHONE ENCOUNTER
Pre  Op  Clearance note- Cardiology    Gisselle Ariza   [de-identified] y o   male  1940      Dr Liang Lopez - [phone]    Roxana Edmond Lakhani :     Patient's chart was reviewed for preop clearance  Patient was seen in our office on 01/09/2023  Patient has past medical history significant for CAD with a two-vessel bypass, HTN, mixed hyperlipidemia, bifascicular block, PAC's  Patient is now scheduled for L2-3, L3-4, L4-5 Laminectomy 04/05/2023  He has history of low normal EF 50 to 55% with mild valvular disease      Patient has no clinical evidence of  active heart failure or  active ischemia or active arrhythmia  Patient's last cardiac workup including stress test done in 2019 and echo Doppler done in 2021 with patient's cardiologist reports were reviewed and it shows normal LV systolic function no significant ischemia and patient has been asymptomatic  Eligio Syed In my opinion patient is in optimum condition for the procedure as planned  Patient is intermediate for the surgery as planned from cardiac point of view  Continue current cardiac medications  Patient has echo Doppler done also in January 2022 with his doctor's office where EF was noted to 50 to 55% and he has evidence of inferior septal hypokinesis  It also showed mild valvular disease  Patient can hold  Aspirin for  5-7 days as required for surgery  Please restart after the procedure  immediately or next day if no contraindication form surgical point of view and advise patient to contact our office  If you have any question please do not hesitate to call us at our office of [facility]    Phone # [phone]        Lab Results   Component Value Date    WBC 9 94 09/27/2022    HGB 14 5 09/27/2022    HCT 43 8 09/27/2022    MCV 99 (H) 09/27/2022     09/27/2022     Lab Results   Component Value Date    CREATININE 1 08 09/27/2022     Lab Results   Component Value Date    GLUF 83 09/27/2022             DR Estrada Evelyn Velasco MD [facility]  2/20/2023  9:53 AM      "This note was completed in part utilizing m-modal fluency direct voice recognition software  Grammatical errors, random word insertion, spelling mistakes, and incomplete sentences may be an occasional consequence of the system secondary to software limitations, ambient noise and hardware issues  Please read the chart carefully and recognize, using context, where substitutions have occurred    If you have any questions or concerns about the context, text or information contained within the body of this dictation, please contact myself, the provider, for further clarification "

## 2023-03-12 DIAGNOSIS — E78.2 MIXED HYPERLIPIDEMIA: ICD-10-CM

## 2023-03-12 DIAGNOSIS — I25.10 CORONARY ARTERY DISEASE INVOLVING NATIVE CORONARY ARTERY OF NATIVE HEART WITHOUT ANGINA PECTORIS: ICD-10-CM

## 2023-03-13 RX ORDER — ATORVASTATIN CALCIUM 80 MG/1
TABLET, FILM COATED ORAL
Qty: 90 TABLET | Refills: 1 | Status: SHIPPED | OUTPATIENT
Start: 2023-03-13

## 2023-03-21 ENCOUNTER — OFFICE VISIT (OUTPATIENT)
Dept: FAMILY MEDICINE CLINIC | Facility: CLINIC | Age: 83
End: 2023-03-21

## 2023-03-21 VITALS
WEIGHT: 165 LBS | HEIGHT: 64 IN | DIASTOLIC BLOOD PRESSURE: 76 MMHG | TEMPERATURE: 97.3 F | HEART RATE: 68 BPM | RESPIRATION RATE: 18 BRPM | SYSTOLIC BLOOD PRESSURE: 136 MMHG | BODY MASS INDEX: 28.17 KG/M2

## 2023-03-21 DIAGNOSIS — I77.810 AORTIC ROOT DILATATION (HCC): ICD-10-CM

## 2023-03-21 DIAGNOSIS — M48.062 SPINAL STENOSIS OF LUMBAR REGION WITH NEUROGENIC CLAUDICATION: Primary | ICD-10-CM

## 2023-03-21 DIAGNOSIS — Z01.818 PRE-OP EXAMINATION: ICD-10-CM

## 2023-03-21 DIAGNOSIS — Z86.61 HISTORY OF BACTERIAL MENINGITIS: ICD-10-CM

## 2023-03-21 DIAGNOSIS — Z95.1 H/O CORONARY ARTERY BYPASS SURGERY: ICD-10-CM

## 2023-03-21 DIAGNOSIS — F32.0 MAJOR DEPRESSIVE DISORDER, SINGLE EPISODE, MILD (HCC): ICD-10-CM

## 2023-03-21 DIAGNOSIS — I25.10 CORONARY ARTERY DISEASE INVOLVING NATIVE CORONARY ARTERY OF NATIVE HEART WITHOUT ANGINA PECTORIS: ICD-10-CM

## 2023-03-21 DIAGNOSIS — I10 ESSENTIAL HYPERTENSION: ICD-10-CM

## 2023-03-21 PROBLEM — M19.021 PRIMARY OSTEOARTHRITIS OF RIGHT ELBOW: Status: ACTIVE | Noted: 2021-08-23

## 2023-03-21 PROBLEM — I35.0 NONRHEUMATIC AORTIC VALVE STENOSIS: Status: ACTIVE | Noted: 2020-12-31

## 2023-03-21 PROBLEM — M50.30 DDD (DEGENERATIVE DISC DISEASE), CERVICAL: Status: ACTIVE | Noted: 2021-08-23

## 2023-03-21 PROBLEM — R01.1 HEART MURMUR: Status: ACTIVE | Noted: 2018-05-15

## 2023-03-21 PROBLEM — M47.812 FACET ARTHRITIS, DEGENERATIVE, CERVICAL SPINE: Status: ACTIVE | Noted: 2021-08-23

## 2023-03-21 RX ORDER — GABAPENTIN 100 MG/1
CAPSULE ORAL DAILY
COMMUNITY
Start: 2022-12-07 | End: 2023-03-21

## 2023-03-21 RX ORDER — NIACIN 500 MG
TABLET ORAL 2 TIMES DAILY
COMMUNITY

## 2023-03-21 RX ORDER — OMEPRAZOLE 20 MG/1
TABLET, DELAYED RELEASE ORAL DAILY PRN
COMMUNITY

## 2023-03-21 NOTE — PROGRESS NOTES
FAMILY PRACTICE PRE-OPERATIVE EVALUATION  [facility]    NAME: Paul Lakhani  AGE: [de-identified] y o  SEX: male  : 1940     DATE: 3/24/2023    Family Practice Pre-Operative Evaluation      Chief Complaint: Pre-operative Evaluation     Surgery: Spine surgery - Laminectomy  Anticipated Date of Surgery: 23  Surgeon: Dr Dolores Huerta      History of Present Illness:     Pt is here for evaluation of chronic conditions prior to surgery  Was cleared by cardio  Pt did not get labs for pre op      Anesthesia:  general  Bleeding Risk: no recent abnormal bleeding, no remote history of abnormal bleeding and use of Ca-channel blockers (see med list)  Current Anti-platelet/anticoagulation medication: Aspirin and will stop 7 days prior to surgery    Assessment of Cardiac Risk:  · Denies unstable or severe angina or MI in the last 6 weeks or history of stent placement in the last year   · Denies decompensated heart failure (e g  New onset heart failure, NYHA functional class IV heart failure, or worsening existing heart failure)  · Denies significant arrhythmias such as high grade AV block, symptomatic ventricular arrhythmia, newly recognized ventricular tachycardia, supraventricular tachycardia with resting heart rate >100, or symptomatic bradycardia  · Denies severe heart valve disease including aortic stenosis or symptomatic mitral stenosis     Exercise Capacity:  · Able to walk 4 blocks without symptoms?: Yes  · Able to walk 2 flights without symptoms?: Yes    Prior Anesthesia Reactions: No     Personal history of venous thromboembolic disease? No    History of steroid use for >2 weeks within last year? No         Review of Systems:     Review of Systems   Constitutional: Negative for activity change, appetite change, chills, diaphoresis, fatigue, fever and unexpected weight change     HENT: Negative for congestion, dental problem, ear pain, mouth sores, sinus pressure, sinus pain, sore throat and trouble swallowing  Eyes: Negative for photophobia, discharge and itching  Respiratory: Negative for apnea, chest tightness and shortness of breath  Cardiovascular: Negative for chest pain, palpitations and leg swelling  Gastrointestinal: Negative for abdominal distention, abdominal pain, blood in stool, nausea and vomiting  Endocrine: Negative for cold intolerance, heat intolerance, polydipsia, polyphagia and polyuria  Genitourinary: Negative for difficulty urinating  Musculoskeletal: Positive for back pain and myalgias  Negative for arthralgias  Skin: Negative for color change and wound  Neurological: Positive for numbness  Negative for dizziness, syncope, speech difficulty and headaches  Hematological: Negative for adenopathy  Psychiatric/Behavioral: Negative for agitation and behavioral problems  Current Problem List:     Patient Active Problem List   Diagnosis   • Anxiety   • Essential hypertension   • Mixed hyperlipidemia   • H/O brain tumor   • S/P CABG x 2   • Coronary artery disease involving native coronary artery of native heart without angina pectoris   • Spinal stenosis of lumbar region with neurogenic claudication   • Age-related osteoporosis without current pathological fracture   • Benign prostatic hyperplasia with lower urinary tract symptoms   • DDD (degenerative disc disease), cervical   • ED (erectile dysfunction)   • Facet arthritis, degenerative, cervical spine   • Heart murmur   • Hemangioblastoma   • Hx of bladder cancer   • Major depressive disorder, single episode, mild (HCC)   • Myocardial infarction ([facility] )   • Nonrheumatic aortic valve stenosis   • Primary osteoarthritis of right elbow   • History of bacterial meningitis   • Aortic root dilatation ([facility])       Allergies:      Allergies   Allergen Reactions   • Sulfa Antibiotics Irritability   • Erythromycin Other (See Comments)   • Sulfasalazine Other (See Comments)       Current Medications:       Current Outpatient Medications:   •  amLODIPine (NORVASC) 2 5 mg tablet, Take 1 tablet (2 5 mg total) by mouth daily, Disp: 90 tablet, Rfl: 1  •  ASPIRIN 81 PO, Take by mouth, Disp: , Rfl:   •  atorvastatin (LIPITOR) 80 mg tablet, TAKE ONE TABLET BY MOUTH EVERY DAY, Disp: 90 tablet, Rfl: 1  •  Cholecalciferol 50 MCG (2000 UT) TABS, Take 1,000 Units by mouth daily, Disp: , Rfl:   •  Loperamide HCl (IMODIUM PO), Take 0 5 tablets by mouth as needed, Disp: , Rfl:   •  LOSARTAN POTASSIUM PO, Take by mouth, Disp: , Rfl:   •  metoprolol succinate (TOPROL-XL) 25 mg 24 hr tablet, Take 1 tablet (25 mg total) by mouth daily, Disp: 90 tablet, Rfl: 1  •  Multiple Vitamins-Minerals (PRESERVISION AREDS 2 PO), Take by mouth, Disp: , Rfl:   •  niacin 500 mg tablet, 2 (two) times a day, Disp: , Rfl:   •  nitroglycerin (NITROLINGUAL) 0 4 mg/spray spray, Place 1 spray under the tongue every 5 (five) minutes as needed for chest pain, Disp: 4 9 g, Rfl: 1  •  omeprazole (PriLOSEC OTC) 20 MG tablet, Take by mouth Once daily as needed, Disp: , Rfl:   •  sertraline (ZOLOFT) 50 mg tablet, Take 1 5 tablets (75 mg total) by mouth daily, Disp: 135 tablet, Rfl: 1    Past Medical History:       Past Medical History:   Diagnosis Date   • 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    Formatting of this note might be different from the original  Overview:  s/p ventriculostomy 1/12/99 1/23 lm   • Trans[facility] ) 4/22/2009        Past Surgical History:   Procedure Laterality Date   • BRAIN SURGERY     • CORONARY ARTERY BYPASS GRAFT     • EPIDURAL BLOCK INJECTION N/A 1/4/2023    Procedure: BLOCK / INJECTION EPIDURAL STEROID LUMBAR L5-S1;  Surgeon: Paul Sue DO;  Location: [facility] MAIN OR;  Service: Pain Management    • LUMBAR EPIDURAL INJECTION N/A 11/9/2022    Procedure: L5 S1 LUMBAR epidural steroid injection ( 32784);   Surgeon: Paul Sue DO;  Location: [facility] MAIN OR;  Service: Pain Management    • URINARY SURGERY          Family History   Problem Relation Age of Onset   • Alzheimer's disease Mother    • Heart disease Father    • Mental illness Neg Hx         Social History     Socioeconomic History   • Marital status: /Civil Union     Spouse name: Not on file   • Number of children: Not on file   • Years of education: Not on file   • Highest education level: Not on file   Occupational History   • Not on file   Tobacco Use   • Smoking status: Former   • Smokeless tobacco: Never   Vaping Use   • Vaping Use: Never used   Substance and Sexual Activity   • Alcohol use: Yes     Alcohol/week: 14 0 standard drinks     Types: 14 Shots of liquor per week     Comment: 2 drinks of gin daily   • Drug use: Never   • Sexual activity: Not on file   Other Topics Concern   • Not on file   Social History Narrative   • Not on file     Social Determinants of Health     Financial Resource Strain: Low Risk    • Difficulty of Paying Living Expenses: Not hard at all   Food Insecurity: Not on file   Transportation Needs: No Transportation Needs   • Lack of Transportation (Medical): No   • Lack of Transportation (Non-Medical): No   Physical Activity: Not on file   Stress: Not on file   Social Connections: Not on file   Intimate Partner Violence: Not on file   Housing Stability: Not on file        Physical Exam:     /76   Pulse 68   Temp (!) 97 3 °F (36 3 °C)   Resp 18   Ht 5' 4" (1 626 m)   Wt 74 8 kg (165 lb)   BMI 28 32 kg/m²     Physical Exam  Vitals and nursing note reviewed  Constitutional:       General: He is not in acute distress  Appearance: He is well-developed  He is not diaphoretic  HENT:      Head: Normocephalic and atraumatic  Right Ear: External ear normal       Left Ear: External ear normal       Nose: Nose normal       Mouth/Throat:      Pharynx: No oropharyngeal exudate  Eyes:      General: No scleral icterus  Right eye: No discharge  Left eye: No discharge  Pupils: Pupils are equal, round, and reactive to light  Neck:      Thyroid: No thyromegaly  Cardiovascular:      Rate and Rhythm: Normal rate  Heart sounds: Murmur heard  Pulmonary:      Effort: Pulmonary effort is normal  No respiratory distress  Breath sounds: Normal breath sounds  No wheezing  Abdominal:      General: Bowel sounds are normal  There is no distension  Palpations: Abdomen is soft  There is no mass  Tenderness: There is no abdominal tenderness  There is no guarding or rebound  Musculoskeletal:         General: Normal range of motion  Comments: Decreased lumbar lordosis   Skin:     General: Skin is warm and dry  Findings: No erythema or rash  Neurological:      Mental Status: He is alert        Coordination: Coordination normal       Deep Tendon Reflexes: Reflexes normal    Psychiatric:         Behavior: Behavior normal           Data:     Pre-operative work-up    Laboratory Results: no labs ordered as per pt, some ordered by me today, he will check with surgery     EKG: pt was cleared by cardiology    Recent Results (from the past 672 hour(s))   CBC    Collection Time: 03/23/23  9:23 AM   Result Value Ref Range    WBC 7 44 4 31 - 10 16 Thousand/uL    RBC 4 14 3 88 - 5 62 Million/uL    Hemoglobin 13 7 12 0 - 17 0 g/dL    Hematocrit 41 3 36 5 - 49 3 %     (H) 82 - 98 fL    MCH 33 1 26 8 - 34 3 pg    MCHC 33 2 31 4 - 37 4 g/dL    RDW 13 1 11 6 - 15 1 %    Platelets 537 315 - 773 Thousands/uL    MPV 9 6 8 9 - 12 7 fL   Comprehensive metabolic panel    Collection Time: 03/23/23  9:23 AM   Result Value Ref Range    Sodium 135 135 - 147 mmol/L    Potassium 4 6 3 5 - 5 3 mmol/L    Chloride 100 96 - 108 mmol/L    CO2 29 21 - 32 mmol/L    ANION GAP 6 4 - 13 mmol/L    BUN 17 5 - 25 mg/dL    Creatinine 0 82 0 60 - 1 30 mg/dL    Glucose, Fasting 91 65 - 99 mg/dL    Calcium 8 9 8 4 - 10 2 mg/dL    AST 19 13 - 39 U/L    ALT 19 7 - 52 U/L    Alkaline Phosphatase 50 34 - 104 U/L    Total Protein 6 5 6 4 - 8 4 g/dL    Albumin 3 9 3 5 - 5 0 g/dL    Total Bilirubin 0 58 0 20 - 1 00 mg/dL    eGFR 83 ml/min/1 73sq m   Protime-INR    Collection Time: 03/23/23  9:23 AM   Result Value Ref Range    Protime 12 8 11 6 - 14 5 seconds    INR 0 95 0 84 - 1 19   APTT    Collection Time: 03/23/23  9:23 AM   Result Value Ref Range    PTT 25 23 - 37 seconds           Assessment & Recommendations:     Problem List Items Addressed This Visit        Cardiovascular and Mediastinum    Essential hypertension    Relevant Orders    CBC (Completed)    Comprehensive metabolic panel (Completed)    Protime-INR (Completed)    APTT (Completed)    Coronary artery disease involving native coronary artery of native heart without angina pectoris    Aortic root dilatation (HCC)       Other    S/P CABG x 2     Cleared by cardio         Spinal stenosis of lumbar region with neurogenic claudication - Primary    Relevant Orders    CBC (Completed)    Comprehensive metabolic panel (Completed)    Protime-INR (Completed)    APTT (Completed)    Major depressive disorder, single episode, mild (HCC)    History of bacterial meningitis   Other Visit Diagnoses     Pre-op examination        Relevant Orders    CBC (Completed)    Comprehensive metabolic panel (Completed)    Protime-INR (Completed)    APTT (Completed)          Pre-Op Evaluation Assessment  [de-identified] y o  male with planned surgery: Laminectomy  Known risk factors for perioperative complications: Coronary disease  Current medications which may produce withdrawal symptoms if withheld perioperatively: none  Pre-Op Evaluation Plan  1  Further preoperative workup as follows:   - awaiting labs    2  Medication Management/Recommendations:   - Patient has been instructed to avoid herbs or non-directed vitamins the week prior to surgery to ensure no drug interactions with perioperative surgical and anesthetic medications    - Patient has been instructed to avoid aspirin containing medications or non-steroidal anti-inflammatory drugs for the week preceding surgery  Zoloft - sip water in Am  Lipito - take at night  toprol - take at night  norvasc - sip  Of water  Losartan - ok with sip of water  3  Prophylaxis for cardiac events with perioperative beta-blockers: not indicated  4  Patient requires further consultation with: None    Clearance  Pt looks good - need to have lab data prior to final clearance       Labs are back - acceptable for surgery - pt is clear      Magali Bush, [address]  [address] [place] [address]  Phone#  [phone]  Fax#  [phone]

## 2023-03-23 ENCOUNTER — LAB (OUTPATIENT)
Dept: LAB | Facility: HOSPITAL | Age: 83
End: 2023-03-23

## 2023-03-23 ENCOUNTER — TELEPHONE (OUTPATIENT)
Dept: FAMILY MEDICINE CLINIC | Facility: CLINIC | Age: 83
End: 2023-03-23

## 2023-03-23 DIAGNOSIS — M48.062 SPINAL STENOSIS OF LUMBAR REGION WITH NEUROGENIC CLAUDICATION: ICD-10-CM

## 2023-03-23 DIAGNOSIS — Z01.818 PRE-OP EXAMINATION: ICD-10-CM

## 2023-03-23 DIAGNOSIS — I10 ESSENTIAL HYPERTENSION: ICD-10-CM

## 2023-03-23 LAB
ALBUMIN SERPL BCP-MCNC: 3.9 G/DL (ref 3.5–5)
ALP SERPL-CCNC: 50 U/L (ref 34–104)
ALT SERPL W P-5'-P-CCNC: 19 U/L (ref 7–52)
ANION GAP SERPL CALCULATED.3IONS-SCNC: 6 MMOL/L (ref 4–13)
APTT PPP: 25 SECONDS (ref 23–37)
AST SERPL W P-5'-P-CCNC: 19 U/L (ref 13–39)
BILIRUB SERPL-MCNC: 0.58 MG/DL (ref 0.2–1)
BUN SERPL-MCNC: 17 MG/DL (ref 5–25)
CALCIUM SERPL-MCNC: 8.9 MG/DL (ref 8.4–10.2)
CHLORIDE SERPL-SCNC: 100 MMOL/L (ref 96–108)
CO2 SERPL-SCNC: 29 MMOL/L (ref 21–32)
CREAT SERPL-MCNC: 0.82 MG/DL (ref 0.6–1.3)
ERYTHROCYTE [DISTWIDTH] IN BLOOD BY AUTOMATED COUNT: 13.1 % (ref 11.6–15.1)
GFR SERPL CREATININE-BSD FRML MDRD: 83 ML/MIN/1.73SQ M
GLUCOSE P FAST SERPL-MCNC: 91 MG/DL (ref 65–99)
HCT VFR BLD AUTO: 41.3 % (ref 36.5–49.3)
HGB BLD-MCNC: 13.7 G/DL (ref 12–17)
INR PPP: 0.95 (ref 0.84–1.19)
MCH RBC QN AUTO: 33.1 PG (ref 26.8–34.3)
MCHC RBC AUTO-ENTMCNC: 33.2 G/DL (ref 31.4–37.4)
MCV RBC AUTO: 100 FL (ref 82–98)
PLATELET # BLD AUTO: 207 THOUSANDS/UL (ref 149–390)
PMV BLD AUTO: 9.6 FL (ref 8.9–12.7)
POTASSIUM SERPL-SCNC: 4.6 MMOL/L (ref 3.5–5.3)
PROT SERPL-MCNC: 6.5 G/DL (ref 6.4–8.4)
PROTHROMBIN TIME: 12.8 SECONDS (ref 11.6–14.5)
RBC # BLD AUTO: 4.14 MILLION/UL (ref 3.88–5.62)
SODIUM SERPL-SCNC: 135 MMOL/L (ref 135–147)
WBC # BLD AUTO: 7.44 THOUSAND/UL (ref 4.31–10.16)

## 2023-03-23 NOTE — TELEPHONE ENCOUNTER
Patient going for surgery and we were waiting on the labs to clear patient  Labs are in and Dr Xin Padilla resulted that the labs are acceptable  Dr Denis Wise office needs Dr Xin Padilla to addend the office visit stating that patient is cleared      Fax to [phone]

## 2023-03-30 ENCOUNTER — CLINICAL SUPPORT (OUTPATIENT)
Dept: FAMILY MEDICINE CLINIC | Facility: CLINIC | Age: 83
End: 2023-03-30

## 2023-03-30 DIAGNOSIS — Z11.52 ENCOUNTER FOR SCREENING FOR COVID-19: Primary | ICD-10-CM

## 2023-03-31 LAB — SARS-COV-2 RNA RESP QL NAA+PROBE: NEGATIVE

## 2023-05-02 ENCOUNTER — EVALUATION (OUTPATIENT)
Dept: PHYSICAL THERAPY | Facility: CLINIC | Age: 83
End: 2023-05-02

## 2023-05-02 DIAGNOSIS — R26.89 BALANCE PROBLEM: ICD-10-CM

## 2023-05-02 DIAGNOSIS — R26.81 GAIT INSTABILITY: ICD-10-CM

## 2023-05-02 DIAGNOSIS — M54.16 LUMBAR RADICULOPATHY: Primary | ICD-10-CM

## 2023-05-02 NOTE — PROGRESS NOTES
PT Evaluation   Today's date: 2023  Patient name: Zahida Gramajo  : 1940  MRN: [id number]  Referring provider: Gilad Blas DO  Dx:   Encounter Diagnosis     ICD-10-CM    1  Lumbar radiculopathy  M54 16       2  Balance problem  R26 89       3  Gait instability  R26 81               Assessment  Assessment details: Patient is a [de-identified] y o  Male who presents with referring diagnosis of lumbar radiculopathy  Patient's greatest concern is fear of not being able to keep active and future ill health (and wanting to prevent it)  Primary movement impairment diagnosis of movement coordination deficits due to post-operative status resulting in pathoanatomical symptoms of pain, restricted range of motion, muscular power deficits, cardiovascular endurance deficits, gait disturbances, balance deficits, and functional limitations  The aforementioned impairments have limited the patient's ability to bend/stoop, lift, walk long distances, and  one place  No further referral appears necessary at this time based upon examination results  Primary movement impairments:  1) Movement coordination deficits  2) Cardiovascular endurance deficits    Etiological factors include: onset of back pain in 2022        Impairments: Abnormal coordination, Abnormal gait, Abnormal muscle tone, Abnormal or restricted ROM, Activity intolerance, Impaired balance, Impaired physical strength, Lacks appropriate HEP, Poor posture, Poor body mechanics, Pain with function, Weight-bearing intolerance, Abnormal movement and Abnormal muscle firing  Understanding of Dx/Px/POC: Good  Prognosis: Fair   Positive prognostic factors: motivation for improvement   Negative prognostic factors: irritability of symptoms pre-operatively, comorbid conditions    Patient verbalized understanding of POC  Please contact me if you have any questions or recommendations  Thank you for the referral and the opportunity to share in [facility]'s care  Plan  Patient would benefit from: PT Eval and Skilled PT  Planned modality interventions: Biofeedback, Cryotherapy, TENS, Thermotherapy: Hydrocollator Packs and Traction  Planned therapy interventions: Abdominal trunk stabilization, ADL training, Balance, Balance/WB training, Body mechanics training, Coordination, Functional ROM exercises, Gait training, HEP, Joint mobilization, Manual therapy, Olivares taping, Motor coordination training, Neuromuscular re-education, Patient education, Postural training, Strengthening, Stretching, Therapeutic activities, Therapeutic exercises and Activity modification  Frequency: 2x/wk  Duration in weeks: 12  Plan of Care beginning date: 5/2/2023  Plan of Care expiration date: 12 weeks - 7/25/2023  Treatment plan discussed with: Patient       Goals  Short Term Goals (6 weeks):    - Patient will be independent in basic HEP 2-3 weeks  - Patient will report >50% reduction in pain  - Patient will demonstrate >1/3 improvement in MMT grade as applicable  - Ho will improve by >5 points (MCID)    Long Term Goals (12 weeks):  - Patient will be independent in a comprehensive home exercise program  - Patient FOTO score will improve to >55/100  - Patient will self-report >75% improvement in function  - FGA will improve by >6 points (MCID)  - 6 MWT will improve by >190 feet (MCID)      Subjective    History of Present Illness  - Mechanism of injury: Patient reports to physical therapy s/p laminectomy performed 4/5/23  States that prior to surgery, he was experience numbness and tingling into bilateral LEs, back pain, and limitation in ADLs  States he has been getting more used to walking and been able to wean from [facility]  Reports continued difficulty performing transfers since surgery  Mainly denies numbness/tingling/pain since surgery  Main goals are walk better and improve transfers   States that he does have torn rotator cuff, usually doesn't lift heavy  States that he is unsure of any current lifting/spinal precautions  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  States that he did sustain fall last week when attempting to get up off of a chair  States he has some right sided rib soreness, denies hitting head  Pain  - Current pain ratin/10  - At best pain ratin/10  - At worst pain ratin/10      Objective      Postural Assessment  - Posture in Standing: right lateral shift in standing; forward flexed position    Sensation  - Light touch: grossly intact and equal bilaterally   - Reflexes:   Left: Patellar: 2+  Achilles: 2+   Right: Patellar: 2+  Achilles: 2+     LE MMT  LEFT  RIGHT  -Hip Flexion:   4+/5  4+/5  -Hip Extension:  4  4/5   -Hip Abduction:   4/5  -Hip Adduction: 4+/5  4+/5  -Hip IR:    4/5  -Hip ER:    4/5    -Knee Flexion  4+/5  4+/5  -Knee Extension 4+/5  4+/5    -Ankle DF  4+/5  4/5  -Ankle PF  4+/5  4+/5    Lumbar Spine Range of Motion  Flexion: Moderately limited  with pain  Extension:  Moderately limited  without pain  Lateral Flexion - Left:  Minimally limited  without pain  Lateral Flexion - Right:  Minimally limited  without pain    Diagnostic Tests Performed  Positive: Slump (on bilat)     Functional Assessment  - Gait Assessment: decreased step length and gait speed, right lateral shift noted with increased hip ER on right   - 5 time sit to stand: 16 02 seconds from standard chair with min use of hands   - 6 minute walk test: 920 feet (2 minute walk: 325 feet)  - Ho/56  - FGA: 14  - Gait Speed: 0 83 m/s               Insurance:  AMA/CMS Eval/ Re-eval POC expires Jayjay Lizycachorro #/ Referral # Total units  Start date  Expiration date Extension  Visit limitation? PT only or  PT+OT?  Co-Insurance   [facility] MCR: CMS [id number] SUBMITTED       $30 co-pay                                                                  Date 5-2              Units:  Used 1 Authed:  Remaining                     Date               Units:  Used               Authed:  Remaining                      Date 5/2/23        Visit Number IE        Manual                                                      TherEx         LTR                           Neuro Re-Ed         Bridges         Sciatic nerve glide / hamstring stretch         Sit <> stands         Resisted ambulation         Lateral stepping         Stair training                                                                        TherAct         Patient education                                             Gait Training                                    Modalities         CP             Precautions:   Past Medical History:   Diagnosis Date    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

## 2023-05-02 NOTE — LETTER
May 3, 2023    [address]    Patient: Nancy You   YOB: 1940   Date of Visit: 2023     Encounter Diagnosis     ICD-10-CM    1  Lumbar radiculopathy  M54 16       2  Balance problem  R26 89       3  Gait instability  R26 81           Dear Dr Yancey Knee:    Thank you for your recent referral of Bettina Lakhani  Please review the attached evaluation summary from Paul's recent visit  Please verify that you agree with the plan of care by signing the attached order  If you have any questions or concerns, please do not hesitate to call  I sincerely appreciate the opportunity to share in the care of one of your patients and hope to have another opportunity to work with you in the near future  Sincerely,    Stew Tadeo, PT      Referring Provider:      I certify that I have read the below Plan of Care and certify the need for these services furnished under this plan of treatment while under my care  Roxnaa Pate [address] [place] [address]  Via Fax: [phone]                                                                              PT Evaluation   Today's date: 2023  Patient name: Nancy You  : 1940  MRN: [id number]  Referring provider: Bettie Wilson DO  Dx:   Encounter Diagnosis     ICD-10-CM    1  Lumbar radiculopathy  M54 16       2  Balance problem  R26 89       3  Gait instability  R26 81               Assessment  Assessment details: Patient is a [de-identified] y o  Male who presents with referring diagnosis of lumbar radiculopathy  Patient's greatest concern is fear of not being able to keep active and future ill health (and wanting to prevent it)      Primary movement impairment diagnosis of movement coordination deficits due to post-operative status resulting in pathoanatomical symptoms of pain, restricted range of motion, muscular power deficits, cardiovascular endurance deficits, gait disturbances, balance deficits, and functional limitations  The aforementioned impairments have limited the patient's ability to bend/stoop, lift, walk long distances, and  one place  No further referral appears necessary at this time based upon examination results  Primary movement impairments:  1) Movement coordination deficits  2) Cardiovascular endurance deficits    Etiological factors include: onset of back pain in August of 2022        Impairments: Abnormal coordination, Abnormal gait, Abnormal muscle tone, Abnormal or restricted ROM, Activity intolerance, Impaired balance, Impaired physical strength, Lacks appropriate HEP, Poor posture, Poor body mechanics, Pain with function, Weight-bearing intolerance, Abnormal movement and Abnormal muscle firing  Understanding of Dx/Px/POC: Good  Prognosis: Fair   Positive prognostic factors: motivation for improvement   Negative prognostic factors: irritability of symptoms pre-operatively, comorbid conditions    Patient verbalized understanding of POC  Please contact me if you have any questions or recommendations  Thank you for the referral and the opportunity to share in [facility]'s care          Plan  Patient would benefit from: PT Eval and Skilled PT  Planned modality interventions: Biofeedback, Cryotherapy, TENS, Thermotherapy: Hydrocollator Packs and Traction  Planned therapy interventions: Abdominal trunk stabilization, ADL training, Balance, Balance/WB training, Body mechanics training, Coordination, Functional ROM exercises, Gait training, HEP, Joint mobilization, Manual therapy, Olivares taping, Motor coordination training, Neuromuscular re-education, Patient education, Postural training, Strengthening, Stretching, Therapeutic activities, Therapeutic exercises and Activity modification  Frequency: 2x/wk  Duration in weeks: 12  Plan of Care beginning date: 5/2/2023  Plan of Care expiration date: 12 weeks - 7/25/2023  Treatment plan discussed with: Patient       Goals  Short Term Goals (6 weeks):    - Patient will be independent in basic HEP 2-3 weeks  - Patient will report >50% reduction in pain  - Patient will demonstrate >1/3 improvement in MMT grade as applicable  - Naa Langleys will improve by >5 points (MCID)    Long Term Goals (12 weeks):  - Patient will be independent in a comprehensive home exercise program  - Patient FOTO score will improve to >55/100  - Patient will self-report >75% improvement in function  - FGA will improve by >6 points (MCID)  - 6 MWT will improve by >190 feet (MCID)      Subjective    History of Present Illness  - Mechanism of injury: Patient reports to physical therapy s/p laminectomy performed 23  States that prior to surgery, he was experience numbness and tingling into bilateral LEs, back pain, and limitation in ADLs  States he has been getting more used to walking and been able to wean from [employer]  Reports continued difficulty performing transfers since surgery  Mainly denies numbness/tingling/pain since surgery  Main goals are walk better and improve transfers  States that he does have torn rotator cuff, usually doesn't lift heavy  States that he is unsure of any current lifting/spinal precautions  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  States that he did sustain fall last week when attempting to get up off of a chair  States he has some right sided rib soreness, denies hitting head        Pain  - Current pain ratin/10  - At best pain ratin/10  - At worst pain ratin/10      Objective      Postural Assessment  - Posture in Standing: right lateral shift in standing; forward flexed position    Sensation  - Light touch: grossly intact and equal bilaterally   - Reflexes:   Left: Patellar: 2+  Achilles: 2+   Right: Patellar: 2+  Achilles: 2+     LE MMT  LEFT  RIGHT  -Hip Flexion:   4+/5  4+/5  -Hip Extension:  4/5  4/5   -Hip Abduction: 4/5  4/5  -Hip Adduction: 4+/5  4+/5  -Hip IR:    4  -Hip ER:      -Knee Flexion  4+/5  4+/5  -Knee Extension 4+/5  4+/5    -Ankle DF  4+/  4/5  -Ankle PF  4+/5  4+/5    Lumbar Spine Range of Motion  Flexion: Moderately limited  with pain  Extension:  Moderately limited  without pain  Lateral Flexion - Left:  Minimally limited  without pain  Lateral Flexion - Right:  Minimally limited  without pain    Diagnostic Tests Performed  Positive: Slump (on bilat)     Functional Assessment  - Gait Assessment: decreased step length and gait speed, right lateral shift noted with increased hip ER on right   - 5 time sit to stand: 16 02 seconds from standard chair with min use of hands   - 6 minute walk test: 920 feet (2 minute walk: 325 feet)  - Ho/56  - FGA: 14  - Gait Speed: 0 83 m/s              Insurance:  A/CMS Eval/ Re-eval POC expires [facility] #/ Referral # Total units  Start date  Expiration date Extension  Visit limitation? PT only or  PT+OT?  Co-Insurance   [facility] MCR: CMS 23 40 SUBMITTED       $30 co-pay                                                                  Date               Units:  Used 1              Authed:  Remaining                     Date               Units:  Used               Authed:  Remaining                      Date 23        Visit Number IE        Manual                                                      TherEx         LTR                           Neuro Re-Ed         Bridges         Sciatic nerve glide / hamstring stretch         Sit <> stands         Resisted ambulation         Lateral stepping         Stair training                                                                        TherAct         Patient education                                             Gait Training                                    Modalities         CP             Precautions:   Past Medical History:   Diagnosis Date    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

## 2023-05-04 ENCOUNTER — OFFICE VISIT (OUTPATIENT)
Dept: PHYSICAL THERAPY | Facility: CLINIC | Age: 83
End: 2023-05-04

## 2023-05-04 DIAGNOSIS — R26.81 GAIT INSTABILITY: ICD-10-CM

## 2023-05-04 DIAGNOSIS — R26.89 BALANCE PROBLEM: ICD-10-CM

## 2023-05-04 DIAGNOSIS — M54.16 LUMBAR RADICULOPATHY: Primary | ICD-10-CM

## 2023-05-04 NOTE — PROGRESS NOTES
"Daily Note     Today's date: 2023  Patient name: Ochoa Ríos  : 1940  MRN: [id number]  Referring provider: Sheila Disla DO  Dx:   Encounter Diagnosis     ICD-10-CM    1  Lumbar radiculopathy  M54 16       2  Balance problem  R26 89       3  Gait instability  R26 81           Start Time: 1100  Stop Time: 1145  Total time in clinic (min): 45 minutes    Subjective: Patient with no new complaints at the start of today's session  Objective: See treatment diary below      Assessment: Tolerated treatment well  Patient provided with consistent verbal cues with proximal strengthening progressions for alignment and to limit compensatory hip ER  Initiated sciatic nerve sliders with significant tension noted bilaterally  Utilized DB w/ sit to stands to promote anterior trunk translation and to dec reliance on hands  Patient educated on DOMS for 24-48 hours post session, verbalized good understanding/agreement  Patient will continue to benefit from skilled PT to improve functional mobility and activity tolerance  Plan: Continue per plan of care  Insurance:  [place]/CMS Eval/ Re-eval POC expires [facility] #/ Referral # Total units  Start date  Expiration date Extension  Visit limitation? PT only or  PT+OT?  Co-Insurance   [facility] MCR: CMS 23 40 SUBMITTED       $30 co-pay                                                                  Date              Units:  Used 1 1             Authed:  Remaining                     Date               Units:  Used               Authed:  Remaining                      Date 23       Visit Number IE 2       Manual                                                      TherEx         LTR  5\" x20 each       Stand hip abd  3x10 each       Stand hip ext  3x10 each                         Neuro Re-Ed         Bridges  3x10       Sciatic nerve glide / hamstring stretch  x15 each       Sit <> stands  x10    3x10 w/ 5# DB hold       Resisted " "ambulation         Lateral stepping  15 feet, 4 laps       Stair training  6\" alt step ups, 3x5 each without UE support (circuit w/ hip abd, hip ext)                                                                      TherAct         Patient education                                             Gait Training                                    Modalities         CP             Precautions:   Past Medical History:   Diagnosis Date    Communicating hydrocephalus ([facility] ) 1/23/2012    Formatting of this note might be different from the original  Overview:  s/p ventriculostomy 1/12/99 1/23 lm    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

## 2023-05-09 ENCOUNTER — OFFICE VISIT (OUTPATIENT)
Dept: PHYSICAL THERAPY | Facility: CLINIC | Age: 83
End: 2023-05-09

## 2023-05-09 DIAGNOSIS — R26.81 GAIT INSTABILITY: ICD-10-CM

## 2023-05-09 DIAGNOSIS — R26.89 BALANCE PROBLEM: ICD-10-CM

## 2023-05-09 DIAGNOSIS — M54.16 LUMBAR RADICULOPATHY: Primary | ICD-10-CM

## 2023-05-09 NOTE — PROGRESS NOTES
"Daily Note     Today's date: 2023  Patient name: Riri Tate  : 1940  MRN: [id number]  Referring provider: Jose Angel Pineda DO  Dx:   Encounter Diagnosis     ICD-10-CM    1  Lumbar radiculopathy  M54 16       2  Balance problem  R26 89       3  Gait instability  R26 81           Start Time: 1015  Stop Time: 1055  Total time in clinic (min): 40 minutes    Subjective: Patient reports experiencing B LE soreness/stiffness for ~3 days following previous session  Objective: See treatment diary below      Assessment: Tolerated treatment well  Limited progression and intensity of today's session due to fatigue and soreness following previous session  Initiated slouch overcorrect with PB, which was seb well and pt reported reduction in stiffness in low back post  Patient cont to require verbal cues w/ proximal strengthening progressions to improve alignment and glute recruitment  Patient with fatigue post session  Patient will continue to benefit from skilled PT to improve functional mobility and activity tolerance  Plan: Continue per plan of care  Insurance:  A/CMS Eval/ Re-eval POC expires [facility] #/ Referral # Total units  Start date  Expiration date Extension  Visit limitation? PT only or  PT+OT?  Co-Insurance   [facility] MCR: CMS 23 40 SUBMITTED       $30 co-pay                                                                  Date             Units:  Used 1 1 1            Authed:  Remaining                     Date               Units:  Used               Authed:  Remaining                      Date 23      Visit Number IE 2 3      Manual                                                      TherEx         LTR  5\" x20 each 5\" x20 each      Stand hip abd  3x10 each 3x10 each      Stand hip ext  3x10 each 3x10 each      LAQ   3x10      Seated march   3x10                                 Neuro Re-Ed         Bridges  3x10 3x10      Sciatic nerve glide / " "hamstring stretch  x15 each x20 each      Sit <> stands  x10    3x10 w/ 5# DB hold 2x10 w/ 5# DB hold      Resisted ambulation         Lateral stepping  15 feet, 4 laps 15 feet, 4 laps      Stair training  6\" alt step ups, 3x5 each without UE support (circuit w/ hip abd, hip ext) hold      Slouch overcorrect w/ PB   5\" x20                                                            TherAct         Patient education                                             Gait Training                                    Modalities         CP             Precautions:   Past Medical History:   Diagnosis Date   • Communicating hydrocephalus ([facility] ) 1/23/2012    Formatting of this note might be different from the original  Overview:  s/p ventriculostomy 1/12/99 1/23 lm   • 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

## 2023-05-11 ENCOUNTER — OFFICE VISIT (OUTPATIENT)
Dept: PHYSICAL THERAPY | Facility: CLINIC | Age: 83
End: 2023-05-11

## 2023-05-11 DIAGNOSIS — M54.16 LUMBAR RADICULOPATHY: Primary | ICD-10-CM

## 2023-05-11 DIAGNOSIS — R26.89 BALANCE PROBLEM: ICD-10-CM

## 2023-05-11 NOTE — PROGRESS NOTES
"Daily Note     Today's date: 2023  Patient name: Tia Shafer  : 1940  MRN: [id number]  Referring provider: Alex Hamlin DO  Dx:   Encounter Diagnosis     ICD-10-CM    1  Lumbar radiculopathy  M54 16       2  Balance problem  R26 89           Start Time: 1335  Stop Time: 1406  Total time in clinic (min): 31 minutes    Subjective: Patient reports he felt good after last visit  His back is still weak, but he walked a good bit before coming into the clinic today  Weakness, no pain      Objective: See treatment diary below      Assessment: Tolerated treatment well  Able to reintroduce stairs and squats without increased pain or fatigue  Patient demonstrated fatigue post treatment, exhibited good technique with therapeutic exercises and would benefit from continued PT  Plan: Continue per plan of care  Insurance:  A/CMS Eval/ Re-eval POC expires [facility] #/ Referral # Total units  Start date  Expiration date Extension  Visit limitation? PT only or  PT+OT?  Co-Insurance   [facility] MCR: CMS 23 40 SUBMITTED       $30 co-pay                                                                  Date            Units:  Used 1 1 1 1           Authed:  Remaining                     Date               Units:  Used               Authed:  Remaining                      Date 23     Visit Number IE 2 3 4     Manual                                                      TherEx         LTR  5\" x20 each 5\" x20 each 5\" x20 each     Stand hip abd  3x10 each 3x10 each 3x10 each     Stand hip ext  3x10 each 3x10 each 3x10 each     LAQ   3x10 3x10     Seated march   3x10 x30                                Neuro Re-Ed         Bridges  3x10 3x10 3x10     Sciatic nerve glide / hamstring stretch  x15 each x20 each x20 each     Sit <> stands  x10    3x10 w/ 5# DB hold 2x10 w/ 5# DB hold 2x10 w/ 5# DB hold     Resisted ambulation         Lateral stepping  15 feet, 4 laps 15 " "feet, 4 laps 15 feet, 4 laps     Stair training  6\" alt step ups, 3x5 each without UE support (circuit w/ hip abd, hip ext) hold 4\" alt step ups, 3x5 each without UE support (circuit w/ hip abd, hip ext)     Slouch overcorrect w/ PB   5\" x20 5s 2x10     Squats    2x10 w UE support                                                  TherAct         Patient education                                             Gait Training                                    Modalities         CP             Precautions:   Past Medical History:   Diagnosis Date   • Communicating hydrocephalus ([facility] ) 1/23/2012    Formatting of this note might be different from the original  Overview:  s/p ventriculostomy 1/12/99 1/23 lm   • 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

## 2023-05-16 ENCOUNTER — OFFICE VISIT (OUTPATIENT)
Dept: PHYSICAL THERAPY | Facility: CLINIC | Age: 83
End: 2023-05-16

## 2023-05-16 DIAGNOSIS — R26.89 BALANCE PROBLEM: ICD-10-CM

## 2023-05-16 DIAGNOSIS — R26.81 GAIT INSTABILITY: ICD-10-CM

## 2023-05-16 DIAGNOSIS — M54.16 LUMBAR RADICULOPATHY: Primary | ICD-10-CM

## 2023-05-16 NOTE — PROGRESS NOTES
"Daily Note     Today's date: 2023  Patient name: Kassie Soto  : 1940  MRN: [id number]  Referring provider: Crow Garcia DO  Dx:   Encounter Diagnosis     ICD-10-CM    1  Lumbar radiculopathy  M54 16       2  Balance problem  R26 89       3  Gait instability  R26 81           Start Time: 1015  Stop Time: 1053  Total time in clinic (min): 38 minutes    Subjective: Patient reports overall improvement in function since starting PT, although he noted quick fatigue of his back  States he avoids walking uphill due to quicker onset of discomfort  Objective: See treatment diary below      Assessment: Tolerated treatment well  Patient demo improving tolerance to functional progressions, including pacing of sit <> stands and ability to wean UE support with stair training  Initiated ambulation on treadmill starting with 3% including during today's session  Patient provided with consistent verbal cues to maintain upright posture  Ambulation tolerated well, although mild increase in low back discomfort noted post; mitigated by slouch overcorrect  Patient with fatigue post session  Patient will continue to benefit from skilled PT to improve functional mobility and activity tolerance  Plan: Continue per plan of care  Insurance:  A/CMS Eval/ Re-eval POC expires [facility] #/ Referral # Total visits Start date  Expiration date Extension  Visit limitation? PT only or  PT+OT?  Co-Insurance   [facility] MCR: CMS 23 40 SUBMITTED 8      $30 co-pay                                                                  Date ---16          Units:  Used 1 1 1 1 1          Authed:  Remaining  7 6 5 4 3                 Date               Units:  Used               Authed:  Remaining                      Date 23    Visit Number IE 2 3 4 5    Manual                                                      TherEx         LTR  5\" x20 each 5\" x20 each 5\" x20 each 5\" x20 " "each    Stand hip abd  3x10 each 3x10 each 3x10 each 3x10 each    Stand hip ext  3x10 each 3x10 each 3x10 each 3x10 each    LAQ   3x10 3x10     Seated march   3x10 x30                                Neuro Re-Ed         Bridges  3x10 3x10 3x10 3x10     Sciatic nerve glide / hamstring stretch  x15 each x20 each x20 each x20 each (neutral head position)    Sit <> stands  x10    3x10 w/ 5# DB hold 2x10 w/ 5# DB hold 2x10 w/ 5# DB hold 2x15 w/ 5# DB hold    Resisted ambulation         Lateral stepping  15 feet, 4 laps 15 feet, 4 laps 15 feet, 4 laps 15 feet, 5 laps    Stair training  6\" alt step ups, 3x5 each without UE support (circuit w/ hip abd, hip ext) hold 4\" alt step ups, 3x5 each without UE support (circuit w/ hip abd, hip ext) 6\" alt steps, x15 each (without UE support)    Slouch overcorrect w/ PB   5\" x20 5s 2x10 5\" x20    Squats    2x10 w UE support                                                    TherAct         Patient education                                             Gait Training              TM 5 min total 1 0-1 5 mph, 3% incline                      Modalities         CP             Precautions:   Past Medical History:   Diagnosis Date   • Communicating hydrocephalus ([facility] ) 1/23/2012    Formatting of this note might be different from the original  Overview:  s/p ventriculostomy 1/12/99 1/23 lm   • Trans[facility] ([facility] ) 4/22/2009                    "

## 2023-05-18 ENCOUNTER — OFFICE VISIT (OUTPATIENT)
Dept: PHYSICAL THERAPY | Facility: CLINIC | Age: 83
End: 2023-05-18

## 2023-05-18 DIAGNOSIS — R26.89 BALANCE PROBLEM: ICD-10-CM

## 2023-05-18 DIAGNOSIS — M54.16 LUMBAR RADICULOPATHY: Primary | ICD-10-CM

## 2023-05-18 DIAGNOSIS — R26.81 GAIT INSTABILITY: ICD-10-CM

## 2023-05-18 NOTE — PROGRESS NOTES
Daily Note     Today's date: 2023  Patient name: Krystian Farmer  : 1940  MRN: [id number]  Referring provider: Ana Maria Murphy DO  Dx:   Encounter Diagnosis     ICD-10-CM    1  Lumbar radiculopathy  M54 16       2  Balance problem  R26 89       3  Gait instability  R26 81           Start Time: 1015  Stop Time: 1055  Total time in clinic (min): 40 minutes    Subjective: Patient with no new complaints at start of today's session  States he feels like he is getting close to being ready for discharge  Objective: See treatment diary below    - 5 time sit to stand: (IE: 16 02 seconds from standard chair with min use of hands) (23: 13 18 seconds without hands)  - 6 minute walk test: (IE: 920 feet) (23: 1175)       Assessment: Tolerated treatment well  Patient demo improvement in functional strength and cardiovascular endurance since start of care, demo by improvements >MCID for both 5x STS and 6 MWT  Patient challenged with progression of balance activities to foam, 3 episodes of LoB noted, although pt able to independently regain balance; CG provided throughout  Patient with fatigue post session  Patient will continue to benefit from skilled PT to improve functional mobility and activity tolerance  Plan: Continue per plan of care  D/C to gym program in 2 sessions         Insurance:  A/CMS Eval/ Re-eval POC expires [facility] #/ Referral # Total visits Start date  Expiration date Extension  Visit limitation? PT only or  PT+OT?  Co-Insurance   [facility] MCR: CMS 23 40 SUBMITTED 8      $30 co-pay                                                                  Date ----         Units:  Used 1 1 1 1 1 1         Authed:  Remaining  7 6 5 4 3 2                Date               Units:  Used               Authed:  Remaining                      Date 23   Visit Number IE 2 3 4 5 6 - FOTO   Manual "                    TherEx         LTR  5\" x20 each 5\" x20 each 5\" x20 each 5\" x20 each    Stand hip abd  3x10 each 3x10 each 3x10 each 3x10 each 3\" 3x10 each   Stand hip ext  3x10 each 3x10 each 3x10 each 3x10 each 3\" 3x10 each   LAQ   3x10 3x10     Seated march   3x10 x30                                Neuro Re-Ed         Bridges  3x10 3x10 3x10 3x10     Sciatic nerve glide / hamstring stretch  x15 each x20 each x20 each x20 each (neutral head position)    Sit <> stands  x10    3x10 w/ 5# DB hold 2x10 w/ 5# DB hold 2x10 w/ 5# DB hold 2x15 w/ 5# DB hold 3x10 with tidal tank KB hold, 3x10   Resisted ambulation         Lateral stepping  15 feet, 4 laps 15 feet, 4 laps 15 feet, 4 laps 15 feet, 5 laps 15 feet, 5 laps   Stair training  6\" alt step ups, 3x5 each without UE support (circuit w/ hip abd, hip ext) hold 4\" alt step ups, 3x5 each without UE support (circuit w/ hip abd, hip ext) 6\" alt steps, x15 each (without UE support) 6\" 2x10 each with KB tidal tank (unilat hold)   Slouch overcorrect w/ PB   5\" x20 5s 2x10 5\" x20 5\" x20   Squats    2x10 w UE support     Tandem walk      On foam, 6 laps, CG   Lateral step on/off foam      On foam, 1 lap, CG                              TherAct         Patient education      6 MWT, 5x STS, foto                                       Gait Training              TM 5 min total 1 0-1 5 mph, 3% incline                      Modalities         CP             Precautions:   Past Medical History:   Diagnosis Date   • Communicating hydrocephalus ([facility] ) 1/23/2012    Formatting of this note might be different from the original  Overview:  s/p ventriculostomy 1/12/99 1/23 lm   • 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

## 2023-05-23 ENCOUNTER — OFFICE VISIT (OUTPATIENT)
Dept: PHYSICAL THERAPY | Facility: CLINIC | Age: 83
End: 2023-05-23

## 2023-05-23 DIAGNOSIS — M54.16 LUMBAR RADICULOPATHY: Primary | ICD-10-CM

## 2023-05-23 DIAGNOSIS — R26.81 GAIT INSTABILITY: ICD-10-CM

## 2023-05-23 DIAGNOSIS — R26.89 BALANCE PROBLEM: ICD-10-CM

## 2023-05-23 NOTE — PROGRESS NOTES
"Daily Note     Today's date: 2023  Patient name: Prabha Alonzo  : 1940  MRN: [id number]  Referring provider: Leann Vieira DO  Dx:   Encounter Diagnosis     ICD-10-CM    1  Lumbar radiculopathy  M54 16       2  Balance problem  R26 89       3  Gait instability  R26 81           Start Time: 1145  Stop Time: 1223  Total time in clinic (min): 38 minutes    Subjective: Patient reports no new complaints at start of session  Objective: See treatment diary below      Assessment: Tolerated treatment well  Patient demonstrated fatigue post treatment, exhibited good technique with therapeutic exercises and would benefit from continued PT  Improved ability to self correct LOB on foam  Able to progress resistance with good performance and appropriate levels of fatigue  Plan: Continue per plan of care  Potential discharge next visit  Insurance:  A/CMS Eval/ Re-eval POC expires Alejandro Skill #/ Referral # Total visits Start date  Expiration date Extension  Visit limitation? PT only or  PT+OT?  Co-Insurance   [facility] MCR: CMS [id number] SUBMITTED 8      $30 co-pay                                                                  Date         Units:  Used 1 1 1 1 1 1 1        Authed:  Remaining  7 6 5 4 3 2 1               Date               Units:  Used               Authed:  Remaining                      Date 23   Visit Number IE 2 3 4 5 6 - FOTO 7   Manual                                                            TherEx          LTR  5\" x20 each 5\" x20 each 5\" x20 each 5\" x20 each     Stand hip abd  3x10 each 3x10 each 3x10 each 3x10 each 3\" 3x10 each 3x10 each light loop   Stand hip ext  3x10 each 3x10 each 3x10 each 3x10 each 3\" 3x10 each 3x10 each light loop   LAQ   3x10 3x10         3x10 x30      Leg press       85# 3x10                       Neuro Re-Ed          Bridges  3x10 3x10 3x10 3x10    " "  Sciatic nerve glide / hamstring stretch  x15 each x20 each x20 each x20 each (neutral head position)     Sit <> stands  x10    3x10 w/ 5# DB hold 2x10 w/ 5# DB hold 2x10 w/ 5# DB hold 2x15 w/ 5# DB hold 3x10 with tidal tank KB hold, 3x10 3x10 with tidal tank KB hold   Resisted ambulation       ~300ft/3 min   Lateral stepping  15 feet, 4 laps 15 feet, 4 laps 15 feet, 4 laps 15 feet, 5 laps 15 feet, 5 laps 15 feet, 5 laps + 7# TT   Stair training  6\" alt step ups, 3x5 each without UE support (circuit w/ hip abd, hip ext) hold 4\" alt step ups, 3x5 each without UE support (circuit w/ hip abd, hip ext) 6\" alt steps, x15 each (without UE support) 6\" 2x10 each with KB tidal tank (unilat hold) 6\" 2x10 each with KB tidal tank (unilat hold)   Slouch overcorrect w/ PB   5\" x20 5s 2x10 5\" x20 5\" x20    Squats    2x10 w UE support   3x10 UE support   Tandem walk      On foam, 6 laps, CG On foam, 5 laps   Lateral step on/off foam      On foam, 1 lap, CG On foam, 3 laps                                 TherAct          Patient education      6 MWT, 5x STS, foto Gym program                                           Gait Training               TM 5 min total 1 0-1 5 mph, 3% incline  TM 5 min total 1 0-1 5 mph, 3% incline                       Modalities          CP              Precautions:   Past Medical History:   Diagnosis Date   • [facility] ) 1/23/2012    Formatting of this note might be different from the original  Overview:  s/p ventriculostomy 1/12/99 1/23 lm   • 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

## 2023-05-30 ENCOUNTER — OFFICE VISIT (OUTPATIENT)
Dept: PHYSICAL THERAPY | Facility: CLINIC | Age: 83
End: 2023-05-30

## 2023-05-30 DIAGNOSIS — R26.89 BALANCE PROBLEM: ICD-10-CM

## 2023-05-30 DIAGNOSIS — M54.16 LUMBAR RADICULOPATHY: Primary | ICD-10-CM

## 2023-05-30 DIAGNOSIS — R26.81 GAIT INSTABILITY: ICD-10-CM

## 2023-05-30 NOTE — PROGRESS NOTES
PT Discharge  Today's date: 2023  Patient name: Sadia Beasley  : 1940  MRN: [id number]  Referring provider: Td Zamora DO  Dx:   Encounter Diagnosis     ICD-10-CM    1  Lumbar radiculopathy  M54 16       2  Balance problem  R26 89       3  Gait instability  R26 81               Assessment  Assessment details: Patient is a [de-identified] y o  Male who presents with referring diagnosis of lumbar radiculopathy  Patient's greatest concern is fear of not being able to keep active and future ill health (and wanting to prevent it)  Patient has made gains in strength, range of motion, cardiovascular endurance, and functional mobility since starting physical therapy  He has demonstrated significant improvement in objective measures, including BBS, FGA, 6 MWT, and 5x sit to stand  He has demonstrated improved tolerance to prolonged ambulation and usual activities  As the patient has met all previously set goals, they are being discharged from physical therapy episode in favor of independent home exercise program  HEP was updated and reviewed during today's session and patient expressed no questions/concerns with discharge  Please contact me if you have any questions or recommendations  Thank you for the referral and the opportunity to share in [facility] care          Plan  Discharge from physical therapy episode in favor of independent gym program      Goals  Short Term Goals (6 weeks):  - ALL MET  - Patient will be independent in basic HEP 2-3 weeks  - Patient will report >50% reduction in pain  - Patient will demonstrate >1/3 improvement in MMT grade as applicable  - Wynema Hammans will improve by >5 points (MCID)    Long Term Goals (12 weeks): - ALL MET  - Patient will be independent in a comprehensive home exercise program  - Patient FOTO score will improve to >55/100  - Patient will self-report >75% improvement in function  - FGA will improve by >6 points (MCID)  - 6 MWT will improve by >190 feet (MCID)      Subjective    History of Present Illness  - Mechanism of injury: Patient reports to physical therapy s/p laminectomy performed 23  States that prior to surgery, he was experience numbness and tingling into bilateral LEs, back pain, and limitation in ADLs  States he has been getting more used to walking and been able to wean from [facility]  Reports continued difficulty performing transfers since surgery  Mainly denies numbness/tingling/pain since surgery  Main goals are walk better and improve transfers  States that he does have torn rotator cuff, usually doesn't lift heavy  States that he is unsure of any current lifting/spinal precautions  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  States that he did sustain fall last week when attempting to get up off of a chair  States he has some right sided rib soreness, denies hitting head  2023: Patient reports overall improvement in tolerance to usual activities, including chores around the home and with transfers  Additionally, he has reported improvement in tolerance to prolonged ambulation  He states he feels prepared for discharge at this time and will be joining a gym to continue with strengthening        Pain  - Current pain ratin/10  - At best pain ratin/10  - At worst pain ratin/10      Objective      Postural Assessment  - Posture in Standing: right lateral shift in standing; forward flexed position    Sensation  - Light touch: grossly intact and equal bilaterally   - Reflexes:   Left: Patellar: 2+  Achilles: 2+   Right: Patellar: 2+  Achilles: 2+     LE MMT - grossly 4 to 4+/5 throughout at d/c on 23  LEFT  RIGHT  -Hip Flexion:   4+/5  4+/5  -Hip Extension:  4/  4/5   -Hip Abduction: 4/  4/5  -Hip Adduction: 4+/5  4+/5  -Hip IR:  /  4/5  -Hip ER:  /  4/5    -Knee Flexion  4+/5  4+/5  -Knee Extension 4+/5  4+/5    -Ankle DF  4+/5  4/5  -Ankle PF  4+/5  4+/5    Lumbar "Spine Range of Motion  Flexion: Moderately limited  with pain (23: moderately limited without pain)  Extension:  Moderately limited  without pain (23: moderately limited without pain)  Lateral Flexion - Left:  Minimally limited  without pain(23: minimally limited without pain)  Lateral Flexion - Right:  Minimally limited  without pain(23: minimally limited without pain)    Functional Assessment  - Gait Assessment: decreased step length and gait speed, right lateral shift noted with increased hip ER on right   - Ho/56 (23: 48/56)  - FGA: 14 (23: 22)  - 5 time sit to stand: (IE: 16 02 seconds from standard chair with min use of hands) (23: 13 18 seconds without hands)  - 6 minute walk test: (IE: 920 feet) (23: 1175 feet)          Insurance:  A/CMS Eval/ Re-eval POC expires Maryellen Shoulder #/ Referral # Total visits Start date  Expiration date Extension  Visit limitation? PT only or  PT+OT?  Co-Insurance   [facility] MCR: CMS 23 40 SUBMITTED 8      $30 co-pay                                                                  Date 5-2 5-4 5-9 5-11 5-16  530       Units:  Used 1 1 1 1 1 1 1 1       Authed:  Remaining  7 6 5 4 3 2 1 0              Date               Units:  Used               Authed:  Remaining                      Date 23   Visit Number 3 4 5 6 - FOTO 7 8   Manual                                                      TherEx         LTR 5\" x20 each 5\" x20 each 5\" x20 each      Stand hip abd 3x10 each 3x10 each 3x10 each 3\" 3x10 each 3x10 each light loop rev   Stand hip ext 3x10 each 3x10 each 3x10 each 3\" 3x10 each 3x10 each light loop rev   LAQ 3x10 3x10       Seated march 3x10 x30       Leg press     85# 3x10                      Neuro Re-Ed         Bridges 3x10 3x10 3x10    rev   Sciatic nerve glide / hamstring stretch x20 each x20 each x20 each (neutral head position)      Sit <> stands 2x10 w/ 5# DB hold " "2x10 w/ 5# DB hold 2x15 w/ 5# DB hold 3x10 with tidal tank KB hold, 3x10 3x10 with tidal tank KB hold    Resisted ambulation     ~300ft/3 min    Lateral stepping 15 feet, 4 laps 15 feet, 4 laps 15 feet, 5 laps 15 feet, 5 laps 15 feet, 5 laps + 7# TT    Stair training hold 4\" alt step ups, 3x5 each without UE support (circuit w/ hip abd, hip ext) 6\" alt steps, x15 each (without UE support) 6\" 2x10 each with KB tidal tank (unilat hold) 6\" 2x10 each with KB tidal tank (unilat hold) 8\" x10 each   Slouch overcorrect w/ PB 5\" x20 5s 2x10 5\" x20 5\" x20     Squats  2x10 w UE support   3x10 UE support rev   Tandem walk    On foam, 6 laps, CG On foam, 5 laps 15 feet, 5 laps   Lateral step on/off foam    On foam, 1 lap, CG On foam, 3 laps 15 feet, 5 laps   [place]       15 feet, 5 laps                     TherAct         Patient education    6 MWT, 5x STS, foto Gym program Discharge, PT POC, HEP 15 min                                       Gait Training            TM 5 min total 1 0-1 5 mph, 3% incline  TM 5 min total 1 0-1 5 mph, 3% incline                      Modalities         CP               Precautions:   Past Medical History:   Diagnosis Date   • 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

## 2023-06-02 ENCOUNTER — LAB (OUTPATIENT)
Dept: LAB | Facility: HOSPITAL | Age: 83
End: 2023-06-02
Payer: COMMERCIAL

## 2023-06-02 DIAGNOSIS — I25.10 CORONARY ARTERY DISEASE INVOLVING NATIVE CORONARY ARTERY OF NATIVE HEART WITHOUT ANGINA PECTORIS: ICD-10-CM

## 2023-06-02 DIAGNOSIS — I10 ESSENTIAL HYPERTENSION: ICD-10-CM

## 2023-06-02 DIAGNOSIS — Z95.1 H/O CORONARY ARTERY BYPASS SURGERY: ICD-10-CM

## 2023-06-02 DIAGNOSIS — F41.9 ANXIETY: ICD-10-CM

## 2023-06-02 DIAGNOSIS — M48.061 SPINAL STENOSIS OF LUMBAR REGION, UNSPECIFIED WHETHER NEUROGENIC CLAUDICATION PRESENT: ICD-10-CM

## 2023-06-02 DIAGNOSIS — Z00.00 MEDICARE ANNUAL WELLNESS VISIT, SUBSEQUENT: ICD-10-CM

## 2023-06-02 DIAGNOSIS — E78.2 MIXED HYPERLIPIDEMIA: ICD-10-CM

## 2023-06-02 LAB
ALBUMIN SERPL BCP-MCNC: 3.8 G/DL (ref 3.5–5)
ALP SERPL-CCNC: 70 U/L (ref 34–104)
ALT SERPL W P-5'-P-CCNC: 16 U/L (ref 7–52)
ANION GAP SERPL CALCULATED.3IONS-SCNC: 6 MMOL/L (ref 4–13)
AST SERPL W P-5'-P-CCNC: 18 U/L (ref 13–39)
BILIRUB SERPL-MCNC: 0.49 MG/DL (ref 0.2–1)
BUN SERPL-MCNC: 17 MG/DL (ref 5–25)
CALCIUM SERPL-MCNC: 9.1 MG/DL (ref 8.4–10.2)
CHLORIDE SERPL-SCNC: 102 MMOL/L (ref 96–108)
CHOLEST SERPL-MCNC: 146 MG/DL
CO2 SERPL-SCNC: 26 MMOL/L (ref 21–32)
CREAT SERPL-MCNC: 0.87 MG/DL (ref 0.6–1.3)
ERYTHROCYTE [DISTWIDTH] IN BLOOD BY AUTOMATED COUNT: 12.9 % (ref 11.6–15.1)
GFR SERPL CREATININE-BSD FRML MDRD: 81 ML/MIN/1.73SQ M
GLUCOSE P FAST SERPL-MCNC: 93 MG/DL (ref 65–99)
HCT VFR BLD AUTO: 40.6 % (ref 36.5–49.3)
HDLC SERPL-MCNC: 53 MG/DL
HGB BLD-MCNC: 13.5 G/DL (ref 12–17)
LDLC SERPL CALC-MCNC: 75 MG/DL (ref 0–100)
MCH RBC QN AUTO: 33 PG (ref 26.8–34.3)
MCHC RBC AUTO-ENTMCNC: 33.3 G/DL (ref 31.4–37.4)
MCV RBC AUTO: 99 FL (ref 82–98)
PLATELET # BLD AUTO: 189 THOUSANDS/UL (ref 149–390)
PMV BLD AUTO: 9.6 FL (ref 8.9–12.7)
POTASSIUM SERPL-SCNC: 4.5 MMOL/L (ref 3.5–5.3)
PROT SERPL-MCNC: 6.5 G/DL (ref 6.4–8.4)
RBC # BLD AUTO: 4.09 MILLION/UL (ref 3.88–5.62)
SODIUM SERPL-SCNC: 134 MMOL/L (ref 135–147)
TRIGL SERPL-MCNC: 92 MG/DL
WBC # BLD AUTO: 6.94 THOUSAND/UL (ref 4.31–10.16)

## 2023-06-02 PROCEDURE — 85027 COMPLETE CBC AUTOMATED: CPT

## 2023-06-02 PROCEDURE — 36415 COLL VENOUS BLD VENIPUNCTURE: CPT

## 2023-06-02 PROCEDURE — 80053 COMPREHEN METABOLIC PANEL: CPT

## 2023-06-02 PROCEDURE — 80061 LIPID PANEL: CPT

## 2023-06-10 DIAGNOSIS — F41.9 ANXIETY: ICD-10-CM

## 2023-06-21 ENCOUNTER — OFFICE VISIT (OUTPATIENT)
Dept: FAMILY MEDICINE CLINIC | Facility: CLINIC | Age: 83
End: 2023-06-21
Payer: COMMERCIAL

## 2023-06-21 VITALS
DIASTOLIC BLOOD PRESSURE: 84 MMHG | BODY MASS INDEX: 28.15 KG/M2 | TEMPERATURE: 98.4 F | WEIGHT: 164 LBS | RESPIRATION RATE: 18 BRPM | SYSTOLIC BLOOD PRESSURE: 134 MMHG | HEART RATE: 80 BPM

## 2023-06-21 DIAGNOSIS — I10 ESSENTIAL HYPERTENSION: Primary | ICD-10-CM

## 2023-06-21 DIAGNOSIS — R13.10 DYSPHAGIA, UNSPECIFIED TYPE: ICD-10-CM

## 2023-06-21 DIAGNOSIS — I25.10 CORONARY ARTERY DISEASE INVOLVING NATIVE CORONARY ARTERY OF NATIVE HEART WITHOUT ANGINA PECTORIS: ICD-10-CM

## 2023-06-21 DIAGNOSIS — E78.2 MIXED HYPERLIPIDEMIA: ICD-10-CM

## 2023-06-21 DIAGNOSIS — Z12.5 SCREENING FOR PROSTATE CANCER: ICD-10-CM

## 2023-06-21 PROCEDURE — 99214 OFFICE O/P EST MOD 30 MIN: CPT | Performed by: FAMILY MEDICINE

## 2023-06-21 RX ORDER — ESOMEPRAZOLE MAGNESIUM 40 MG/1
40 CAPSULE, DELAYED RELEASE ORAL DAILY
Qty: 90 CAPSULE | Refills: 1 | Status: SHIPPED | OUTPATIENT
Start: 2023-06-21

## 2023-06-21 NOTE — PROGRESS NOTES
Assessment/Plan:    1  Essential hypertension  -     CBC; Future; Expected date: 12/03/2023    2  Coronary artery disease involving native coronary artery of native heart without angina pectoris  -     CBC; Future; Expected date: 12/03/2023    3  Mixed hyperlipidemia  -     CBC; Future; Expected date: 12/03/2023  -     Comprehensive metabolic panel; Future; Expected date: 12/03/2023  -     Lipid Panel with Direct LDL reflex; Future; Expected date: 12/03/2023    4  Dysphagia, unspecified type  -     FL barium swallow ROUTINE esophagus; Future; Expected date: 06/21/2023  -     esomeprazole (NexIUM) 40 MG capsule; Take 1 capsule (40 mg total) by mouth in the morning  -     CBC; Future; Expected date: 12/03/2023    5  Screening for prostate cancer  -     CBC; Future; Expected date: 12/03/2023  -     PSA, Total Screen; Future            There are no Patient Instructions on file for this visit  No follow-ups on file  Subjective:      Patient ID: Danice Cogan is a [de-identified] y o  male  Chief Complaint   Patient presents with   • Follow-up   • Hypertension     Sas/cma       Pt is here for a follow up appt  PT had labs    Pt states he wants to know why he coughs - states if he swallows , generally liquid will go down his windpipe, this has been happening for years, father had it as well      The following portions of the patient's history were reviewed and updated as appropriate: allergies, current medications, past family history, past medical history, past social history, past surgical history and problem list     Review of Systems   Constitutional: Negative for activity change, appetite change, chills, diaphoresis, fatigue, fever and unexpected weight change  HENT: Negative for congestion, dental problem, ear pain, mouth sores, sinus pressure, sinus pain, sore throat and trouble swallowing  Eyes: Negative for photophobia, discharge and itching     Respiratory: Negative for apnea, chest tightness and shortness of breath  Cardiovascular: Negative for chest pain, palpitations and leg swelling  Gastrointestinal: Negative for abdominal distention, abdominal pain, blood in stool, nausea and vomiting  Endocrine: Negative for cold intolerance, heat intolerance, polydipsia, polyphagia and polyuria  Genitourinary: Negative for difficulty urinating  Musculoskeletal: Negative for arthralgias  Skin: Negative for color change and wound  Neurological: Negative for dizziness, syncope, speech difficulty and headaches  Hematological: Negative for adenopathy  Psychiatric/Behavioral: Negative for agitation and behavioral problems  Current Outpatient Medications   Medication Sig Dispense Refill   • amLODIPine (NORVASC) 2 5 mg tablet Take 1 tablet (2 5 mg total) by mouth daily 90 tablet 1   • ASPIRIN 81 PO Take by mouth     • atorvastatin (LIPITOR) 80 mg tablet TAKE ONE TABLET BY MOUTH EVERY DAY 90 tablet 1   • Cholecalciferol 50 MCG (2000 UT) TABS Take 1,000 Units by mouth daily     • esomeprazole (NexIUM) 40 MG capsule Take 1 capsule (40 mg total) by mouth in the morning 90 capsule 1   • Loperamide HCl (IMODIUM PO) Take 0 5 tablets by mouth as needed     • LOSARTAN POTASSIUM PO Take by mouth     • metoprolol succinate (TOPROL-XL) 25 mg 24 hr tablet Take 1 tablet (25 mg total) by mouth daily 90 tablet 1   • Multiple Vitamins-Minerals (PRESERVISION AREDS 2 PO) Take by mouth     • niacin 500 mg tablet 2 (two) times a day     • nitroglycerin (NITROLINGUAL) 0 4 mg/spray spray Place 1 spray under the tongue every 5 (five) minutes as needed for chest pain 4 9 g 1   • omeprazole (PriLOSEC OTC) 20 MG tablet Take by mouth Once daily as needed     • sertraline (ZOLOFT) 50 mg tablet TAKE ONE AND ONE-HALF TABLETS BY MOUTH EVERY  tablet 1     No current facility-administered medications for this visit         Objective:    /84   Pulse 80   Temp 98 4 °F (36 9 °C)   Resp 18   Wt 74 4 kg (164 lb)   BMI 28 15 kg/m² Physical Exam  Vitals and nursing note reviewed  Constitutional:       General: He is not in acute distress  Appearance: He is well-developed  He is not diaphoretic  HENT:      Head: Normocephalic and atraumatic  Right Ear: External ear normal       Left Ear: External ear normal       Nose: Nose normal       Mouth/Throat:      Pharynx: No oropharyngeal exudate  Eyes:      General: No scleral icterus  Right eye: No discharge  Left eye: No discharge  Pupils: Pupils are equal, round, and reactive to light  Neck:      Thyroid: No thyromegaly  Cardiovascular:      Rate and Rhythm: Normal rate  Heart sounds: Normal heart sounds  No murmur heard  Pulmonary:      Effort: Pulmonary effort is normal  No respiratory distress  Breath sounds: Normal breath sounds  No wheezing  Abdominal:      General: Bowel sounds are normal  There is no distension  Palpations: Abdomen is soft  There is no mass  Tenderness: There is no abdominal tenderness  There is no guarding or rebound  Musculoskeletal:         General: Normal range of motion  Skin:     General: Skin is warm and dry  Findings: No erythema or rash  Neurological:      Mental Status: He is alert        Coordination: Coordination normal       Deep Tendon Reflexes: Reflexes normal    Psychiatric:         Behavior: Behavior normal               Recent Results (from the past 672 hour(s))   CBC    Collection Time: 06/02/23  9:52 AM   Result Value Ref Range    WBC 6 94 4 31 - 10 16 Thousand/uL    RBC 4 09 3 88 - 5 62 Million/uL    Hemoglobin 13 5 12 0 - 17 0 g/dL    Hematocrit 40 6 36 5 - 49 3 %    MCV 99 (H) 82 - 98 fL    MCH 33 0 26 8 - 34 3 pg    MCHC 33 3 31 4 - 37 4 g/dL    RDW 12 9 11 6 - 15 1 %    Platelets 377 265 - 724 Thousands/uL    MPV 9 6 8 9 - 12 7 fL   Comprehensive metabolic panel    Collection Time: 06/02/23  9:52 AM   Result Value Ref Range    Sodium 134 (L) 135 - 147 mmol/L    Potassium 4 5 3 5 - 5 3 mmol/L    Chloride 102 96 - 108 mmol/L    CO2 26 21 - 32 mmol/L    ANION GAP 6 4 - 13 mmol/L    BUN 17 5 - 25 mg/dL    Creatinine 0 87 0 60 - 1 30 mg/dL    Glucose, Fasting 93 65 - 99 mg/dL    Calcium 9 1 8 4 - 10 2 mg/dL    AST 18 13 - 39 U/L    ALT 16 7 - 52 U/L    Alkaline Phosphatase 70 34 - 104 U/L    Total Protein 6 5 6 4 - 8 4 g/dL    Albumin 3 8 3 5 - 5 0 g/dL    Total Bilirubin 0 49 0 20 - 1 00 mg/dL    eGFR 81 ml/min/1 73sq m   Lipid Panel with Direct LDL reflex    Collection Time: 06/02/23  9:52 AM   Result Value Ref Range    Cholesterol 146 See Comment mg/dL    Triglycerides 92 See Comment mg/dL    HDL, Direct 53 >=40 mg/dL    LDL Calculated 75 0 - 100 mg/dL         Miguel A Orr DO

## 2023-06-27 ENCOUNTER — HOSPITAL ENCOUNTER (OUTPATIENT)
Dept: RADIOLOGY | Facility: HOSPITAL | Age: 83
Discharge: HOME | End: 2023-06-27
Attending: FAMILY MEDICINE
Payer: COMMERCIAL

## 2023-06-27 DIAGNOSIS — R13.10 DYSPHAGIA, UNSPECIFIED TYPE: ICD-10-CM

## 2023-06-27 PROCEDURE — 74220 X-RAY XM ESOPHAGUS 1CNTRST: CPT

## 2023-06-28 ENCOUNTER — TELEPHONE (OUTPATIENT)
Dept: FAMILY MEDICINE CLINIC | Facility: CLINIC | Age: 83
End: 2023-06-28

## 2023-06-28 NOTE — TELEPHONE ENCOUNTER
No signs of obstruction seen on the esophageal study - but was seen was sig reflux  He was started on a proton pump inhibitor at the visist - that should actually be helping with his swallowing symptoms    Please  Call and see how he is doing and let him know

## 2023-07-02 DIAGNOSIS — I10 ESSENTIAL HYPERTENSION: ICD-10-CM

## 2023-07-03 RX ORDER — AMLODIPINE BESYLATE 2.5 MG/1
TABLET ORAL
Qty: 90 TABLET | Refills: 1 | Status: SHIPPED | OUTPATIENT
Start: 2023-07-03

## 2023-07-10 ENCOUNTER — TELEPHONE (OUTPATIENT)
Dept: FAMILY MEDICINE CLINIC | Facility: CLINIC | Age: 83
End: 2023-07-10

## 2023-07-10 DIAGNOSIS — R41.89 COGNITIVE DECLINE: Primary | ICD-10-CM

## 2023-07-10 DIAGNOSIS — R13.10 DYSPHAGIA, UNSPECIFIED TYPE: ICD-10-CM

## 2023-07-10 NOTE — TELEPHONE ENCOUNTER
I am not sure what the question is specifically. I am not sure if [facility] has any trials on alzheimers meds. He does not carryu this dx so I assume the first step would be to be evaluated by neurology to obtain a dx and then I suppose they are writing that med?

## 2023-07-10 NOTE — TELEPHONE ENCOUNTER
----- Message from Christian Brown sent at 7/10/2023  9:04 AM EDT -----  Regarding: FW: Ki Held  Contact: [phone]    ----- Message -----  From: Gladys Nelly  Sent: 7/9/2023   2:16 PM EDT  To: [address] [place] [address] Clinical  Subject: Ki Caputo                                    I have contacted [facility] who ran the clinical trial for early onset Alzheimer's to inquire about   obtaining a prescription for Leqembi. CMS seems to require the prescriber  to report to them through a registry. Can you or any physician at [facility] help her with this?     Paul Lakhani

## 2023-07-11 NOTE — TELEPHONE ENCOUNTER
Alma Arias states below message needs to be addressed. Lefty Smith Yesterday (10:49 AM)     Kirtirichie Dwight left a message, requesting a call back. The Nexium has not helped him. He wants to know if he should see GI?  He states that he had the Barium Swallow and Dr Tatiana Velásquez told him that if the Nexium did not help him, he would refer him to GI. [email or web address]         Note         Parish Whittaker [phone]  Winnie Barrios

## 2023-07-13 DIAGNOSIS — I25.10 CORONARY ARTERY DISEASE INVOLVING NATIVE CORONARY ARTERY OF NATIVE HEART WITHOUT ANGINA PECTORIS: ICD-10-CM

## 2023-07-13 DIAGNOSIS — R00.2 PALPITATIONS: ICD-10-CM

## 2023-07-13 RX ORDER — METOPROLOL SUCCINATE 25 MG/1
TABLET, EXTENDED RELEASE ORAL
Qty: 90 TABLET | Refills: 1 | Status: SHIPPED | OUTPATIENT
Start: 2023-07-13 | End: 2023-07-17

## 2023-07-14 ENCOUNTER — OFFICE VISIT (OUTPATIENT)
Dept: GASTROENTEROLOGY | Facility: CLINIC | Age: 83
End: 2023-07-14
Payer: COMMERCIAL

## 2023-07-14 ENCOUNTER — HOSPITAL ENCOUNTER (OUTPATIENT)
Dept: RADIOLOGY | Facility: HOSPITAL | Age: 83
Discharge: HOME | End: 2023-07-14
Payer: COMMERCIAL

## 2023-07-14 VITALS
BODY MASS INDEX: 27.66 KG/M2 | WEIGHT: 162 LBS | HEART RATE: 76 BPM | OXYGEN SATURATION: 97 % | HEIGHT: 64 IN | SYSTOLIC BLOOD PRESSURE: 118 MMHG | DIASTOLIC BLOOD PRESSURE: 76 MMHG

## 2023-07-14 DIAGNOSIS — R13.12 OROPHARYNGEAL DYSPHAGIA: ICD-10-CM

## 2023-07-14 DIAGNOSIS — Z12.11 COLON CANCER SCREENING: ICD-10-CM

## 2023-07-14 DIAGNOSIS — K21.9 GASTROESOPHAGEAL REFLUX DISEASE WITHOUT ESOPHAGITIS: ICD-10-CM

## 2023-07-14 DIAGNOSIS — R13.10 DYSPHAGIA, UNSPECIFIED TYPE: ICD-10-CM

## 2023-07-14 DIAGNOSIS — R05.3 CHRONIC COUGH: Primary | ICD-10-CM

## 2023-07-14 DIAGNOSIS — R05.3 CHRONIC COUGH: ICD-10-CM

## 2023-07-14 PROCEDURE — 99203 OFFICE O/P NEW LOW 30 MIN: CPT | Performed by: INTERNAL MEDICINE

## 2023-07-14 PROCEDURE — 71046 X-RAY EXAM CHEST 2 VIEWS: CPT

## 2023-07-14 NOTE — PROGRESS NOTES
Answers for HPI/ROS submitted by the patient on 7/12/2023  Progression since onset: resolved  anorexia: No  arthralgias: No  belching: No  constipation: No  diarrhea: No  dysuria: No  fever: No  flatus: No  frequency: No  headaches: No  hematochezia: No  hematuria: No  melena: No  myalgias: No  nausea: No  weight loss: No  vomiting: No  Aggravated by: nothing    [facility] - Outpatient Follow-up Note  Loida Horner 82 y.o. male MRN: [id number]  Encounter: [id number]          ASSESSMENT AND PLAN:      1. Dysphagia, unspecified type  Usually to clear liquids this is a longstanding occasionally coughs with drinking water. Barium swallow showed gastroesophageal reflux of the cervical esophagus no obstructing lesions no obvious aspiration. Had an EGD many years ago elsewhere. Took Prilosec for a while. Recently started on Nexium.  - Ambulatory Referral to Gastroenterology  - FL barium swallow video w speech; Future    2. Chronic cough  Has a persistent cough for the past 2 weeks no fevers chills chest pain. Some coarse breath sounds left lower base possibly atelectasis. No rubs heard. Cannot exclude bronchitis without fever. - XR chest pa & lateral; Future    3. Colon cancer screening  Followed in [place] no polyps    4. Oropharyngeal dysphagia  As above. He will otherwise follow-up in a month  - FL barium swallow video w speech; Future    ______________________________________________________________________    SUBJECTIVE: Very pleasant 82-year-old gentleman presents with chronic cough which started 2 weeks ago. Has had a barium swallow that showed gastroesophageal reflux to the cervical esophagus no obstructing lesions. He has a long history of occasional coughing with swallowing liquids never had video barium swallow. He denies any fevers chills night sweats hematemesis. No chest pain. Smoked several years but stopped in his 76s. Was recently started on Nexium 40 mg a day. Gets his colon cancer screening elsewhere never had polyps. There is no family history of colon or gastrointestinal neoplasias, celiac disease, hepatitis pancreatitis inflammatory bowel disease or peptic ulcer disease. His father had Duffy's esophagus. Patient had an upper endoscopy many years ago. REVIEW OF SYSTEMS IS OTHERWISE NEGATIVE. 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     Past Surgical History:   Procedure Laterality Date   • BRAIN SURGERY     • CORONARY ARTERY BYPASS GRAFT     • EPIDURAL BLOCK INJECTION N/A 2023    Procedure: BLOCK / INJECTION EPIDURAL STEROID LUMBAR L5-S1;  Surgeon: Jose C Bah DO;  Location: [address] [place] [address] MAIN OR;  Service: Pain Management    • LUMBAR EPIDURAL INJECTION N/A 2022    Procedure: L5 S1 LUMBAR epidural steroid injection ( 99408);   Surgeon: Jose C Bah DO;  Location: [facility] MAIN OR;  Service: Pain Management    • LUMBAR LAMINECTOMY  2023   • URINARY SURGERY       Social History   Social History     Substance and Sexual Activity   Alcohol Use Yes   • Alcohol/week: 14.0 standard drinks of alcohol   • Types: 14 Shots of liquor per week    Comment: 2 drinks of gin daily     Social History     Substance and Sexual Activity   Drug Use Never     Social History     Tobacco Use   Smoking Status Former   • Packs/day: 1.00   • Years: 12.00   • Total pack years: 12.00   • Types: Cigarettes   • Start date: 6/15/1957   • Quit date: 6/15/1969   • Years since quittin.1   Smokeless Tobacco Never     Family History   Problem Relation Age of Onset   • Alzheimer's disease Mother    • Heart disease Father    • Duffy's esophagus Father    • Mental illness Neg Hx        Meds/Allergies       Current Outpatient Medications:   •  amLODIPine (NORVASC) 2.5 mg tablet  •  ASPIRIN 81 PO  •  atorvastatin (LIPITOR) 80 mg tablet  •  Cholecalciferol 50 MCG (2000 UT) TABS  •  esomeprazole (NexIUM) 40 MG capsule  •  Loperamide HCl (IMODIUM PO)  •  LOSARTAN POTASSIUM PO  •  metoprolol succinate (TOPROL-XL) 25 mg 24 hr tablet  •  Multiple Vitamins-Minerals (PRESERVISION AREDS 2 PO)  •  niacin 500 mg tablet  •  nitroglycerin (NITROLINGUAL) 0.4 mg/spray spray  •  sertraline (ZOLOFT) 50 mg tablet    Allergies   Allergen Reactions   • Sulfa Antibiotics Irritability   • Erythromycin Other (See Comments)   • Sulfasalazine Other (See Comments)           Objective     Blood pressure 118/76, pulse 76, height 5' 4" (1.626 m), weight 73.5 kg (162 lb), SpO2 97 %. Body mass index is 27.81 kg/m². PHYSICAL EXAM:      General Appearance:   Alert, cooperative, no distress   HEENT:   Normocephalic, atraumatic, anicteric.     Neck:  Supple, symmetrical, trachea midline   Lungs:   Clear to auscultation bilaterally; no rales, rhonchi or wheezing; respirations unlabored    Heart[de-identified]   Regular rate and rhythm; no murmur, rub, or gallop. Abdomen:   Soft, non-tender, non-distended; normal bowel sounds; no masses, no organomegaly    Genitalia:   Deferred    Rectal:   Deferred    Extremities:  No cyanosis, clubbing or edema    Pulses:  2+ and symmetric    Skin:  No jaundice, rashes, or lesions    Lymph nodes:  No palpable cervical lymphadenopathy        Lab Results:   No visits with results within 1 Day(s) from this visit.    Latest known visit with results is:   Lab on 06/02/2023   Component Date Value   • WBC 06/02/2023 6.94    • RBC 06/02/2023 4.09    • Hemoglobin 06/02/2023 13.5    • Hematocrit 06/02/2023 40.6    • MCV 06/02/2023 99 (H)    • MCH 06/02/2023 33.0    • MCHC 06/02/2023 33.3    • RDW 06/02/2023 12.9    • Platelets 62/07/7794 189    • MPV 06/02/2023 9.6    • Sodium 06/02/2023 134 (L)    • Potassium 06/02/2023 4.5    • Chloride 06/02/2023 102    • CO2 06/02/2023 26    • ANION GAP 06/02/2023 6    • BUN 06/02/2023 17    • Creatinine 06/02/2023 0.87    • Glucose, Fasting 06/02/2023 93    • Calcium 06/02/2023 9.1    • AST 06/02/2023 18    • ALT 06/02/2023 16    • Alkaline Phosphatase 06/02/2023 70    • Total Protein 06/02/2023 6.5    • Albumin 06/02/2023 3.8    • Total Bilirubin 06/02/2023 0.49    • eGFR 06/02/2023 81    • Cholesterol 06/02/2023 146    • Triglycerides 06/02/2023 92    • HDL, Direct 06/02/2023 53    • LDL Calculated 06/02/2023 75          Radiology Results:   FL barium swallow ROUTINE esophagus    Result Date: 6/27/2023  Narrative: BARIUM SWALLOW-ESOPHAGRAM INDICATION:   R13.10: Dysphagia, unspecified. COMPARISON:  None IMAGES: 11 FLUOROSCOPY TIME:   1.7 MIN TECHNIQUE: The patient was given effervescent granules and barium by mouth and images of the esophagus were obtained. FINDINGS: The esophagus is within normal limits of caliber. Esophageal motility is unremarkable and emptying of contrast from the esophagus is prompt. No mucosal lesion, ulceration or evidence of fold thickening is seen. Significant spontaneous gastroesophageal reflux was observed to the level of the upper esophagus. There is no hiatal hernia. Impression: No evidence for esophageal stricture or obstruction. Significant spontaneous gastroesophageal reflux to the level of the upper esophagus.  Workstation performed: [id number]

## 2023-07-16 DIAGNOSIS — R00.2 PALPITATIONS: ICD-10-CM

## 2023-07-16 DIAGNOSIS — I25.10 CORONARY ARTERY DISEASE INVOLVING NATIVE CORONARY ARTERY OF NATIVE HEART WITHOUT ANGINA PECTORIS: ICD-10-CM

## 2023-07-17 RX ORDER — METOPROLOL SUCCINATE 25 MG/1
TABLET, EXTENDED RELEASE ORAL
Qty: 90 TABLET | Refills: 1 | Status: SHIPPED | OUTPATIENT
Start: 2023-07-17 | End: 2023-07-26 | Stop reason: SDUPTHER

## 2023-07-26 ENCOUNTER — OFFICE VISIT (OUTPATIENT)
Dept: CARDIOLOGY CLINIC | Facility: CLINIC | Age: 83
End: 2023-07-26
Payer: COMMERCIAL

## 2023-07-26 VITALS
HEART RATE: 78 BPM | WEIGHT: 162 LBS | HEIGHT: 64 IN | OXYGEN SATURATION: 97 % | DIASTOLIC BLOOD PRESSURE: 80 MMHG | SYSTOLIC BLOOD PRESSURE: 130 MMHG | BODY MASS INDEX: 27.66 KG/M2

## 2023-07-26 DIAGNOSIS — I49.1 PREMATURE ATRIAL CONTRACTIONS: ICD-10-CM

## 2023-07-26 DIAGNOSIS — Z95.1 H/O CORONARY ARTERY BYPASS SURGERY: ICD-10-CM

## 2023-07-26 DIAGNOSIS — Z87.898 H/O BRAIN TUMOR: ICD-10-CM

## 2023-07-26 DIAGNOSIS — I45.2 BIFASCICULAR BLOCK: ICD-10-CM

## 2023-07-26 DIAGNOSIS — I10 ESSENTIAL HYPERTENSION: ICD-10-CM

## 2023-07-26 DIAGNOSIS — I25.10 CORONARY ARTERY DISEASE INVOLVING NATIVE CORONARY ARTERY OF NATIVE HEART WITHOUT ANGINA PECTORIS: ICD-10-CM

## 2023-07-26 PROCEDURE — 93000 ELECTROCARDIOGRAM COMPLETE: CPT | Performed by: INTERNAL MEDICINE

## 2023-07-26 PROCEDURE — 99214 OFFICE O/P EST MOD 30 MIN: CPT | Performed by: INTERNAL MEDICINE

## 2023-07-26 RX ORDER — METOPROLOL SUCCINATE 50 MG/1
50 TABLET, EXTENDED RELEASE ORAL DAILY
Qty: 90 TABLET | Refills: 1 | Status: SHIPPED | OUTPATIENT
Start: 2023-07-26

## 2023-07-26 NOTE — PROGRESS NOTES
Progress note- Cardiology Office  [facility]. Paul Lakhani 82 y.o. male MRN: [id number]  : 1940  Unit/Bed#:  Encounter: [id number]      Assessment:     1. Coronary artery disease involving native coronary artery of native heart without angina pectoris    2. Essential hypertension    3. Bifascicular block    4. Premature atrial contractions    5. H/O coronary artery bypass surgery    6. H/O brain tumor        Discussion summary and Plan:    1. Coronary artery disease status post coronary bypass surgery with 2 vessel bypass in  in a [address] [facility] details not available will try to get records. He had a cardiac workup done in the form of stress test and echo Doppler in the last 1 year with cardiologist there. Those tests reviewed with him. His stress test was in  echo was in . No symptoms of angina    2. Essential hypertension. Patient blood pressure has improved with addition of amlodipine. His heart rate average is around 80 bpm.  He has frequent PACs will increase Toprol-XL to 50 mg daily. Advised him to cut back on caffeine. 3. Dyslipidemia with good statin he is on high intensity statin continue same medication. Triglycerides slightly elevated patient is well aware. Continue same statins. 4. Bifascicular block. Patient has history of bifascicular block. He has LVH. Today heart rate 78 bpm.  Holter monitor shows average heart rate 80 and he has frequent PACs they were around 25% of the total beats. Added metoprolol. Advised to decrease caffeine intake. 5. History of anxiety is the primary care provider for her wife. He is under a lot of stress he drinks caffeine advised him to cut back on his caffeine intake. Discussed with him again. 6. History of brain tumor which was benign has recovered well. 7..  Abnormal EKG with few PACs. Holter reviewed with him.       Patient  was advised and educated to call our office  immediately if patient has any new symptoms of chest pain/shortness of breath, near-syncope, syncope, light headedness sustained palpitations or any other cardiovascular symptoms before their scheduled follow-up appointment. Office number was provided #[phone]. Thank you for your consultation. If you have any question please call me at [phone]    Counseling :  A description of the counseling. Goals and Barriers. Patient's ability to self care: Yes  Medication side effect reviewed with patient in detail and all their questions answered to their satisfaction. Primary Care Physician: Chantelle Adam DO     HPI:     Chip Garcia is a 82y.o. year old male who was referred by primary care doctor for for multiple cardiac risk factors. Patient has medical history significant for coronary artery disease status post 2 vessel bypass in 1989 in [facility] [place], dyslipidemia, hypertension, bifascicular block, history of benign brain tumor who has moved to this area from [place] and came to see us. He used to follow with cardiologist there and has regular cardiac care. He believes he had a echo and stress test done within a year reports of which will be obtained from them. His EKG shows bifascicular block with LVH with repolarization abnormality we do not have old EKG to compare but patient was aware of it. He used to smoke he has quit smoking. He had a family history of heart disease. He moved to this area as his wife now having issues and he want to close his family. He has recently blood test done in September 2022 which has been acceptable. No nausea no vomiting no issues related to cardiac problem he is having issue with his back problem and is currently in pain. No other cardiovascular problem at this time. He smokes about pack a day for about 12 years he quit smoking wave before his surgery. No other recent surgery. He used to walk a lot.   Recently not walking that much due to back problems. Patient additional records from his previous cardiologist reviewed. 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 1/9/2023. Above reviewed. Patient came for follow-up. He has medical history significant for coronary artery disease s/p two-vessel bypass in 1989, dyslipidemia, hypertension, bifascicular block, history of benign brain tumor and back problems who came for follow-up. He is doing well from heart point of view but his blood pressure has been up and down. He used to smoke he has quit smoking. He has a cardiac work-up done before he moved to this area which has been acceptable which include echo as well as nuclear stress test since 2019. He smoked about 12 years since quit smoking for his surgery. No nausea no vomiting no fever no chills. He had a history of cerebellar hemangioblastoma s/p surgery in 1999 and history of transitional cell tumor of the bladder. History of mild aortic stenosis and cardiac murmur. No other issues at this time. .  Patient has EKG done at primary care doctor's office which was read as sinus with few PACs I could not see those EKG.    7/26/2023.    12 reviewed. Patient came for follow-up. Had history of coronary artery disease s/p two-vessel bypass in 1989, dyslipidemia, hypertension, bifascicular block, history of benign brain tumor s/p surgery. He has a Holter monitor done he has frequent PACs there were 23% of the total beats. His EF is acceptable. He drinks 4 to 6 cups of coffee a day. Does have history of cardiac murmur. He understand he need to cut back on his caffeine intake. His Holter reviewed with him blood pressure has improved. He used to smoke he is quit smoking now. No other cardiovascular issues his med list reviewed with him.   His last blood test was 6/2/2023 which shows cholesterol profile and electrolyte are stable. Review of Systems   Constitutional: Negative for activity change, chills, diaphoresis, fever and unexpected weight change. HENT: Negative for congestion. Eyes: Negative for discharge and redness. Respiratory: Negative for cough, chest tightness, shortness of breath and wheezing. Cardiovascular: Negative. Negative for chest pain, palpitations and leg swelling. He does not feel palpitations   Gastrointestinal: Negative for abdominal pain, diarrhea and nausea. Endocrine: Negative. Genitourinary: Negative for decreased urine volume and urgency. Musculoskeletal: Positive for arthralgias and back pain. Negative for gait problem. History of back   Skin: Negative for rash and wound. Allergic/Immunologic: Negative. Neurological: Negative for dizziness, seizures, syncope, weakness, light-headedness and headaches. Hematological: Negative. Psychiatric/Behavioral: Negative for agitation and confusion. The patient is nervous/anxious. Historical Information   Past Medical History:   Diagnosis Date   • Communicating hydrocephalus ([address]) 1/23/2012    Formatting of this note might be different from the original. Overview:  s/p ventriculostomy 1/12/99 1/23 lm   • Transitional cell carcinoma of bladder ([address]) 4/22/2009     Past Surgical History:   Procedure Laterality Date   • BRAIN SURGERY     • CORONARY ARTERY BYPASS GRAFT     • EPIDURAL BLOCK INJECTION N/A 01/04/2023    Procedure: BLOCK / INJECTION EPIDURAL STEROID LUMBAR L5-S1;  Surgeon: Shannan Weinstein DO;  Location: [address] [place] [address] MAIN OR;  Service: Pain Management    • LUMBAR EPIDURAL INJECTION N/A 11/09/2022    Procedure: L5 S1 LUMBAR epidural steroid injection ( 32542);   Surgeon: Shannan Weinstein DO;  Location: [facility] MAIN OR;  Service: Pain Management    • LUMBAR LAMINECTOMY  04/2023   • URINARY SURGERY       Social History     Substance and Sexual Activity   Alcohol Use Yes   • Alcohol/week: 14.0 standard drinks of alcohol   • Types: 14 Shots of liquor per week    Comment: 2 drinks of gin daily     Social History     Substance and Sexual Activity   Drug Use Never     Social History     Tobacco Use   Smoking Status Former   • Packs/day: 1.00   • Years: 12.00   • Total pack years: 12.00   • Types: Cigarettes   • Start date: 6/15/1957   • Quit date: 6/15/1969   • Years since quittin.1   Smokeless Tobacco Never     Family History:   Family History   Problem Relation Age of Onset   • Alzheimer's disease Mother    • Heart disease Father    • Duffy's esophagus Father    • Mental illness Neg Hx        Meds/Allergies     Allergies   Allergen Reactions   • Sulfa Antibiotics Irritability   • Erythromycin Other (See Comments)   • Sulfasalazine Other (See Comments)       Current Outpatient Medications:   •  amLODIPine (NORVASC) 2.5 mg tablet, TAKE ONE TABLET BY MOUTH EVERY DAY, Disp: 90 tablet, Rfl: 1  •  ASPIRIN 81 PO, Take by mouth, Disp: , Rfl:   •  atorvastatin (LIPITOR) 80 mg tablet, TAKE ONE TABLET BY MOUTH EVERY DAY, Disp: 90 tablet, Rfl: 1  •  Cholecalciferol 50 MCG (2000 UT) TABS, Take 1,000 Units by mouth daily, Disp: , Rfl:   •  esomeprazole (NexIUM) 40 MG capsule, Take 1 capsule (40 mg total) by mouth in the morning, Disp: 90 capsule, Rfl: 1  •  Loperamide HCl (IMODIUM PO), Take 0.5 tablets by mouth as needed, Disp: , Rfl:   •  LOSARTAN POTASSIUM PO, Take by mouth, Disp: , Rfl:   •  metoprolol succinate (TOPROL-XL) 50 mg 24 hr tablet, Take 1 tablet (50 mg total) by mouth daily, Disp: 90 tablet, Rfl: 1  •  Multiple Vitamins-Minerals (PRESERVISION AREDS 2 PO), Take by mouth, Disp: , Rfl:   •  niacin 500 mg tablet, 2 (two) times a day, Disp: , Rfl:   •  sertraline (ZOLOFT) 50 mg tablet, TAKE ONE AND ONE-HALF TABLETS BY MOUTH EVERY DAY, Disp: 135 tablet, Rfl: 1  •  nitroglycerin (NITROLINGUAL) 0.4 mg/spray spray, Place 1 spray under the tongue every 5 (five) minutes as needed for chest pain (Patient not taking: Reported on 7/26/2023), Disp: 4.9 g, Rfl: 1    Vitals: Blood pressure 130/80, pulse 78, height 5' 4" (1.626 m), weight 73.5 kg (162 lb), SpO2 97 %. ?  Body mass index is 27.81 kg/m². Wt Readings from Last 3 Encounters:   07/26/23 73.5 kg (162 lb)   07/14/23 73.5 kg (162 lb)   06/21/23 74.4 kg (164 lb)     Vitals:    07/26/23 1108   Weight: 73.5 kg (162 lb)     BP Readings from Last 3 Encounters:   07/26/23 130/80   07/14/23 118/76   06/21/23 134/84         Physical Exam  Constitutional:       General: He is not in acute distress. Appearance: He is well-developed. He is not diaphoretic. Neck:      Thyroid: No thyromegaly. Vascular: No JVD. Trachea: No tracheal deviation. Cardiovascular:      Rate and Rhythm: Normal rate and regular rhythm. Heart sounds: S1 normal and S2 normal. Heart sounds not distant. Murmur heard. Systolic (ejection) murmur is present with a grade of 2/6. No friction rub. No gallop. No S3 or S4 sounds. Pulmonary:      Effort: Pulmonary effort is normal. No respiratory distress. Breath sounds: Normal breath sounds. No wheezing or rales. Chest:      Chest wall: No tenderness. Abdominal:      General: Bowel sounds are normal. There is no distension. Palpations: Abdomen is soft. Tenderness: There is no abdominal tenderness. Musculoskeletal:         General: No deformity. Cervical back: Neck supple. Skin:     General: Skin is warm and dry. Coloration: Skin is not pale. Findings: No rash. Neurological:      Mental Status: He is alert and oriented to person, place, and time.    Psychiatric:         Behavior: Behavior normal.         Judgment: Judgment normal.             Diagnostic Studies Review Cardio:    Echo Doppler done with his cardiologist on January 2022 shows EF is 50-55 percent, mild aortic stenosis, left ventricle normal size with hypokinesis of inferior wall, mild mitral regurgitation, mild AI mild AS and aortic root was mildly dilated but ascending aorta was normal in size. He has a normal Lexiscan on 07/02/2019. Holter monitor. Holter monitor done in January 2023 shows patient's average heart rate was 80 bpm.  PACs and PVCs noted PACs were 23% of the total beats. EKG:    Twelve lead EKG 09/28/2022 shows normal sinus rhythm RBBB with left anterior fascicular block. LVH with repolarization abnormality. Patient has bifascicular block. Patient is aware of it    EKG from his cardiologist done shows no significant change few PACs were noted. Twelve-lead EKG done on 7/26/2023 shows sinus rhythm with bifascicular block LVH with repolarization normality heart rate 78 bpm not much change from previous EKG. Lab Review   Lab Results   Component Value Date    WBC 6.94 06/02/2023    HGB 13.5 06/02/2023    HCT 40.6 06/02/2023    MCV 99 (H) 06/02/2023    RDW 12.9 06/02/2023     06/02/2023     BMP:  Lab Results   Component Value Date    SODIUM 134 (L) 06/02/2023    K 4.5 06/02/2023     06/02/2023    CO2 26 06/02/2023    BUN 17 06/02/2023    CREATININE 0.87 06/02/2023    GLUF 93 06/02/2023    CALCIUM 9.1 06/02/2023    CORRECTEDCA 9.0 09/27/2022    EGFR 81 06/02/2023     Troponins:    LFT:  Lab Results   Component Value Date    AST 18 06/02/2023    ALT 16 06/02/2023    ALKPHOS 70 06/02/2023    TP 6.5 06/02/2023    ALB 3.8 06/02/2023      Lipid Profile:   Lab Results   Component Value Date    CHOLESTEROL 146 06/02/2023    HDL 53 06/02/2023    LDLCALC 75 06/02/2023    TRIG 92 06/02/2023     Lab Results   Component Value Date    CHOLESTEROL 146 06/02/2023    CHOLESTEROL 184 09/27/2022         Dr. Clover Otoole MD [facility]      "This note was completed in part utilizing m-modal fluency direct voice recognition software.    Grammatical errors, random word insertion, spelling mistakes, and incomplete sentences may be an occasional consequence of the system secondary to software limitations, ambient noise and hardware issues. Please read the chart carefully and recognize, using context, where substitutions have occurred.   If you have any questions or concerns about the context, text or information contained within the body of this dictation, please contact myself, the provider, for further clarification."

## 2023-08-29 DIAGNOSIS — I10 ESSENTIAL HYPERTENSION: Primary | ICD-10-CM

## 2023-08-30 RX ORDER — LOSARTAN POTASSIUM 100 MG/1
100 TABLET ORAL DAILY
Qty: 90 TABLET | Refills: 1 | Status: SHIPPED | OUTPATIENT
Start: 2023-08-30

## 2023-09-12 ENCOUNTER — TELEPHONE (OUTPATIENT)
Dept: GASTROENTEROLOGY | Facility: CLINIC | Age: 83
End: 2023-09-12

## 2023-09-12 NOTE — TELEPHONE ENCOUNTER
Dr Samantha Patel , kerrii, I called pt to r/s his 4 wk f/u and he informed me that at this time he doesn't wish to r/s or have testing done. He did state he is on Nexium and is feeling better.  Thanks Sonido Araujo

## 2023-10-26 DIAGNOSIS — E78.2 MIXED HYPERLIPIDEMIA: ICD-10-CM

## 2023-10-26 DIAGNOSIS — I25.10 CORONARY ARTERY DISEASE INVOLVING NATIVE CORONARY ARTERY OF NATIVE HEART WITHOUT ANGINA PECTORIS: ICD-10-CM

## 2023-10-26 RX ORDER — ATORVASTATIN CALCIUM 80 MG/1
TABLET, FILM COATED ORAL
Qty: 90 TABLET | Refills: 1 | Status: SHIPPED | OUTPATIENT
Start: 2023-10-26

## 2023-12-06 DIAGNOSIS — R13.10 DYSPHAGIA, UNSPECIFIED TYPE: ICD-10-CM

## 2023-12-06 RX ORDER — ESOMEPRAZOLE MAGNESIUM 40 MG/1
40 CAPSULE, DELAYED RELEASE ORAL EVERY MORNING
Qty: 90 CAPSULE | Refills: 1 | Status: SHIPPED | OUTPATIENT
Start: 2023-12-06

## 2023-12-08 DIAGNOSIS — F41.9 ANXIETY: ICD-10-CM

## 2023-12-15 ENCOUNTER — LAB (OUTPATIENT)
Dept: LAB | Facility: HOSPITAL | Age: 83
End: 2023-12-15
Attending: FAMILY MEDICINE
Payer: COMMERCIAL

## 2023-12-15 DIAGNOSIS — I25.10 CORONARY ARTERY DISEASE INVOLVING NATIVE CORONARY ARTERY OF NATIVE HEART WITHOUT ANGINA PECTORIS: ICD-10-CM

## 2023-12-15 DIAGNOSIS — I10 ESSENTIAL HYPERTENSION: ICD-10-CM

## 2023-12-15 DIAGNOSIS — R13.10 DYSPHAGIA, UNSPECIFIED TYPE: ICD-10-CM

## 2023-12-15 DIAGNOSIS — Z12.5 SCREENING FOR PROSTATE CANCER: ICD-10-CM

## 2023-12-15 DIAGNOSIS — E78.2 MIXED HYPERLIPIDEMIA: ICD-10-CM

## 2023-12-15 LAB
ALBUMIN SERPL BCP-MCNC: 4.1 G/DL (ref 3.5–5)
ALP SERPL-CCNC: 66 U/L (ref 34–104)
ALT SERPL W P-5'-P-CCNC: 16 U/L (ref 7–52)
ANION GAP SERPL CALCULATED.3IONS-SCNC: 4 MMOL/L
AST SERPL W P-5'-P-CCNC: 19 U/L (ref 13–39)
BILIRUB SERPL-MCNC: 0.52 MG/DL (ref 0.2–1)
BUN SERPL-MCNC: 17 MG/DL (ref 5–25)
CALCIUM SERPL-MCNC: 9.3 MG/DL (ref 8.4–10.2)
CHLORIDE SERPL-SCNC: 101 MMOL/L (ref 96–108)
CHOLEST SERPL-MCNC: 158 MG/DL
CO2 SERPL-SCNC: 29 MMOL/L (ref 21–32)
CREAT SERPL-MCNC: 0.96 MG/DL (ref 0.6–1.3)
ERYTHROCYTE [DISTWIDTH] IN BLOOD BY AUTOMATED COUNT: 12.9 % (ref 11.6–15.1)
GFR SERPL CREATININE-BSD FRML MDRD: 74 ML/MIN/1.73SQ M
GLUCOSE P FAST SERPL-MCNC: 97 MG/DL (ref 65–99)
HCT VFR BLD AUTO: 40.7 % (ref 36.5–49.3)
HDLC SERPL-MCNC: 50 MG/DL
HGB BLD-MCNC: 13.5 G/DL (ref 12–17)
LDLC SERPL CALC-MCNC: 83 MG/DL (ref 0–100)
MCH RBC QN AUTO: 32.6 PG (ref 26.8–34.3)
MCHC RBC AUTO-ENTMCNC: 33.2 G/DL (ref 31.4–37.4)
MCV RBC AUTO: 98 FL (ref 82–98)
PLATELET # BLD AUTO: 207 THOUSANDS/UL (ref 149–390)
PMV BLD AUTO: 9.6 FL (ref 8.9–12.7)
POTASSIUM SERPL-SCNC: 5.1 MMOL/L (ref 3.5–5.3)
PROT SERPL-MCNC: 6.3 G/DL (ref 6.4–8.4)
PSA SERPL-MCNC: 0.14 NG/ML (ref 0–4)
RBC # BLD AUTO: 4.14 MILLION/UL (ref 3.88–5.62)
SODIUM SERPL-SCNC: 134 MMOL/L (ref 135–147)
TRIGL SERPL-MCNC: 125 MG/DL
WBC # BLD AUTO: 7.57 THOUSAND/UL (ref 4.31–10.16)

## 2023-12-15 PROCEDURE — 36415 COLL VENOUS BLD VENIPUNCTURE: CPT

## 2023-12-15 PROCEDURE — 85027 COMPLETE CBC AUTOMATED: CPT

## 2023-12-15 PROCEDURE — 80061 LIPID PANEL: CPT

## 2023-12-15 PROCEDURE — G0103 PSA SCREENING: HCPCS

## 2023-12-15 PROCEDURE — 80053 COMPREHEN METABOLIC PANEL: CPT

## 2023-12-26 ENCOUNTER — OFFICE VISIT (OUTPATIENT)
Dept: FAMILY MEDICINE CLINIC | Facility: CLINIC | Age: 83
End: 2023-12-26
Payer: COMMERCIAL

## 2023-12-26 VITALS
RESPIRATION RATE: 18 BRPM | BODY MASS INDEX: 28.31 KG/M2 | OXYGEN SATURATION: 99 % | DIASTOLIC BLOOD PRESSURE: 60 MMHG | TEMPERATURE: 98.6 F | WEIGHT: 165.8 LBS | HEIGHT: 64 IN | HEART RATE: 68 BPM | SYSTOLIC BLOOD PRESSURE: 124 MMHG

## 2023-12-26 DIAGNOSIS — E78.2 MIXED HYPERLIPIDEMIA: ICD-10-CM

## 2023-12-26 DIAGNOSIS — M81.0 AGE-RELATED OSTEOPOROSIS WITHOUT CURRENT PATHOLOGICAL FRACTURE: ICD-10-CM

## 2023-12-26 DIAGNOSIS — I10 ESSENTIAL HYPERTENSION: Primary | ICD-10-CM

## 2023-12-26 DIAGNOSIS — I25.10 CORONARY ARTERY DISEASE INVOLVING NATIVE CORONARY ARTERY OF NATIVE HEART WITHOUT ANGINA PECTORIS: ICD-10-CM

## 2023-12-26 PROCEDURE — 99214 OFFICE O/P EST MOD 30 MIN: CPT | Performed by: FAMILY MEDICINE

## 2023-12-26 NOTE — PROGRESS NOTES
Assessment/Plan:    1. Essential hypertension  Assessment & Plan:  stable    Orders:  -     Comprehensive metabolic panel; Future; Expected date: 06/08/2024  -     Lipid Panel with Direct LDL reflex; Future; Expected date: 06/08/2024  -     CBC; Future; Expected date: 06/08/2024    2. Coronary artery disease involving native coronary artery of native heart without angina pectoris  -     Comprehensive metabolic panel; Future; Expected date: 06/08/2024  -     Lipid Panel with Direct LDL reflex; Future; Expected date: 06/08/2024  -     CBC; Future; Expected date: 06/08/2024    3. Age-related osteoporosis without current pathological fracture    4. Mixed hyperlipidemia  -     Comprehensive metabolic panel; Future; Expected date: 06/08/2024  -     Lipid Panel with Direct LDL reflex; Future; Expected date: 06/08/2024  -     CBC; Future; Expected date: 06/08/2024    5. BMI 28.0-28.9,adult            Patient Instructions   Weight Management   AMBULATORY CARE:   Why it is important to manage your weight:  Being overweight increases your risk of health conditions such as heart disease, high blood pressure, type 2 diabetes, and certain types of cancer. It can also increase your risk for osteoarthritis, sleep apnea, and other respiratory problems. Aim for a slow, steady weight loss. Even a small amount of weight loss can lower your risk of health problems.  Risks of being overweight:  Extra weight can cause many health problems, including the following:  Diabetes (high blood sugar level)    High blood pressure or high cholesterol    Heart disease    Stroke    Gallbladder or liver disease    Cancer of the colon, breast, prostate, liver, or kidney    Sleep apnea    Arthritis or gout    Screening  is done to check for health conditions before you have signs or symptoms. If you are 35 to 70 years old, your blood sugar level may be checked every 3 years for signs of prediabetes or diabetes. Your healthcare provider will check your  blood pressure at each visit. High blood pressure can lead to a stroke or other problems. Your provider may check for signs of heart disease, cancer, or other health problems.  How to lose weight safely:  A safe and healthy way to lose weight is to eat fewer calories and get regular exercise.  You can lose up about 1 pound a week by decreasing the number of calories you eat by 500 calories each day. You can decrease calories by eating smaller portion sizes or by cutting out high-calorie foods. Read labels to find out how many calories are in the foods you eat.         You can also burn calories with exercise such as walking, swimming, or biking. You will be more likely to keep weight off if you make these changes part of your lifestyle. Exercise at least 30 minutes per day on most days of the week. You can also fit in more physical activity by taking the stairs instead of the elevator or parking farther away from stores. Ask your healthcare provider about the best exercise plan for you.       Healthy meal plan for weight management:  A healthy meal plan includes a variety of foods, contains fewer calories, and helps you stay healthy. A healthy meal plan includes the following:     Eat whole-grain foods more often.  A healthy meal plan should contain fiber. Fiber is the part of grains, fruits, and vegetables that is not broken down by your body. Whole-grain foods are healthy and provide extra fiber in your diet. Some examples of whole-grain foods are whole-wheat breads and pastas, oatmeal, brown rice, and bulgur.    Eat a variety of vegetables every day.  Include dark, leafy greens such as spinach, kale, sumit greens, and mustard greens. Eat yellow and orange vegetables such as carrots, sweet potatoes, and winter squash.     Eat a variety of fruits every day.  Choose fresh or canned fruit (canned in its own juice or light syrup) instead of juice. Fruit juice has very little or no fiber.    Eat low-fat dairy foods.   Drink fat-free (skim) milk or 1% milk. Eat fat-free yogurt and low-fat cottage cheese. Try low-fat cheeses such as mozzarella and other reduced-fat cheeses.    Choose meat and other protein foods that are low in fat.  Choose beans or other legumes such as split peas or lentils. Choose fish, skinless poultry (chicken or turkey), or lean cuts of red meat (beef or pork). Before you cook meat or poultry, cut off any visible fat.     Use less fat and oil.  Try baking foods instead of frying them. Add less fat, such as margarine, sour cream, regular salad dressing and mayonnaise to foods. Eat fewer high-fat foods. Some examples of high-fat foods include french fries, doughnuts, ice cream, and cakes.    Eat fewer sweets.  Limit foods and drinks that are high in sugar. This includes candy, cookies, regular soda, and sweetened drinks.  Ways to decrease calories:   Eat smaller portions.     Use a small plate with smaller servings.    Do not eat second helpings.    When you eat at a restaurant, ask for a box and place half of your meal in the box before you eat.    Share an entrée with someone else.    Replace high-calorie snacks with healthy, low-calorie snacks.     Choose fresh fruit, vegetables, fat-free rice cakes, or air-popped popcorn instead of potato chips, nuts, or chocolate.    Choose water or calorie-free drinks instead of soda or sweetened drinks.    Do not shop for groceries when you are hungry.  You may be more likely to make unhealthy food choices. Take a grocery list of healthy foods and shop after you have eaten.    Eat regular meals. Do not skip meals. Skipping meals can lead to overeating later in the day. This can make it harder for you to lose weight. Eat a healthy snack in place of a meal if you do not have time to eat a regular meal. Talk with a dietitian to help you create a meal plan and schedule that is right for you.    Other things to consider as you try to lose weight:   Be aware of situations that  may give you the urge to overeat, such as eating while watching television. Find ways to avoid these situations. For example, read a book, go for a walk, or do crafts.    Meet with a weight loss support group or friends who are also trying to lose weight. This may help you stay motivated to continue working on your weight loss goals.    © Copyright [employer] 2023 Information is for End User's use only and may not be sold, redistributed or otherwise used for commercial purposes.  The above information is an  only. It is not intended as medical advice for individual conditions or treatments. Talk to your doctor, nurse or pharmacist before following any medical regimen to see if it is safe and effective for you.       Return for recheck and AWV - 30 min appt. .    Subjective:      Patient ID: Paul Lakhani is a 82 y.o. male.    Chief Complaint   Patient presents with   • Follow-up   • Blood Pressure Check     L.[place]/lpn       Pt is here for a 6 month follow up  Pt states he has a letter for neurology    Pt uis using prilosec OTC inseatd of Prescription nexium due to price          The following portions of the patient's history were reviewed and updated as appropriate: allergies, current medications, past family history, past medical history, past social history, past surgical history and problem list.    Review of Systems   Constitutional:  Negative for activity change, appetite change, chills, diaphoresis, fatigue, fever and unexpected weight change.   HENT:  Negative for congestion, dental problem, ear pain, mouth sores, sinus pressure, sinus pain, sore throat and trouble swallowing.    Eyes:  Negative for photophobia, discharge and itching.   Respiratory:  Negative for apnea, chest tightness and shortness of breath.    Cardiovascular:  Negative for chest pain, palpitations and leg swelling.   Gastrointestinal:  Negative for abdominal distention, abdominal pain, blood in stool, nausea and vomiting.  "  Endocrine: Negative for cold intolerance, heat intolerance, polydipsia, polyphagia and polyuria.   Genitourinary:  Negative for difficulty urinating.   Musculoskeletal:  Negative for arthralgias.   Skin:  Negative for color change and wound.   Neurological:  Negative for dizziness, syncope, speech difficulty and headaches.   Hematological:  Negative for adenopathy.   Psychiatric/Behavioral:  Negative for agitation and behavioral problems.          Current Outpatient Medications   Medication Sig Dispense Refill   • amLODIPine (NORVASC) 2.5 mg tablet TAKE ONE TABLET BY MOUTH EVERY DAY 90 tablet 1   • ASPIRIN 81 PO Take by mouth     • atorvastatin (LIPITOR) 80 mg tablet TAKE ONE TABLET BY MOUTH EVERY DAY 90 tablet 1   • Cholecalciferol 50 MCG (2000 UT) TABS Take 1,000 Units by mouth daily     • Loperamide HCl (IMODIUM PO) Take 0.5 tablets by mouth as needed     • losartan (COZAAR) 100 MG tablet Take 1 tablet (100 mg total) by mouth daily 90 tablet 1   • metoprolol succinate (TOPROL-XL) 50 mg 24 hr tablet Take 1 tablet (50 mg total) by mouth daily 90 tablet 1   • Multiple Vitamins-Minerals (PRESERVISION AREDS 2 PO) Take by mouth     • niacin 500 mg tablet 2 (two) times a day     • nitroglycerin (NITROLINGUAL) 0.4 mg/spray spray Place 1 spray under the tongue every 5 (five) minutes as needed for chest pain 4.9 g 1   • sertraline (ZOLOFT) 50 mg tablet TAKE 1 AND 1/2 TABLETS BY MOUTH EVERY  tablet 1     No current facility-administered medications for this visit.       Objective:    /60   Pulse 68   Temp 98.6 °F (37 °C) (Temporal)   Resp 18   Ht 5' 4\" (1.626 m)   Wt 75.2 kg (165 lb 12.8 oz)   SpO2 99%   BMI 28.46 kg/m²        Physical Exam  Vitals and nursing note reviewed.   Constitutional:       General: He is not in acute distress.     Appearance: He is well-developed. He is not diaphoretic.   HENT:      Head: Normocephalic and atraumatic.      Right Ear: External ear normal.      Left Ear: " External ear normal.      Nose: Nose normal.      Mouth/Throat:      Pharynx: No oropharyngeal exudate.   Eyes:      General: No scleral icterus.        Right eye: No discharge.         Left eye: No discharge.      Pupils: Pupils are equal, round, and reactive to light.   Neck:      Thyroid: No thyromegaly.   Cardiovascular:      Rate and Rhythm: Normal rate.      Heart sounds: Normal heart sounds. No murmur heard.  Pulmonary:      Effort: Pulmonary effort is normal. No respiratory distress.      Breath sounds: Normal breath sounds. No wheezing.   Abdominal:      General: Bowel sounds are normal. There is no distension.      Palpations: Abdomen is soft. There is no mass.      Tenderness: There is no abdominal tenderness. There is no guarding or rebound.   Musculoskeletal:         General: Normal range of motion.   Skin:     General: Skin is warm and dry.      Findings: No erythema or rash.   Neurological:      Mental Status: He is alert.      Coordination: Coordination normal.      Deep Tendon Reflexes: Reflexes normal.   Psychiatric:         Behavior: Behavior normal.                Frank Lombardi, DOBMI Counseling: Body mass index is 28.46 kg/m². The BMI is above normal. Nutrition recommendations include reducing portion sizes.

## 2023-12-26 NOTE — PATIENT INSTRUCTIONS
Weight Management   AMBULATORY CARE:   Why it is important to manage your weight:  Being overweight increases your risk of health conditions such as heart disease, high blood pressure, type 2 diabetes, and certain types of cancer. It can also increase your risk for osteoarthritis, sleep apnea, and other respiratory problems. Aim for a slow, steady weight loss. Even a small amount of weight loss can lower your risk of health problems.  Risks of being overweight:  Extra weight can cause many health problems, including the following:  Diabetes (high blood sugar level)    High blood pressure or high cholesterol    Heart disease    Stroke    Gallbladder or liver disease    Cancer of the colon, breast, prostate, liver, or kidney    Sleep apnea    Arthritis or gout    Screening  is done to check for health conditions before you have signs or symptoms. If you are 35 to 70 years old, your blood sugar level may be checked every 3 years for signs of prediabetes or diabetes. Your healthcare provider will check your blood pressure at each visit. High blood pressure can lead to a stroke or other problems. Your provider may check for signs of heart disease, cancer, or other health problems.  How to lose weight safely:  A safe and healthy way to lose weight is to eat fewer calories and get regular exercise.  You can lose up about 1 pound a week by decreasing the number of calories you eat by 500 calories each day. You can decrease calories by eating smaller portion sizes or by cutting out high-calorie foods. Read labels to find out how many calories are in the foods you eat.         You can also burn calories with exercise such as walking, swimming, or biking. You will be more likely to keep weight off if you make these changes part of your lifestyle. Exercise at least 30 minutes per day on most days of the week. You can also fit in more physical activity by taking the stairs instead of the elevator or parking farther away from  stores. Ask your healthcare provider about the best exercise plan for you.       Healthy meal plan for weight management:  A healthy meal plan includes a variety of foods, contains fewer calories, and helps you stay healthy. A healthy meal plan includes the following:     Eat whole-grain foods more often.  A healthy meal plan should contain fiber. Fiber is the part of grains, fruits, and vegetables that is not broken down by your body. Whole-grain foods are healthy and provide extra fiber in your diet. Some examples of whole-grain foods are whole-wheat breads and pastas, oatmeal, brown rice, and bulgur.    Eat a variety of vegetables every day.  Include dark, leafy greens such as spinach, kale, sumit greens, and mustard greens. Eat yellow and orange vegetables such as carrots, sweet potatoes, and winter squash.     Eat a variety of fruits every day.  Choose fresh or canned fruit (canned in its own juice or light syrup) instead of juice. Fruit juice has very little or no fiber.    Eat low-fat dairy foods.  Drink fat-free (skim) milk or 1% milk. Eat fat-free yogurt and low-fat cottage cheese. Try low-fat cheeses such as mozzarella and other reduced-fat cheeses.    Choose meat and other protein foods that are low in fat.  Choose beans or other legumes such as split peas or lentils. Choose fish, skinless poultry (chicken or turkey), or lean cuts of red meat (beef or pork). Before you cook meat or poultry, cut off any visible fat.     Use less fat and oil.  Try baking foods instead of frying them. Add less fat, such as margarine, sour cream, regular salad dressing and mayonnaise to foods. Eat fewer high-fat foods. Some examples of high-fat foods include french fries, doughnuts, ice cream, and cakes.    Eat fewer sweets.  Limit foods and drinks that are high in sugar. This includes candy, cookies, regular soda, and sweetened drinks.  Ways to decrease calories:   Eat smaller portions.     Use a small plate with smaller  servings.    Do not eat second helpings.    When you eat at a restaurant, ask for a box and place half of your meal in the box before you eat.    Share an entrée with someone else.    Replace high-calorie snacks with healthy, low-calorie snacks.     Choose fresh fruit, vegetables, fat-free rice cakes, or air-popped popcorn instead of potato chips, nuts, or chocolate.    Choose water or calorie-free drinks instead of soda or sweetened drinks.    Do not shop for groceries when you are hungry.  You may be more likely to make unhealthy food choices. Take a grocery list of healthy foods and shop after you have eaten.    Eat regular meals. Do not skip meals. Skipping meals can lead to overeating later in the day. This can make it harder for you to lose weight. Eat a healthy snack in place of a meal if you do not have time to eat a regular meal. Talk with a dietitian to help you create a meal plan and schedule that is right for you.    Other things to consider as you try to lose weight:   Be aware of situations that may give you the urge to overeat, such as eating while watching television. Find ways to avoid these situations. For example, read a book, go for a walk, or do crafts.    Meet with a weight loss support group or friends who are also trying to lose weight. This may help you stay motivated to continue working on your weight loss goals.    © Copyright [employer] 2023 Information is for End User's use only and may not be sold, redistributed or otherwise used for commercial purposes.  The above information is an  only. It is not intended as medical advice for individual conditions or treatments. Talk to your doctor, nurse or pharmacist before following any medical regimen to see if it is safe and effective for you.

## 2023-12-31 DIAGNOSIS — I10 ESSENTIAL HYPERTENSION: ICD-10-CM

## 2024-01-02 RX ORDER — AMLODIPINE BESYLATE 2.5 MG/1
TABLET ORAL
Qty: 90 TABLET | Refills: 1 | Status: SHIPPED | OUTPATIENT
Start: 2024-01-02

## 2024-01-19 DIAGNOSIS — I49.1 PREMATURE ATRIAL CONTRACTIONS: ICD-10-CM

## 2024-01-19 DIAGNOSIS — I25.10 CORONARY ARTERY DISEASE INVOLVING NATIVE CORONARY ARTERY OF NATIVE HEART WITHOUT ANGINA PECTORIS: ICD-10-CM

## 2024-01-22 RX ORDER — METOPROLOL SUCCINATE 50 MG/1
50 TABLET, EXTENDED RELEASE ORAL DAILY
Qty: 90 TABLET | Refills: 0 | Status: SHIPPED | OUTPATIENT
Start: 2024-01-22

## 2024-02-14 ENCOUNTER — OFFICE VISIT (OUTPATIENT)
Dept: CARDIOLOGY CLINIC | Facility: CLINIC | Age: 84
End: 2024-02-14
Payer: COMMERCIAL

## 2024-02-14 VITALS
OXYGEN SATURATION: 99 % | DIASTOLIC BLOOD PRESSURE: 80 MMHG | HEIGHT: 64 IN | SYSTOLIC BLOOD PRESSURE: 130 MMHG | HEART RATE: 66 BPM | BODY MASS INDEX: 28.34 KG/M2 | WEIGHT: 166 LBS

## 2024-02-14 DIAGNOSIS — I35.0 NONRHEUMATIC AORTIC VALVE STENOSIS: ICD-10-CM

## 2024-02-14 DIAGNOSIS — E78.2 MIXED HYPERLIPIDEMIA: ICD-10-CM

## 2024-02-14 DIAGNOSIS — Z95.1 S/P CABG X 2: ICD-10-CM

## 2024-02-14 DIAGNOSIS — I25.10 CORONARY ARTERY DISEASE INVOLVING NATIVE CORONARY ARTERY OF NATIVE HEART WITHOUT ANGINA PECTORIS: ICD-10-CM

## 2024-02-14 DIAGNOSIS — I10 ESSENTIAL HYPERTENSION: Primary | ICD-10-CM

## 2024-02-14 PROCEDURE — 99214 OFFICE O/P EST MOD 30 MIN: CPT | Performed by: NURSE PRACTITIONER

## 2024-02-14 NOTE — PROGRESS NOTES
Progress Note - Cardiology Office  [facility]    Paul Lakhani 83 y.o. male MRN: [id number]  : 1940  Encounter: [id number]      Assessment:     1. Essential hypertension    2. Coronary artery disease involving native coronary artery of native heart without angina pectoris    3. S/P CABG x 2    4. Mixed hyperlipidemia    5. Nonrheumatic aortic valve stenosis        Discussion Summary and Plan:  1. Hypertension: blood pressures acceptable    -   continue Cozaar 100 mg daily    -   continue Norvasc 2.5 mg once a day    2. Coronary artery disease with history of CABG x2: patient is asymptomatic    -   continue aspirin 81 mg daily    -   continue Lipitor 80 mg once a day    -   continue cardiovascular activity/walking    3. Dyslipidemia: 12/15/2023 lipid panel: total cholesterol 158, triglycerides 125, HDL 50 and LDL 83    -   continue Lipitor 80 mg once a day    4. Mild aortic stenosis: per echocardiogram     -   Monitor for advancing symptoms      Patient / Caretaker was advised and educated to call our office  immediately if  patient has any new symptoms of chest pain/shortness of breath, near-syncope, syncope, light headedness sustained palpitations  or any other cardiovascular symptoms before their scheduled follow-up appointment.  Office number was provided #[phone].    Please call [phone] if any questions.    Counseling :  A description of the counseling.  Goals and Barriers.  Patient's ability to self care: Yes  Medication side effect reviewed with patient in detail and all their questions answered to their satisfaction.    HPI :     Paul Lakhani is a 83 y.o. year old male who came for routine follow up. Patient has doing well since his last office visit with Dr. Alvarez. [place] the [place] and noted that he does not need to do any shoveling of snow. He is anticipating warmer weather so he can get out and do more walking. He denies any chest  pain, pressure or palpitations. He denies any shortness of breath, lightheadedness or dizziness. He denies any change in his ability to perform activities.    Patient has a past medical history significant for coronary artery disease for which she had to vessel bypass in 1989. Dyslipidemia, hypertension, by for secular block, history of benign brain tumor and mild aortic stenosis seen on recent echocardiogram. He is a former smoker, smoked one pack a day for about 12 years but quit many years ago.    Review of Systems   Constitutional: Negative.  Negative for activity change and fatigue.   HENT: Negative.  Negative for congestion, mouth sores and tinnitus.    Eyes: Negative.  Negative for photophobia and visual disturbance.   Respiratory: Negative.  Negative for chest tightness and shortness of breath.    Cardiovascular: Negative.  Negative for chest pain, palpitations and leg swelling.   Gastrointestinal: Negative.  Negative for abdominal distention, constipation, diarrhea, nausea and vomiting.   Endocrine: Negative.  Negative for polydipsia, polyphagia and polyuria.   Genitourinary: Negative.  Negative for difficulty urinating.   Musculoskeletal: Negative.    Skin: Negative.    Neurological: Negative.  Negative for dizziness, syncope, weakness and light-headedness.   Hematological: Negative.    Psychiatric/Behavioral: Negative.         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     Past Surgical History:   Procedure Laterality Date    BRAIN SURGERY      CORONARY ARTERY BYPASS GRAFT      EPIDURAL BLOCK INJECTION N/A 01/04/2023    Procedure: BLOCK / INJECTION EPIDURAL STEROID LUMBAR L5-S1;  Surgeon: Hussain Gonzalez DO;  Location: [facility] MAIN OR;  Service: Pain Management      LUMBAR EPIDURAL INJECTION N/A 2022    Procedure: L5 S1 LUMBAR epidural steroid injection ( 09857);  Surgeon: Hussain Gonzalez DO;  Location: [facility] MAIN OR;  Service: Pain Management     LUMBAR LAMINECTOMY  2023    URINARY SURGERY       Social History     Substance and Sexual Activity   Alcohol Use Yes    Alcohol/week: 14.0 standard drinks of alcohol    Types: 14 Shots of liquor per week    Comment: 2 drinks of gin daily     Social History     Substance and Sexual Activity   Drug Use Never     Social History     Tobacco Use   Smoking Status Former    Current packs/day: 0.00    Average packs/day: 1 pack/day for 12.0 years (12.0 ttl pk-yrs)    Types: Cigarettes    Start date: 6/15/1957    Quit date: 6/15/1969    Years since quittin.7   Smokeless Tobacco Never     Family History:   Family History   Problem Relation Age of Onset    Alzheimer's disease Mother     Heart disease Father     Duffy's esophagus Father     Mental illness Neg Hx        Meds/Allergies     Allergies   Allergen Reactions    Sulfa Antibiotics Irritability    Erythromycin Other (See Comments)    Sulfasalazine Other (See Comments)       Current Outpatient Medications:     amLODIPine (NORVASC) 2.5 mg tablet, TAKE ONE TABLET BY MOUTH EVERY DAY, Disp: 90 tablet, Rfl: 1    ASPIRIN 81 PO, Take by mouth, Disp: , Rfl:     atorvastatin (LIPITOR) 80 mg tablet, TAKE ONE TABLET BY MOUTH EVERY DAY, Disp: 90 tablet, Rfl: 1    Cholecalciferol 50 MCG (2000 UT) TABS, Take 1,000 Units by mouth daily, Disp: , Rfl:     Loperamide HCl (IMODIUM PO), Take 0.5 tablets by mouth as needed, Disp: , Rfl:     losartan (COZAAR) 100 MG tablet, Take 1 tablet (100 mg total) by mouth daily, Disp: 90 tablet, Rfl: 1    metoprolol succinate (TOPROL-XL) 50 mg 24 hr tablet, Take 1 tablet (50 mg total) by mouth daily, Disp: 90 tablet, Rfl: 0    Multiple Vitamins-Minerals (PRESERVISION AREDS 2 PO), Take by mouth, Disp: , Rfl:     niacin 500 mg tablet, 2 (two) times a  "day, Disp: , Rfl:     sertraline (ZOLOFT) 50 mg tablet, TAKE 1 AND 1/2 TABLETS BY MOUTH EVERY DAY, Disp: 135 tablet, Rfl: 1    nitroglycerin (NITROLINGUAL) 0.4 mg/spray spray, Place 1 spray under the tongue every 5 (five) minutes as needed for chest pain (Patient not taking: Reported on 2/14/2024), Disp: 4.9 g, Rfl: 1    Vitals: Blood pressure 130/80, pulse 66, height 5' 4\" (1.626 m), weight 75.3 kg (166 lb), SpO2 99%.    Body mass index is 28.49 kg/m².  Wt Readings from Last 3 Encounters:   02/14/24 75.3 kg (166 lb)   12/26/23 75.2 kg (165 lb 12.8 oz)   07/26/23 73.5 kg (162 lb)     Vitals:    02/14/24 1037   Weight: 75.3 kg (166 lb)     BP Readings from Last 3 Encounters:   02/14/24 130/80   12/26/23 124/60   07/26/23 130/80       Physical Exam:  Physical Exam  Vitals and nursing note reviewed.   Constitutional:       General: He is not in acute distress.     Appearance: Normal appearance. He is normal weight.   HENT:      Right Ear: External ear normal.      Left Ear: External ear normal.      Nose: Nose normal.   Eyes:      General:         Right eye: No discharge.         Left eye: No discharge.   Cardiovascular:      Rate and Rhythm: Normal rate and regular rhythm.      Pulses: Normal pulses.      Heart sounds: Murmur heard.      Harsh midsystolic murmur is present with a grade of 2/6 at the upper right sternal border radiating to the neck.   Pulmonary:      Effort: Pulmonary effort is normal. No respiratory distress.      Breath sounds: Normal breath sounds.   Abdominal:      General: Bowel sounds are normal. There is no distension.      Palpations: Abdomen is soft.   Musculoskeletal:      Right lower leg: No edema.      Left lower leg: No edema.   Skin:     General: Skin is warm and dry.      Capillary Refill: Capillary refill takes less than 2 seconds.   Neurological:      General: No focal deficit present.      Mental Status: He is alert and oriented to person, place, and time. Mental status is at " "baseline.   Psychiatric:         Mood and Affect: Mood normal.         Luba RIVAS  Cardiology        \"This note was completed in part utilizing Mult[employer] direct voice recognition software.   Grammatical errors, random word insertion, spelling mistakes, and incomplete sentences may be an occasional consequence of the system secondary to software limitations, ambient noise and hardware issues.    Please read the chart carefully and recognize, using context, where substitutions have occurred.  If you have any questions or concerns about the context, text or information contained within the body of this dictation, please contact myself, the provider, for further clarification.\"  "

## 2024-03-07 DIAGNOSIS — I10 ESSENTIAL HYPERTENSION: ICD-10-CM

## 2024-03-07 RX ORDER — LOSARTAN POTASSIUM 100 MG/1
100 TABLET ORAL DAILY
Qty: 90 TABLET | Refills: 1 | Status: SHIPPED | OUTPATIENT
Start: 2024-03-07

## 2024-04-26 DIAGNOSIS — E78.2 MIXED HYPERLIPIDEMIA: ICD-10-CM

## 2024-04-26 DIAGNOSIS — I25.10 CORONARY ARTERY DISEASE INVOLVING NATIVE CORONARY ARTERY OF NATIVE HEART WITHOUT ANGINA PECTORIS: ICD-10-CM

## 2024-04-26 RX ORDER — ATORVASTATIN CALCIUM 80 MG/1
TABLET, FILM COATED ORAL
Qty: 90 TABLET | Refills: 1 | Status: SHIPPED | OUTPATIENT
Start: 2024-04-26

## 2024-05-23 DIAGNOSIS — I49.1 PREMATURE ATRIAL CONTRACTIONS: ICD-10-CM

## 2024-05-23 DIAGNOSIS — I25.10 CORONARY ARTERY DISEASE INVOLVING NATIVE CORONARY ARTERY OF NATIVE HEART WITHOUT ANGINA PECTORIS: ICD-10-CM

## 2024-05-24 RX ORDER — METOPROLOL SUCCINATE 50 MG/1
50 TABLET, EXTENDED RELEASE ORAL DAILY
Qty: 90 TABLET | Refills: 1 | Status: SHIPPED | OUTPATIENT
Start: 2024-05-24

## 2024-06-18 DIAGNOSIS — F41.9 ANXIETY: ICD-10-CM

## 2024-06-21 ENCOUNTER — APPOINTMENT (OUTPATIENT)
Dept: LAB | Facility: HOSPITAL | Age: 84
End: 2024-06-21
Payer: COMMERCIAL

## 2024-06-21 DIAGNOSIS — I10 ESSENTIAL HYPERTENSION: ICD-10-CM

## 2024-06-21 DIAGNOSIS — I25.10 CORONARY ARTERY DISEASE INVOLVING NATIVE CORONARY ARTERY OF NATIVE HEART WITHOUT ANGINA PECTORIS: ICD-10-CM

## 2024-06-21 DIAGNOSIS — E78.2 MIXED HYPERLIPIDEMIA: ICD-10-CM

## 2024-06-21 LAB
ALBUMIN SERPL BCG-MCNC: 4.2 G/DL (ref 3.5–5)
ALP SERPL-CCNC: 57 U/L (ref 34–104)
ALT SERPL W P-5'-P-CCNC: 19 U/L (ref 7–52)
ANION GAP SERPL CALCULATED.3IONS-SCNC: 7 MMOL/L (ref 4–13)
AST SERPL W P-5'-P-CCNC: 20 U/L (ref 13–39)
BILIRUB SERPL-MCNC: 0.57 MG/DL (ref 0.2–1)
BUN SERPL-MCNC: 20 MG/DL (ref 5–25)
CALCIUM SERPL-MCNC: 9.5 MG/DL (ref 8.4–10.2)
CHLORIDE SERPL-SCNC: 100 MMOL/L (ref 96–108)
CHOLEST SERPL-MCNC: 171 MG/DL
CO2 SERPL-SCNC: 25 MMOL/L (ref 21–32)
CREAT SERPL-MCNC: 1.06 MG/DL (ref 0.6–1.3)
ERYTHROCYTE [DISTWIDTH] IN BLOOD BY AUTOMATED COUNT: 13.1 % (ref 11.6–15.1)
GFR SERPL CREATININE-BSD FRML MDRD: 65 ML/MIN/1.73SQ M
GLUCOSE P FAST SERPL-MCNC: 95 MG/DL (ref 65–99)
HCT VFR BLD AUTO: 42.2 % (ref 36.5–49.3)
HDLC SERPL-MCNC: 57 MG/DL
HGB BLD-MCNC: 14.1 G/DL (ref 12–17)
LDLC SERPL CALC-MCNC: 87 MG/DL (ref 0–100)
MCH RBC QN AUTO: 32.7 PG (ref 26.8–34.3)
MCHC RBC AUTO-ENTMCNC: 33.4 G/DL (ref 31.4–37.4)
MCV RBC AUTO: 98 FL (ref 82–98)
PLATELET # BLD AUTO: 225 THOUSANDS/UL (ref 149–390)
PMV BLD AUTO: 9.6 FL (ref 8.9–12.7)
POTASSIUM SERPL-SCNC: 4.4 MMOL/L (ref 3.5–5.3)
PROT SERPL-MCNC: 7 G/DL (ref 6.4–8.4)
RBC # BLD AUTO: 4.31 MILLION/UL (ref 3.88–5.62)
SODIUM SERPL-SCNC: 132 MMOL/L (ref 135–147)
TRIGL SERPL-MCNC: 137 MG/DL
WBC # BLD AUTO: 8.27 THOUSAND/UL (ref 4.31–10.16)

## 2024-06-21 PROCEDURE — 80053 COMPREHEN METABOLIC PANEL: CPT

## 2024-06-21 PROCEDURE — 85027 COMPLETE CBC AUTOMATED: CPT

## 2024-06-21 PROCEDURE — 36415 COLL VENOUS BLD VENIPUNCTURE: CPT

## 2024-06-21 PROCEDURE — 80061 LIPID PANEL: CPT

## 2024-06-27 ENCOUNTER — OFFICE VISIT (OUTPATIENT)
Dept: FAMILY MEDICINE CLINIC | Facility: CLINIC | Age: 84
End: 2024-06-27
Payer: COMMERCIAL

## 2024-06-27 VITALS
TEMPERATURE: 97.6 F | RESPIRATION RATE: 16 BRPM | SYSTOLIC BLOOD PRESSURE: 136 MMHG | DIASTOLIC BLOOD PRESSURE: 60 MMHG | WEIGHT: 166 LBS | HEIGHT: 64 IN | BODY MASS INDEX: 28.34 KG/M2 | HEART RATE: 62 BPM

## 2024-06-27 DIAGNOSIS — Z95.1 S/P CABG X 2: ICD-10-CM

## 2024-06-27 DIAGNOSIS — I25.10 CORONARY ARTERY DISEASE INVOLVING NATIVE CORONARY ARTERY OF NATIVE HEART WITHOUT ANGINA PECTORIS: ICD-10-CM

## 2024-06-27 DIAGNOSIS — Z00.00 MEDICARE ANNUAL WELLNESS VISIT, SUBSEQUENT: Primary | ICD-10-CM

## 2024-06-27 DIAGNOSIS — Z85.51 HX OF BLADDER CANCER: ICD-10-CM

## 2024-06-27 DIAGNOSIS — F32.0 MAJOR DEPRESSIVE DISORDER, SINGLE EPISODE, MILD (HCC): ICD-10-CM

## 2024-06-27 DIAGNOSIS — I77.810 AORTIC ROOT DILATATION (HCC): ICD-10-CM

## 2024-06-27 DIAGNOSIS — I10 ESSENTIAL HYPERTENSION: ICD-10-CM

## 2024-06-27 DIAGNOSIS — E78.2 MIXED HYPERLIPIDEMIA: ICD-10-CM

## 2024-06-27 DIAGNOSIS — N40.1 BENIGN PROSTATIC HYPERPLASIA WITH LOWER URINARY TRACT SYMPTOMS, SYMPTOM DETAILS UNSPECIFIED: ICD-10-CM

## 2024-06-27 PROCEDURE — G0439 PPPS, SUBSEQ VISIT: HCPCS | Performed by: FAMILY MEDICINE

## 2024-06-27 RX ORDER — TAMSULOSIN HYDROCHLORIDE 0.4 MG/1
0.4 CAPSULE ORAL
Qty: 90 CAPSULE | Refills: 3 | Status: SHIPPED | OUTPATIENT
Start: 2024-06-27

## 2024-06-27 NOTE — PROGRESS NOTES
Ambulatory Visit  Name: Paul Lakhani      : 1940      MRN: [id number]  Encounter Provider: Frank Lombardi, DO  Encounter Date: 2024   Encounter department: [facility]    Assessment & Plan   1. Medicare annual wellness visit, subsequent  2. Mixed hyperlipidemia  -     Comprehensive metabolic panel; Future; Expected date: 2024  -     Lipid Panel with Direct LDL reflex; Future; Expected date: 2024  -     CBC; Future  3. S/P CABG x 2  4. Coronary artery disease involving native coronary artery of native heart without angina pectoris  -     Comprehensive metabolic panel; Future; Expected date: 2024  -     Lipid Panel with Direct LDL reflex; Future; Expected date: 2024  -     CBC; Future  5. Essential hypertension  -     Comprehensive metabolic panel; Future; Expected date: 2024  -     Lipid Panel with Direct LDL reflex; Future; Expected date: 2024  -     CBC; Future  6. Major depressive disorder, single episode, mild (HCC)  7. Aortic root dilatation (HCC)  8. Benign prostatic hyperplasia with lower urinary tract symptoms, symptom details unspecified  -     tamsulosin (FLOMAX) 0.4 mg; Take 1 capsule (0.4 mg total) by mouth daily with dinner  9. Hx of bladder cancer  -     tamsulosin (FLOMAX) 0.4 mg; Take 1 capsule (0.4 mg total) by mouth daily with dinner       Preventive health issues were discussed with patient, and age appropriate screening tests were ordered as noted in patient's After Visit Summary. Personalized health advice and appropriate referrals for health education or preventive services given if needed, as noted in patient's After Visit Summary.    History of Present Illness     Pt is sched for an AWV    Pt was advised by GI he was done with his colonoscopies    Pt states sometimes he has urine leakge  -- sometimes he is not .  Rare/occasionally  He was on flomax in the past, had surgery       Patient Care Team:  Frank Lombardi, DO as PCP -  General (Family Medicine)  Wendi Araiza, DO as PCP - PCP-[facility] ([facility])  Bebeto Carias,  as Consulting Physician (Sports Medicine)  Hussain Gonzalez,  as Consulting Physician (Pain Medicine)  Sean Alvarez MD as Consulting Physician (Cardiology)  Turner Liriano MD as Consulting Physician (Dermatology)    Review of Systems  Medical History Reviewed by provider this encounter:       Annual Wellness Visit Questionnaire   Paul is here for his Subsequent Wellness visit. Last Medicare Wellness visit information reviewed, patient interviewed and updates made to the record today.      Health Risk Assessment:   Patient rates overall health as good. Patient feels that their physical health rating is same. Patient is dissatisfied with their life. Eyesight was rated as same. Hearing was rated as same. Patient feels that their emotional and mental health rating is same. Patients states they are sometimes angry. Patient states they are sometimes unusually tired/fatigued. Pain experienced in the last 7 days has been none. Patient states that he has experienced no weight loss or gain in last 6 months.     Depression Screening:   PHQ-9 Score: 9      Fall Risk Screening:   In the past year, patient has experienced: no history of falling in past year      Home Safety:  Patient does not have trouble with stairs inside or outside of their home. Patient has working smoke alarms and has working carbon monoxide detector. Home safety hazards include: none.     Nutrition:   Current diet is Regular.     Medications:   Patient is currently taking over-the-counter supplements. OTC medications include: see medication list. Patient is able to manage medications.     Activities of Daily Living (ADLs)/Instrumental Activities of Daily Living (IADLs):   Walk and transfer into and out of bed and chair?: Yes  Dress and groom yourself?: Yes    Bathe or shower yourself?: Yes    Feed yourself? Yes  Do your laundry/housekeeping?:  Yes  Manage your money, pay your bills and track your expenses?: Yes  Make your own meals?: Yes    Do your own shopping?: Yes    Previous Hospitalizations:   Any hospitalizations or ED visits within the last 12 months?: No      Advance Care Planning:   Living will: Yes    Advanced directive: Yes      Cognitive Screening:   Provider or family/friend/caregiver concerned regarding cognition?: No    PREVENTIVE SCREENINGS      Cardiovascular Screening:    General: Screening Not Indicated, History Lipid Disorder and Risks and Benefits Discussed    Due for: Lipid Panel      Diabetes Screening:     General: Screening Current and Risks and Benefits Discussed    Due for: Blood Glucose      Colorectal Cancer Screening:     General: Risks and Benefits Discussed      Prostate Cancer Screening:    General: Screening Not Indicated      Osteoporosis Screening:    General: Screening Not Indicated and History Osteoporosis      Abdominal Aortic Aneurysm (AAA) Screening:    Risk factors include: tobacco use        General: Screening Not Indicated      Lung Cancer Screening:     General: Screening Not Indicated      Hepatitis C Screening:    General: Screening Current    Screening, Brief Intervention, and Referral to Treatment (SBIRT)    Screening      AUDIT-C Screenin) How often did you have a drink containing alcohol in the past year? 4 or more times a week  2) How many drinks did you have on a typical day when you were drinking in the past year? 1 to 2  3) How often did you have 6 or more drinks on one occasion in the past year? never    AUDIT-C Score: 4  Interpretation: Score 4-12 (male): POSITIVE screen for alcohol misuse    AUDIT Screenin) How often during the last year have you found that you were not able to stop drinking once you had started? 0 - never  5) How often during the last year have you failed to do what was normally expected from you because of drinking? 0 - never  6) How often during the last year have you  needed a first drink in the morning to get yourself going after a heavy drinking session? 0 - never  7) How often during the last year have you had a feeling of guilt or remorse after drinking? 0 - never  8) How often during the last year have you been unable to remember what happened the night before because you had been drinking? 0 - never  9) Have you or someone else been injured as a result of your drinking? 0 - no  10) Has a relative or friend or a doctor or another health worker been concerned about your drinking or suggested you cut down? 0 - no    AUDIT Score: 4  Interpretation: Low risk alcohol consumption    Single Item Drug Screening:  How often have you used an illegal drug (including marijuana) or a prescription medication for non-medical reasons in the past year? never    Single Item Drug Screen Score: 0  Interpretation: Negative screen for possible drug use disorder    Brief Intervention  Alcohol & drug use screenings were reviewed. No concerns regarding substance use disorder identified.     Social Determinants of Health     Financial Resource Strain: Low Risk  (12/6/2022)    Overall Financial Resource Strain ([facility])    • Difficulty of Paying Living Expenses: Not hard at all   Food Insecurity: No Food Insecurity (6/27/2024)    Hunger Vital Sign    • Worried About Running Out of Food in the Last Year: Never true    • Ran Out of Food in the Last Year: Never true   Transportation Needs: No Transportation Needs (6/27/2024)    PRAPARE - Transportation    • Lack of Transportation (Medical): No    • Lack of Transportation (Non-Medical): No   Housing Stability: Low Risk  (6/27/2024)    Housing Stability Vital Sign    • Unable to Pay for Housing in the Last Year: No    • Number of Times Moved in the Last Year: 1    • Homeless in the Last Year: No   Utilities: Not At Risk (6/27/2024)    [facility] Utilities    • Threatened with loss of utilities: No     No results found.    Objective     /60   Pulse 62    "Temp 97.6 °F (36.4 °C)   Resp 16   Ht 5' 4\" (1.626 m)   Wt 75.3 kg (166 lb)   BMI 28.49 kg/m²     Physical Exam  Administrative Statements       Recent Results (from the past 672 hour(s))   Comprehensive metabolic panel    Collection Time: 06/21/24  8:53 AM   Result Value Ref Range    Sodium 132 (L) 135 - 147 mmol/L    Potassium 4.4 3.5 - 5.3 mmol/L    Chloride 100 96 - 108 mmol/L    CO2 25 21 - 32 mmol/L    ANION GAP 7 4 - 13 mmol/L    BUN 20 5 - 25 mg/dL    Creatinine 1.06 0.60 - 1.30 mg/dL    Glucose, Fasting 95 65 - 99 mg/dL    Calcium 9.5 8.4 - 10.2 mg/dL    AST 20 13 - 39 U/L    ALT 19 7 - 52 U/L    Alkaline Phosphatase 57 34 - 104 U/L    Total Protein 7.0 6.4 - 8.4 g/dL    Albumin 4.2 3.5 - 5.0 g/dL    Total Bilirubin 0.57 0.20 - 1.00 mg/dL    eGFR 65 ml/min/1.73sq m   Lipid Panel with Direct LDL reflex    Collection Time: 06/21/24  8:53 AM   Result Value Ref Range    Cholesterol 171 See Comment mg/dL    Triglycerides 137 See Comment mg/dL    HDL, Direct 57 >=40 mg/dL    LDL Calculated 87 0 - 100 mg/dL   CBC    Collection Time: 06/21/24  8:53 AM   Result Value Ref Range    WBC 8.27 4.31 - 10.16 Thousand/uL    RBC 4.31 3.88 - 5.62 Million/uL    Hemoglobin 14.1 12.0 - 17.0 g/dL    Hematocrit 42.2 36.5 - 49.3 %    MCV 98 82 - 98 fL    MCH 32.7 26.8 - 34.3 pg    MCHC 33.4 31.4 - 37.4 g/dL    RDW 13.1 11.6 - 15.1 %    Platelets 225 149 - 390 Thousands/uL    MPV 9.6 8.9 - 12.7 fL           "

## 2024-06-27 NOTE — PATIENT INSTRUCTIONS
Medicare Preventive Visit Patient Instructions  Thank you for completing your Welcome to Medicare Visit or Medicare Annual Wellness Visit today. Your next wellness visit will be due in one year (6/28/2025).  The screening/preventive services that you may require over the next 5-10 years are detailed below. Some tests may not apply to you based off risk factors and/or age. Screening tests ordered at today's visit but not completed yet may show as past due. Also, please note that scanned in results may not display below.  Preventive Screenings:  Service Recommendations Previous Testing/Comments   Colorectal Cancer Screening  Colonoscopy    Fecal Occult Blood Test (FOBT)/Fecal Immunochemical Test (FIT)  Fecal DNA/Cologuard Test  Flexible Sigmoidoscopy Age: 45-75 years old   Colonoscopy: every 10 years (May be performed more frequently if at higher risk)  OR  FOBT/FIT: every 1 year  OR  Cologuard: every 3 years  OR  Sigmoidoscopy: every 5 years  Screening may be recommended earlier than age 45 if at higher risk for colorectal cancer. Also, an individualized decision between you and your healthcare provider will decide whether screening between the ages of 76-85 would be appropriate. Colonoscopy: Not on file  FOBT/FIT: Not on file  Cologuard: Not on file  Sigmoidoscopy: Not on file          Prostate Cancer Screening Individualized decision between patient and health care provider in men between ages of 55-69   Medicare will cover every 12 months beginning on the day after your 50th birthday PSA: 0.14 ng/mL     Screening Not Indicated     Hepatitis C Screening Once for adults born between 1945 and 1965  More frequently in patients at high risk for Hepatitis C Hep C Antibody: 09/27/2022    Screening Current   Diabetes Screening 1-2 times per year if you're at risk for diabetes or have pre-diabetes Fasting glucose: 95 mg/dL (6/21/2024)  A1C: No results in last 5 years (No results in last 5 years)  Screening Current    Cholesterol Screening Once every 5 years if you don't have a lipid disorder. May order more often based on risk factors. Lipid panel: 06/21/2024  Screening Not Indicated  History Lipid Disorder      Other Preventive Screenings Covered by Medicare:  Abdominal Aortic Aneurysm (AAA) Screening: covered once if your at risk. You're considered to be at risk if you have a family history of AAA or a male between the age of 65-75 who smoking at least 100 cigarettes in your lifetime.  Lung Cancer Screening: covers low dose CT scan once per year if you meet all of the following conditions: (1) Age 55-77; (2) No signs or symptoms of lung cancer; (3) Current smoker or have quit smoking within the last 15 years; (4) You have a tobacco smoking history of at least 20 pack years (packs per day x number of years you smoked); (5) You get a written order from a healthcare provider.  Glaucoma Screening: covered annually if you're considered high risk: (1) You have diabetes OR (2) Family history of glaucoma OR (3)  aged 50 and older OR (4)  American aged 65 and older  Osteoporosis Screening: covered every 2 years if you meet one of the following conditions: (1) Have a vertebral abnormality; (2) On glucocorticoid therapy for more than 3 months; (3) Have primary hyperparathyroidism; (4) On osteoporosis medications and need to assess response to drug therapy.  HIV Screening: covered annually if you're between the age of 15-65. Also covered annually if you are younger than 15 and older than 65 with risk factors for HIV infection. For pregnant patients, it is covered up to 3 times per pregnancy.    Immunizations:  Immunization Recommendations   Influenza Vaccine Annual influenza vaccination during flu season is recommended for all persons aged >= 6 months who do not have contraindications   Pneumococcal Vaccine   * Pneumococcal conjugate vaccine = PCV13 (Prevnar 13), PCV15 (Vaxneuvance), PCV20 (Prevnar 20)  *  Pneumococcal polysaccharide vaccine = PPSV23 (Pneumovax) Adults 19-63 yo with certain risk factors or if 65+ yo  If never received any pneumonia vaccine: recommend Prevnar 20 (PCV20)  Give PCV20 if previously received 1 dose of PCV13 or PPSV23   Hepatitis B Vaccine 3 dose series if at intermediate or high risk (ex: diabetes, end stage renal disease, liver disease)   Respiratory syncytial virus (RSV) Vaccine - COVERED BY MEDICARE PART D  * RSVPreF3 (Arexvy) CDC recommends that adults 60 years of age and older may receive a single dose of RSV vaccine using shared clinical decision-making (SCDM)   Tetanus (Td) Vaccine - COST NOT COVERED BY MEDICARE PART B Following completion of primary series, a booster dose should be given every 10 years to maintain immunity against tetanus. Td may also be given as tetanus wound prophylaxis.   Tdap Vaccine - COST NOT COVERED BY MEDICARE PART B Recommended at least once for all adults. For pregnant patients, recommended with each pregnancy.   Shingles Vaccine (Shingrix) - COST NOT COVERED BY MEDICARE PART B  2 shot series recommended in those 19 years and older who have or will have weakened immune systems or those 50 years and older     Health Maintenance Due:      Topic Date Due   • Hepatitis C Screening  Completed     Immunizations Due:      Topic Date Due   • COVID-19 Vaccine (4 - 2023-24 season) 09/01/2023   • Influenza Vaccine (Season Ended) 09/01/2024     Advance Directives   What are advance directives?  Advance directives are legal documents that state your wishes and plans for medical care. These plans are made ahead of time in case you lose your ability to make decisions for yourself. Advance directives can apply to any medical decision, such as the treatments you want, and if you want to donate organs.   What are the types of advance directives?  There are many types of advance directives, and each state has rules about how to use them. You may choose a combination of any  of the following:  Living will:  This is a written record of the treatment you want. You can also choose which treatments you do not want, which to limit, and which to stop at a certain time. This includes surgery, medicine, IV fluid, and tube feedings.   Durable power of  for healthcare (DPAHC):  This is a written record that states who you want to make healthcare choices for you when you are unable to make them for yourself. This person, called a proxy, is usually a family member or a friend. You may choose more than 1 proxy.  Do not resuscitate (DNR) order:  A DNR order is used in case your heart stops beating or you stop breathing. It is a request not to have certain forms of treatment, such as CPR. A DNR order may be included in other types of advance directives.  Medical directive:  This covers the care that you want if you are in a coma, near death, or unable to make decisions for yourself. You can list the treatments you want for each condition. Treatment may include pain medicine, surgery, blood transfusions, dialysis, IV or tube feedings, and a ventilator (breathing machine).  Values history:  This document has questions about your views, beliefs, and how you feel and think about life. This information can help others choose the care that you would choose.  Why are advance directives important?  An advance directive helps you control your care. Although spoken wishes may be used, it is better to have your wishes written down. Spoken wishes can be misunderstood, or not followed. Treatments may be given even if you do not want them. An advance directive may make it easier for your family to make difficult choices about your care.   Weight Management   Why it is important to manage your weight:  Being overweight increases your risk of health conditions such as heart disease, high blood pressure, type 2 diabetes, and certain types of cancer. It can also increase your risk for osteoarthritis, sleep  apnea, and other respiratory problems. Aim for a slow, steady weight loss. Even a small amount of weight loss can lower your risk of health problems.  How to lose weight safely:  A safe and healthy way to lose weight is to eat fewer calories and get regular exercise. You can lose up about 1 pound a week by decreasing the number of calories you eat by 500 calories each day.   Healthy meal plan for weight management:  A healthy meal plan includes a variety of foods, contains fewer calories, and helps you stay healthy. A healthy meal plan includes the following:  Eat whole-grain foods more often.  A healthy meal plan should contain fiber. Fiber is the part of grains, fruits, and vegetables that is not broken down by your body. Whole-grain foods are healthy and provide extra fiber in your diet. Some examples of whole-grain foods are whole-wheat breads and pastas, oatmeal, brown rice, and bulgur.  Eat a variety of vegetables every day.  Include dark, leafy greens such as spinach, kale, sumit greens, and mustard greens. Eat yellow and orange vegetables such as carrots, sweet potatoes, and winter squash.   Eat a variety of fruits every day.  Choose fresh or canned fruit (canned in its own juice or light syrup) instead of juice. Fruit juice has very little or no fiber.  Eat low-fat dairy foods.  Drink fat-free (skim) milk or 1% milk. Eat fat-free yogurt and low-fat cottage cheese. Try low-fat cheeses such as mozzarella and other reduced-fat cheeses.  Choose meat and other protein foods that are low in fat.  Choose beans or other legumes such as split peas or lentils. Choose fish, skinless poultry (chicken or turkey), or lean cuts of red meat (beef or pork). Before you cook meat or poultry, cut off any visible fat.   Use less fat and oil.  Try baking foods instead of frying them. Add less fat, such as margarine, sour cream, regular salad dressing and mayonnaise to foods. Eat fewer high-fat foods. Some examples of high-fat  "foods include french fries, doughnuts, ice cream, and cakes.  Eat fewer sweets.  Limit foods and drinks that are high in sugar. This includes candy, cookies, regular soda, and sweetened drinks.  Exercise:  Exercise at least 30 minutes per day on most days of the week. Some examples of exercise include walking, biking, dancing, and swimming. You can also fit in more physical activity by taking the stairs instead of the elevator or parking farther away from stores. Ask your healthcare provider about the best exercise plan for you.   Alcohol Use and Your Health    Drinking too much can harm your health.  Excessive alcohol use leads to about 88,000 death in the [place] each year, and shortens the life of those who diet by almost 30 years.  Further, excessive drinking cost the economy $249 billion in 2010.  Most excessive drinkers are not alcohol dependent.    Excessive alcohol use has immediate effects that increase the risk of many harmful health conditions.  These are most often the result of binge drinking.  Over time, excessive alcohol use can lead to the development of chronic diseases and other series health problems.    What is considered a \"drink\"?        Excessive alcohol use includes:  Binge Drinking: For women, 4 or more drinks consumed on one occasion. For men, 5 or more drinks consumed on one occasion.  Heavy Drinking: For women, 8 or more drinks per week. For men, 15 or more drinks per week  Any alcohol used by pregnant women  Any alcohol used by those under the age of 21 years    If you choose to drink, do so in moderation:  Do not drink at all if you are under the age of 21, or if you are or may be pregnant, or have health problems that could be made worse by drinking.  For women, up to 1 drink per day  For men, up to 2 drinks a day    No one should begin drinking or drink more frequently based on potential health benefits    Short-Term Health Risks:  Injuries: motor vehicle crashes, falls, " drownings, burns  Violence: homicide, suicide, sexual assault, intimate partner violence  Alcohol poisoning  Reproductive health: risky sexual behaviors, unintended prengnacy, sexually transmitted diseases, miscarriage, stillbirth, fetal alcohol syndrome    Long-Term Health Risks:  Chronic diseases: high blood pressure, heart disease, stroke, liver disease, digestive problems  Cancers: breast, mouth and throat, liver, colon  Learning and memory problems: dementia, poor school performance  Mental health: depression, anxiety, insomnia  Social problems: lost productivity, family problems, unemployment  Alcohol dependence    For support and more information:  Substance Abuse and Mental Health Services Administration  [address]  [place] [address]  Web Address: http://www.samhsa.gov    Alcoholics Anonymous        Web Address: http://www.aa.org    https://www.cdc.gov/alcohol/fact-sheets/alcohol-use.htm     © Copyright [employer] 2018 Information is for End User's use only and may not be sold, redistributed or otherwise used for commercial purposes. All illustrations and images included in CareNotes® are the copyrighted property of [employer] Inc[employer] or [employer]

## 2024-06-30 DIAGNOSIS — I10 ESSENTIAL HYPERTENSION: ICD-10-CM

## 2024-06-30 RX ORDER — AMLODIPINE BESYLATE 2.5 MG/1
TABLET ORAL
Qty: 90 TABLET | Refills: 1 | Status: SHIPPED | OUTPATIENT
Start: 2024-06-30

## 2024-07-05 ENCOUNTER — TELEPHONE (OUTPATIENT)
Age: 84
End: 2024-07-05

## 2024-07-05 NOTE — TELEPHONE ENCOUNTER
Patient called in and wanted to let Dr. Lombardi know that the Tamsulosin 0.4 mg is no longer working for him this time around. He would like to know if Dr. Lombardi would want him to come in again to discuss further or if Dr. Lombardi would recommend him seeing a urologist at this point for the incontinence. Please advise. Thank you.

## 2024-07-08 NOTE — TELEPHONE ENCOUNTER
Called patient who stated he does not need the referral anymore because the medication started working. No further action  Rosemarie Rodriguez CMA

## 2024-09-09 DIAGNOSIS — I10 ESSENTIAL HYPERTENSION: ICD-10-CM

## 2024-09-09 RX ORDER — LOSARTAN POTASSIUM 100 MG/1
100 TABLET ORAL DAILY
Qty: 90 TABLET | Refills: 1 | Status: SHIPPED | OUTPATIENT
Start: 2024-09-09

## 2024-10-25 DIAGNOSIS — I25.10 CORONARY ARTERY DISEASE INVOLVING NATIVE CORONARY ARTERY OF NATIVE HEART WITHOUT ANGINA PECTORIS: ICD-10-CM

## 2024-10-25 DIAGNOSIS — E78.2 MIXED HYPERLIPIDEMIA: ICD-10-CM

## 2024-10-25 RX ORDER — ATORVASTATIN CALCIUM 80 MG/1
TABLET, FILM COATED ORAL
Qty: 90 TABLET | Refills: 1 | Status: SHIPPED | OUTPATIENT
Start: 2024-10-25

## 2024-11-30 DIAGNOSIS — I25.10 CORONARY ARTERY DISEASE INVOLVING NATIVE CORONARY ARTERY OF NATIVE HEART WITHOUT ANGINA PECTORIS: ICD-10-CM

## 2024-11-30 DIAGNOSIS — I49.1 PREMATURE ATRIAL CONTRACTIONS: ICD-10-CM

## 2024-12-02 RX ORDER — METOPROLOL SUCCINATE 50 MG/1
50 TABLET, EXTENDED RELEASE ORAL DAILY
Qty: 90 TABLET | Refills: 1 | Status: SHIPPED | OUTPATIENT
Start: 2024-12-02

## 2024-12-03 ENCOUNTER — TELEPHONE (OUTPATIENT)
Age: 84
End: 2024-12-03

## 2024-12-03 ENCOUNTER — APPOINTMENT (OUTPATIENT)
Dept: LAB | Facility: HOSPITAL | Age: 84
End: 2024-12-03
Attending: FAMILY MEDICINE
Payer: COMMERCIAL

## 2024-12-03 DIAGNOSIS — I10 ESSENTIAL HYPERTENSION: ICD-10-CM

## 2024-12-03 DIAGNOSIS — I25.10 CORONARY ARTERY DISEASE INVOLVING NATIVE CORONARY ARTERY OF NATIVE HEART WITHOUT ANGINA PECTORIS: ICD-10-CM

## 2024-12-03 DIAGNOSIS — E78.2 MIXED HYPERLIPIDEMIA: ICD-10-CM

## 2024-12-03 LAB
ALBUMIN SERPL BCG-MCNC: 4.1 G/DL (ref 3.5–5)
ALP SERPL-CCNC: 61 U/L (ref 34–104)
ALT SERPL W P-5'-P-CCNC: 19 U/L (ref 7–52)
ANION GAP SERPL CALCULATED.3IONS-SCNC: 5 MMOL/L (ref 4–13)
AST SERPL W P-5'-P-CCNC: 22 U/L (ref 13–39)
BILIRUB SERPL-MCNC: 0.65 MG/DL (ref 0.2–1)
BUN SERPL-MCNC: 17 MG/DL (ref 5–25)
CALCIUM SERPL-MCNC: 9.4 MG/DL (ref 8.4–10.2)
CHLORIDE SERPL-SCNC: 102 MMOL/L (ref 96–108)
CHOLEST SERPL-MCNC: 168 MG/DL (ref ?–200)
CO2 SERPL-SCNC: 29 MMOL/L (ref 21–32)
CREAT SERPL-MCNC: 0.99 MG/DL (ref 0.6–1.3)
ERYTHROCYTE [DISTWIDTH] IN BLOOD BY AUTOMATED COUNT: 13.2 % (ref 11.6–15.1)
GFR SERPL CREATININE-BSD FRML MDRD: 71 ML/MIN/1.73SQ M
GLUCOSE P FAST SERPL-MCNC: 102 MG/DL (ref 65–99)
HCT VFR BLD AUTO: 40.5 % (ref 36.5–49.3)
HDLC SERPL-MCNC: 58 MG/DL
HGB BLD-MCNC: 13.5 G/DL (ref 12–17)
LDLC SERPL CALC-MCNC: 83 MG/DL (ref 0–100)
MCH RBC QN AUTO: 32.5 PG (ref 26.8–34.3)
MCHC RBC AUTO-ENTMCNC: 33.3 G/DL (ref 31.4–37.4)
MCV RBC AUTO: 98 FL (ref 82–98)
PLATELET # BLD AUTO: 209 THOUSANDS/UL (ref 149–390)
PMV BLD AUTO: 9.6 FL (ref 8.9–12.7)
POTASSIUM SERPL-SCNC: 4.7 MMOL/L (ref 3.5–5.3)
PROT SERPL-MCNC: 7 G/DL (ref 6.4–8.4)
RBC # BLD AUTO: 4.15 MILLION/UL (ref 3.88–5.62)
SODIUM SERPL-SCNC: 136 MMOL/L (ref 135–147)
TRIGL SERPL-MCNC: 135 MG/DL (ref ?–150)
WBC # BLD AUTO: 8.15 THOUSAND/UL (ref 4.31–10.16)

## 2024-12-03 PROCEDURE — 80061 LIPID PANEL: CPT

## 2024-12-03 PROCEDURE — 80053 COMPREHEN METABOLIC PANEL: CPT

## 2024-12-03 PROCEDURE — 85027 COMPLETE CBC AUTOMATED: CPT

## 2024-12-03 PROCEDURE — 36415 COLL VENOUS BLD VENIPUNCTURE: CPT

## 2024-12-03 NOTE — TELEPHONE ENCOUNTER
Pt called to make f/u appt to review bw.  Patient stated he went for bw as requested but he does not know when he was supposed to return for visit.  Please advise.

## 2024-12-03 NOTE — TELEPHONE ENCOUNTER
We generally see them every 6 months but I do believe they see geriatric medicine so I am not sure what they want to do.  Labs are ok

## 2024-12-11 NOTE — TELEPHONE ENCOUNTER
I spoke with Paul. He states that he does not plan to see geriatric medicine. I told him his labs were ok, as per your message. I wanted to confirm also about his wife (see separate message in HER chart) before we make a follow up appt for Paul. You want to see him every 6 months, correct? Once we have responses back for both of them, we can call him back to schedule them together [email or web address]

## 2024-12-13 DIAGNOSIS — F41.9 ANXIETY: ICD-10-CM

## 2024-12-23 DIAGNOSIS — I10 ESSENTIAL HYPERTENSION: ICD-10-CM

## 2024-12-24 RX ORDER — AMLODIPINE BESYLATE 2.5 MG/1
2.5 TABLET ORAL DAILY
Qty: 90 TABLET | Refills: 1 | Status: SHIPPED | OUTPATIENT
Start: 2024-12-24

## 2025-03-07 DIAGNOSIS — I10 ESSENTIAL HYPERTENSION: ICD-10-CM

## 2025-03-07 RX ORDER — LOSARTAN POTASSIUM 100 MG/1
100 TABLET ORAL DAILY
Qty: 90 TABLET | Refills: 1 | Status: SHIPPED | OUTPATIENT
Start: 2025-03-07

## 2025-04-01 DIAGNOSIS — E78.2 MIXED HYPERLIPIDEMIA: ICD-10-CM

## 2025-04-01 DIAGNOSIS — I25.10 CORONARY ARTERY DISEASE INVOLVING NATIVE CORONARY ARTERY OF NATIVE HEART WITHOUT ANGINA PECTORIS: ICD-10-CM

## 2025-04-02 RX ORDER — ATORVASTATIN CALCIUM 80 MG/1
80 TABLET, FILM COATED ORAL DAILY
Qty: 90 TABLET | Refills: 0 | Status: SHIPPED | OUTPATIENT
Start: 2025-04-02

## 2025-04-14 ENCOUNTER — OFFICE VISIT (OUTPATIENT)
Dept: CARDIOLOGY CLINIC | Facility: CLINIC | Age: 85
End: 2025-04-14
Payer: COMMERCIAL

## 2025-04-14 VITALS
BODY MASS INDEX: 27.49 KG/M2 | DIASTOLIC BLOOD PRESSURE: 70 MMHG | OXYGEN SATURATION: 97 % | WEIGHT: 161 LBS | HEIGHT: 64 IN | HEART RATE: 60 BPM | SYSTOLIC BLOOD PRESSURE: 134 MMHG

## 2025-04-14 DIAGNOSIS — I25.10 CORONARY ARTERY DISEASE INVOLVING NATIVE CORONARY ARTERY OF NATIVE HEART WITHOUT ANGINA PECTORIS: ICD-10-CM

## 2025-04-14 DIAGNOSIS — Z95.1 S/P CABG X 2: ICD-10-CM

## 2025-04-14 DIAGNOSIS — I45.2 BIFASCICULAR BLOCK: ICD-10-CM

## 2025-04-14 DIAGNOSIS — E78.2 MIXED HYPERLIPIDEMIA: ICD-10-CM

## 2025-04-14 DIAGNOSIS — I11.9 HYPERTENSIVE HEART DISEASE WITHOUT HEART FAILURE: ICD-10-CM

## 2025-04-14 DIAGNOSIS — R00.2 PALPITATIONS: ICD-10-CM

## 2025-04-14 DIAGNOSIS — I35.0 NONRHEUMATIC AORTIC VALVE STENOSIS: ICD-10-CM

## 2025-04-14 DIAGNOSIS — Z87.898 H/O BRAIN TUMOR: ICD-10-CM

## 2025-04-14 DIAGNOSIS — I49.1 PREMATURE ATRIAL CONTRACTIONS: ICD-10-CM

## 2025-04-14 PROCEDURE — 93000 ELECTROCARDIOGRAM COMPLETE: CPT | Performed by: INTERNAL MEDICINE

## 2025-04-14 PROCEDURE — 99214 OFFICE O/P EST MOD 30 MIN: CPT | Performed by: INTERNAL MEDICINE

## 2025-04-14 NOTE — PROGRESS NOTES
Progress note- Cardiology Office  [facility].    Paul Lakhani 84 y.o. male MRN: [id number]  : 1940  Unit/Bed#:  Encounter: [id number]      Assessment:     1. Hypertensive heart disease without heart failure    2. Coronary artery disease involving native coronary artery of native heart without angina pectoris    3. S/P CABG x 2    4. Nonrheumatic aortic valve stenosis    5. Palpitations    6. Premature atrial contractions    7. Mixed hyperlipidemia    8. Bifascicular block    9. H/O brain tumor        Discussion summary and Plan:    1. Coronary artery disease status post coronary bypass surgery with 2 vessel bypass in  in a [facility] details not available will try to get records.  He had a cardiac workup done in the form of stress test and echo Doppler in the last 1 year with cardiologist there.  Those tests reviewed with him.  His stress test from 2019 was acceptable.  Or change in symptoms.  He feels well denies any chest pain he is able to care for his wife without any problem.    2. Essential hypertension.  Patient blood pressure has improved with addition of amlodipine.  His heart rate average is around 80 bpm.  He has frequent PACs will increase Toprol-XL to 50 mg daily.  Continue current medications.  Lifestyle modification recommended as he still drinks about 4 cups of caffeine a day.    3. Dyslipidemia.  Patient is on a high intensity statin blood test from 12/3/2024 reviewed cholesterol profile reviewed he is on Lipitor 80 mg.    4. Bifascicular block.  Patient has history of bifascicular block.  His heart rate is 60 bpm.  He is tolerating his metoprolol very well no dizziness no lightheadedness.  Echo Doppler ordered as he had frequent PACs.    5. History of anxiety is the primary care provider for her wife.  He is under a lot of stress he drinks caffeine advised him to cut back on his caffeine intake.  Discussed with him again.    6. History of brain tumor  which was benign has recovered well.    7..  Abnormal EKG with frequent PACs.  Patient has PACs 23% he was on beta-blocker heart rate is 60 bpm.  Echo Doppler ordered.    Plan.  Continue current Rx.  He seems to be tolerating his medications well.  He had a bifascicular block if he has any symptoms dizziness line and she will call us.  Echo Doppler will be ordered.      Patient  was advised and educated to call our office  immediately if  patient has any new symptoms of chest pain/shortness of breath, near-syncope, syncope, light headedness sustained palpitations or any other cardiovascular symptoms before their scheduled follow-up appointment.  Office number was provided #[phone].      Thank you for your consultation.  If you have any question please call me at [phone]    Counseling :  A description of the counseling.  Goals and Barriers.  Patient's ability to self care: Yes  Medication side effect reviewed with patient in detail and all their questions answered to their satisfaction.      Primary Care Physician: Frank Lombardi, DO     HPI:     Paul Lakhani is a 84 y.o. year old male who was referred by primary care doctor for for multiple cardiac risk factors.  Patient has medical history significant for coronary artery disease status post 2 vessel bypass in 1989 in [facility], dyslipidemia, hypertension, bifascicular block, history of benign brain tumor who has moved to this area from [place] and came to see us.  He used to follow with cardiologist there and has regular cardiac care.  He believes he had a echo and stress test done within a year reports of which will be obtained from them.  His EKG shows bifascicular block with LVH with repolarization abnormality we do not have old EKG to compare but patient was aware of it.  He used to smoke he has quit smoking.  He had a family history of heart disease.  He moved to this area as his wife now having issues and he want to close his  family.  He has recently blood test done in September 2022 which has been acceptable.  No nausea no vomiting no issues related to cardiac problem he is having issue with his back problem and is currently in pain.  No other cardiovascular problem at this time.    He smokes about pack a day for about 12 years he quit smoking wave before his surgery.  No other recent surgery.  He used to walk a lot.  Recently not walking that much due to back problems.    Patient additional records from his previous cardiologist reviewed.  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    1/9/2023.    Above reviewed.  Patient came for follow-up.  He has medical history significant for coronary artery disease s/p two-vessel bypass in 1989, dyslipidemia, hypertension, bifascicular block, history of benign brain tumor and back problems who came for follow-up.  He is doing well from heart point of view but his blood pressure has been up and down.  He used to smoke he has quit smoking.  He has a cardiac work-up done before he moved to this area which has been acceptable which include echo as well as nuclear stress test since 2019.  He smoked about 12 years since quit smoking for his surgery.  No nausea no vomiting no fever no chills.  He had a history of cerebellar hemangioblastoma s/p surgery in 1999 and history of transitional cell tumor of the bladder.  History of mild aortic stenosis and cardiac murmur.  No other issues at this time..  Patient has EKG done at primary care doctor's office which was read as sinus with few PACs I could not see those EKG.    7/26/2023.    12 reviewed.  Patient came for follow-up.  Had history of coronary artery disease s/p two-vessel bypass in 1989, dyslipidemia, hypertension, bifascicular block, history of benign brain tumor s/p surgery.  He has a Holter monitor done he has  frequent PACs there were 23% of the total beats.  His EF is acceptable.  He drinks 4 to 6 cups of coffee a day.  Does have history of cardiac murmur.  He understand he need to cut back on his caffeine intake.  His Holter reviewed with him blood pressure has improved.  He used to smoke he is quit smoking now.  No other cardiovascular issues his med list reviewed with him.  His last blood test was 6/2/2023 which shows cholesterol profile and electrolyte are stable.    4/14/2025.    Above reviewed.  Patient came for follow-up.  He had history of CAD s/p two-vessel bypass in 1979, hypertensive heart disease, dyslipidemia, bifascicular block, history of benign tumor s/p surgery who came for follow-up.  He had a history of frequent PACs at 120 with 23% of the total beats.  His cardiac workup in the form of Holter monitor from 2023 reviewed.  He was last seen in our office in July 2023.  His blood work from 12/3/2024 reviewed.  Electrolytes, cholesterol profile and other labs were acceptable.  He has lost some weight.  Currently he is on amount of amlodipine 2.5 mg daily Lipitor 80 mg daily, losartan 100 mg daily Toprol-XL 50 mg daily and he is on Flomax.  He is little bit stressed at home because of his wife having dementia and he has to do a lot of activities EKG shows sinus rhythm with bifascicular block heart rate 60 bpm not much change from previous EKG.  No dizziness no lightheadedness no palpitations no syncope.  He does feel fatigued and tired as he has to get up multiple times at night no other issues.    Review of Systems   Constitutional:  Positive for fatigue. Negative for activity change, chills, diaphoresis, fever and unexpected weight change.   HENT:  Negative for congestion.    Eyes:  Negative for discharge and redness.   Respiratory:  Negative for cough, chest tightness, shortness of breath and wheezing.    Cardiovascular: Negative.  Negative for chest pain, palpitations and leg swelling.    Gastrointestinal:  Negative for abdominal pain, diarrhea and nausea.   Endocrine: Negative.    Genitourinary:  Negative for decreased urine volume and urgency.   Musculoskeletal: Negative.  Negative for arthralgias, back pain and gait problem.   Skin:  Negative for rash and wound.   Allergic/Immunologic: Negative.    Neurological:  Negative for dizziness, seizures, syncope, weakness, light-headedness and headaches.   Hematological: Negative.    Psychiatric/Behavioral:  Positive for sleep disturbance. Negative for agitation and confusion. The patient is nervous/anxious.        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/2009   • Urinary tract infection 2005     Past Surgical History:   Procedure Laterality Date   • BRAIN SURGERY     • CARDIAC CATHETERIZATION  1989   • COLONOSCOPY  2012    clear   • CORONARY ANGIOPLASTY  1989   • CORONARY ARTERY BYPASS GRAFT     • EPIDURAL BLOCK INJECTION N/A 01/04/2023    Procedure: BLOCK / INJECTION EPIDURAL STEROID LUMBAR L5-S1;  Surgeon: Hussain Gonzalez DO;  Location: [facility] MAIN OR;  Service: Pain Management    • EYE SURGERY  2015    cataract   • FLEXIBLE SIGMOIDOSCOPY  1992    clear   • LUMBAR EPIDURAL INJECTION N/A 11/09/2022    Procedure: L5 S1 LUMBAR epidural steroid injection ( 02645);  Surgeon: Hussain Gonzalez DO;  Location: [facility] MAIN OR;  Service: Pain Management    • LUMBAR LAMINECTOMY  04/2023   • ORTHOPEDIC  SURGERY  200    carpal Tunnel X2   • URINARY SURGERY       Social History     Substance and Sexual Activity   Alcohol Use Yes   • Alcohol/week: 2.0 standard drinks of alcohol   • Types: 2 Shots of liquor per week    Comment: 2 drinks of gin daily     Social History     Substance and Sexual Activity   Drug Use Never     Social History     Tobacco Use   Smoking Status Former   • Current packs/day: 0.00   • Average packs/day: 1 pack/day for 15.0 years (15.0 ttl pk-yrs)   • Types: Cigarettes   • Start date: 1957   • Quit date: 1972   • Years since quittin.3   Smokeless Tobacco Never     Family History:   Family History   Problem Relation Age of Onset   • Alzheimer's disease Mother    • Arthritis Mother    • Hearing loss Mother    • Heart disease Father    • Duffy's esophagus Father    • Stroke Maternal Grandmother    • Heart disease Paternal Grandfather    • Mental illness Neg Hx        Meds/Allergies     Allergies   Allergen Reactions   • Sulfa Antibiotics Irritability   • Erythromycin Other (See Comments)   • Sulfasalazine Other (See Comments)       Current Outpatient Medications:   •  amLODIPine (NORVASC) 2.5 mg tablet, TAKE ONE TABLET BY MOUTH EVERY DAY, Disp: 90 tablet, Rfl: 1  •  ASPIRIN 81 PO, Take by mouth, Disp: , Rfl:   •  atorvastatin (LIPITOR) 80 mg tablet, TAKE ONE TABLET BY MOUTH EVERY DAY, Disp: 90 tablet, Rfl: 0  •  Loperamide HCl (IMODIUM PO), Take 0.5 tablets by mouth as needed, Disp: , Rfl:   •  losartan (COZAAR) 100 MG tablet, TAKE ONE TABLET BY MOUTH EVERY DAY, Disp: 90 tablet, Rfl: 1  •  metoprolol succinate (TOPROL-XL) 50 mg 24 hr tablet, TAKE ONE TABLET BY MOUTH EVERY DAY, Disp: 90 tablet, Rfl: 1  •  Multiple Vitamins-Minerals (PRESERVISION AREDS 2 PO), Take by mouth, Disp: , Rfl:   •  niacin 500 mg tablet, 2 (two) times a day, Disp: , Rfl:   •  sertraline (ZOLOFT) 50 mg tablet, TAKE 1 AND 1/2 TABLETS BY MOUTH EVERY DAY, Disp: 135 tablet, Rfl: 1  •  tamsulosin (FLOMAX) 0.4 mg, Take  "1 capsule (0.4 mg total) by mouth daily with dinner, Disp: 90 capsule, Rfl: 3  •  Cholecalciferol 50 MCG (2000 UT) TABS, Take 1,000 Units by mouth daily, Disp: , Rfl:   •  nitroglycerin (NITROLINGUAL) 0.4 mg/spray spray, Place 1 spray under the tongue every 5 (five) minutes as needed for chest pain (Patient not taking: Reported on 4/14/2025), Disp: 4.9 g, Rfl: 1    Vitals: Blood pressure 134/70, pulse 60, height 5' 4\" (1.626 m), weight 73 kg (161 lb), SpO2 97%.    Body mass index is 27.64 kg/m².  Wt Readings from Last 3 Encounters:   04/14/25 73 kg (161 lb)   06/27/24 75.3 kg (166 lb)   02/14/24 75.3 kg (166 lb)     Vitals:    04/14/25 1531   Weight: 73 kg (161 lb)     BP Readings from Last 3 Encounters:   04/14/25 134/70   06/27/24 136/60   02/14/24 130/80         Physical Exam  Constitutional:       General: He is not in acute distress.     Appearance: He is well-developed. He is not diaphoretic.   Neck:      Thyroid: No thyromegaly.      Vascular: No JVD.      Trachea: No tracheal deviation.   Cardiovascular:      Rate and Rhythm: Normal rate and regular rhythm.      Heart sounds: S1 normal and S2 normal. Heart sounds not distant. Murmur heard.      Systolic (ejection) murmur is present with a grade of 2/6.      No friction rub. No gallop. No S3 or S4 sounds.   Pulmonary:      Effort: Pulmonary effort is normal. No respiratory distress.      Breath sounds: Normal breath sounds. No wheezing or rales.   Chest:      Chest wall: No tenderness.   Abdominal:      General: Bowel sounds are normal. There is no distension.      Palpations: Abdomen is soft.      Tenderness: There is no abdominal tenderness.   Musculoskeletal:         General: No deformity.      Cervical back: Neck supple.   Skin:     General: Skin is warm and dry.      Coloration: Skin is not pale.      Findings: No rash.   Neurological:      Mental Status: He is alert and oriented to person, place, and time.   Psychiatric:         Behavior: Behavior " normal.         Judgment: Judgment normal.             Diagnostic Studies Review Cardio:    Echo Doppler done with his cardiologist on January 2022 shows EF is 50-55 percent, mild aortic stenosis, left ventricle normal size with hypokinesis of inferior wall, mild mitral regurgitation, mild AI mild AS and aortic root was mildly dilated but ascending aorta was normal in size.    He has a normal Lexiscan on 07/02/2019.    Holter monitor.  Holter monitor done in January 2023 shows patient's average heart rate was 80 bpm.  PACs and PVCs noted PACs were 23% of the total beats.    EKG:    Twelve lead EKG 09/28/2022 shows normal sinus rhythm RBBB with left anterior fascicular block.  LVH with repolarization abnormality.  Patient has bifascicular block.  Patient is aware of it    EKG from his cardiologist done shows no significant change few PACs were noted.    Twelve-lead EKG done on 7/26/2023 shows sinus rhythm with bifascicular block LVH with repolarization normality heart rate 78 bpm not much change from previous EKG.    Twelve-lead EKG 4/14/2024 normal sinus rhythm heart rate 60 bpm bifascicular block no change from previous EKG.    Lab Review   Lab Results   Component Value Date    WBC 8.15 12/03/2024    HGB 13.5 12/03/2024    HCT 40.5 12/03/2024    MCV 98 12/03/2024    RDW 13.2 12/03/2024     12/03/2024     BMP:  Lab Results   Component Value Date    SODIUM 136 12/03/2024    K 4.7 12/03/2024     12/03/2024    CO2 29 12/03/2024    BUN 17 12/03/2024    CREATININE 0.99 12/03/2024    GLUF 102 (H) 12/03/2024    CALCIUM 9.4 12/03/2024    CORRECTEDCA 9.0 09/27/2022    EGFR 71 12/03/2024     Troponins:    LFT:  Lab Results   Component Value Date    AST 22 12/03/2024    ALT 19 12/03/2024    ALKPHOS 61 12/03/2024    TP 7.0 12/03/2024    ALB 4.1 12/03/2024      Lipid Profile:   Lab Results   Component Value Date    CHOLESTEROL 168 12/03/2024    HDL 58 12/03/2024    LDLCALC 83 12/03/2024    TRIG 135 12/03/2024  "    Lab Results   Component Value Date    CHOLESTEROL 168 12/03/2024    CHOLESTEROL 171 06/21/2024         Dr. Sean Alvarez MD [facility]      \"This note was completed in part utilizing [employer] direct voice recognition software.   Grammatical errors, random word insertion, spelling mistakes, and incomplete sentences may be an occasional consequence of the system secondary to software limitations, ambient noise and hardware issues.    Please read the chart carefully and recognize, using context, where substitutions have occurred.  If you have any questions or concerns about the context, text or information contained within the body of this dictation, please contact myself, the provider, for further clarification.\"  "

## 2025-04-29 ENCOUNTER — RESULTS FOLLOW-UP (OUTPATIENT)
Dept: CARDIOLOGY CLINIC | Facility: CLINIC | Age: 85
End: 2025-04-29

## 2025-04-29 ENCOUNTER — HOSPITAL ENCOUNTER (OUTPATIENT)
Dept: NON INVASIVE DIAGNOSTICS | Facility: HOSPITAL | Age: 85
Discharge: HOME | End: 2025-04-29
Attending: INTERNAL MEDICINE
Payer: COMMERCIAL

## 2025-04-29 VITALS
WEIGHT: 161 LBS | HEART RATE: 61 BPM | DIASTOLIC BLOOD PRESSURE: 79 MMHG | BODY MASS INDEX: 27.49 KG/M2 | HEIGHT: 64 IN | SYSTOLIC BLOOD PRESSURE: 158 MMHG

## 2025-04-29 DIAGNOSIS — I25.10 CORONARY ARTERY DISEASE INVOLVING NATIVE CORONARY ARTERY OF NATIVE HEART WITHOUT ANGINA PECTORIS: ICD-10-CM

## 2025-04-29 DIAGNOSIS — Z87.898 H/O BRAIN TUMOR: ICD-10-CM

## 2025-04-29 DIAGNOSIS — I11.9 HYPERTENSIVE HEART DISEASE WITHOUT HEART FAILURE: ICD-10-CM

## 2025-04-29 DIAGNOSIS — I35.0 NONRHEUMATIC AORTIC VALVE STENOSIS: ICD-10-CM

## 2025-04-29 DIAGNOSIS — R00.2 PALPITATIONS: ICD-10-CM

## 2025-04-29 DIAGNOSIS — I45.2 BIFASCICULAR BLOCK: ICD-10-CM

## 2025-04-29 DIAGNOSIS — E78.2 MIXED HYPERLIPIDEMIA: ICD-10-CM

## 2025-04-29 DIAGNOSIS — I10 ESSENTIAL HYPERTENSION: ICD-10-CM

## 2025-04-29 DIAGNOSIS — Z95.1 S/P CABG X 2: ICD-10-CM

## 2025-04-29 DIAGNOSIS — I49.1 PREMATURE ATRIAL CONTRACTIONS: ICD-10-CM

## 2025-04-29 DIAGNOSIS — I11.9 HYPERTENSIVE HEART DISEASE WITHOUT HEART FAILURE: Primary | ICD-10-CM

## 2025-04-29 LAB
AORTIC ROOT: 4.3 CM
AORTIC VALVE MEAN VELOCITY: 9.7 M/S
AV LVOT MEAN GRADIENT: 1 MMHG
AV LVOT PEAK GRADIENT: 3 MMHG
AV MEAN PRESS GRAD SYS DOP V1V2: 4 MMHG
AV PEAK GRADIENT: 9 MMHG
AV VELOCITY RATIO: 0.54
AV VMAX SYS DOP: 1.47 M/S
BSA FOR ECHO PROCEDURE: 1.78 M2
DOP CALC AO VTI: 28.76 CM
DOP CALC LVOT PEAK VEL VTI: 15.41 CM
DOP CALC LVOT PEAK VEL: 0.8 M/S
E WAVE DECELERATION TIME: 207 MS
E/A RATIO: 1.11
FRACTIONAL SHORTENING: 24 (ref 28–44)
INTERVENTRICULAR SEPTUM IN DIASTOLE (PARASTERNAL SHORT AXIS VIEW): 1.4 CM
INTERVENTRICULAR SEPTUM: 1.4 CM (ref 0.6–1.1)
LAAS-AP2: 23.9 CM2
LAAS-AP4: 18.6 CM2
LEFT ATRIUM AREA SYSTOLE SINGLE PLANE A4C: 18.1 CM2
LEFT ATRIUM SIZE: 2.9 CM
LEFT ATRIUM VOLUME (MOD BIPLANE): 76 ML
LEFT ATRIUM VOLUME INDEX (MOD BIPLANE): 42.7 ML/M2
LEFT INTERNAL DIMENSION IN SYSTOLE: 3.7 CM (ref 2.1–4)
LEFT VENTRICULAR INTERNAL DIMENSION IN DIASTOLE: 4.9 CM (ref 3.5–6)
LEFT VENTRICULAR POSTERIOR WALL IN END DIASTOLE: 1.3 CM
LEFT VENTRICULAR STROKE VOLUME: 58 ML
LVSV (TEICH): 58 ML
MV E'TISSUE VEL-LAT: 8 CM/S
MV E'TISSUE VEL-SEP: 4 CM/S
MV PEAK A VEL: 0.76 M/S
MV PEAK E VEL: 84 CM/S
MV STENOSIS PRESSURE HALF TIME: 60 MS
MV VALVE AREA P 1/2 METHOD: 3.67
RIGHT ATRIUM AREA SYSTOLE A4C: 19.7 CM2
RIGHT VENTRICLE ID DIMENSION: 4 CM
SL CV LEFT ATRIUM LENGTH A2C: 5.6 CM
SL CV PED ECHO LEFT VENTRICLE DIASTOLIC VOLUME (MOD BIPLANE) 2D: 114 ML
SL CV PED ECHO LEFT VENTRICLE SYSTOLIC VOLUME (MOD BIPLANE) 2D: 57 ML
TR MAX PG: 28 MMHG
TR PEAK VELOCITY: 2.6 M/S
TRICUSPID ANNULAR PLANE SYSTOLIC EXCURSION: 1.5 CM
TRICUSPID VALVE PEAK REGURGITATION VELOCITY: 2.64 M/S

## 2025-04-29 PROCEDURE — 93306 TTE W/DOPPLER COMPLETE: CPT | Performed by: INTERNAL MEDICINE

## 2025-04-29 PROCEDURE — 93306 TTE W/DOPPLER COMPLETE: CPT

## 2025-04-29 NOTE — TELEPHONE ENCOUNTER
Spoke to patient, notified of results  Pt is agreeable to try jardiance 10mg. Pls send script to pharmacy on file. I will provide samples as well.    4 boxes  Lot#[id number] EXP# [id number]

## 2025-05-27 NOTE — TELEPHONE ENCOUNTER
Received call from pt.  He has been taking Jardiance 10 mg daily.  He reports no side effects.  He has run out of the samples.  He wants to confirm he should stay on the Jardiance, and if so he needs a prescription sent to [facility].

## 2025-05-28 DIAGNOSIS — I25.10 CORONARY ARTERY DISEASE INVOLVING NATIVE CORONARY ARTERY OF NATIVE HEART WITHOUT ANGINA PECTORIS: ICD-10-CM

## 2025-05-28 DIAGNOSIS — I49.1 PREMATURE ATRIAL CONTRACTIONS: ICD-10-CM

## 2025-05-28 RX ORDER — METOPROLOL SUCCINATE 50 MG/1
50 TABLET, EXTENDED RELEASE ORAL DAILY
Qty: 90 TABLET | Refills: 1 | Status: SHIPPED | OUTPATIENT
Start: 2025-05-28

## 2025-05-30 ENCOUNTER — TELEPHONE (OUTPATIENT)
Age: 85
End: 2025-05-30

## 2025-06-02 DIAGNOSIS — I10 ESSENTIAL HYPERTENSION: ICD-10-CM

## 2025-06-02 DIAGNOSIS — I25.10 CORONARY ARTERY DISEASE INVOLVING NATIVE CORONARY ARTERY OF NATIVE HEART WITHOUT ANGINA PECTORIS: ICD-10-CM

## 2025-06-02 DIAGNOSIS — I11.9 HYPERTENSIVE HEART DISEASE WITHOUT HEART FAILURE: ICD-10-CM

## 2025-06-09 NOTE — TELEPHONE ENCOUNTER
Received a call from Paul, following up on the Jardiance and [facility], Advised it was sent, recommend pt to follow up with [facility] home delievery.

## 2025-06-11 DIAGNOSIS — F41.9 ANXIETY: ICD-10-CM

## 2025-06-13 ENCOUNTER — TELEPHONE (OUTPATIENT)
Age: 85
End: 2025-06-13

## 2025-06-13 NOTE — TELEPHONE ENCOUNTER
[place] notifying pt we will place samples in front for him to     Providing 4 boxes   Lot#[id number] # [id number]

## 2025-06-13 NOTE — TELEPHONE ENCOUNTER
Pt returned the call.  Pt informed that samples are ready to be picked up at the [place] office.  Pt verbalized understanding and has no further questions at this time.

## 2025-06-13 NOTE — TELEPHONE ENCOUNTER
Pt called stating that he has been out of jardiance for 2 weeks because of insurance reasons. He is wondering if he could have jardiance samples until he is able to figure out what is going on?    Please follow up with pt

## 2025-06-21 DIAGNOSIS — I10 ESSENTIAL HYPERTENSION: ICD-10-CM

## 2025-06-22 RX ORDER — AMLODIPINE BESYLATE 2.5 MG/1
2.5 TABLET ORAL DAILY
Qty: 90 TABLET | Refills: 1 | Status: SHIPPED | OUTPATIENT
Start: 2025-06-22

## 2025-06-23 ENCOUNTER — NURSE TRIAGE (OUTPATIENT)
Age: 85
End: 2025-06-23

## 2025-06-23 DIAGNOSIS — I11.9 HYPERTENSIVE HEART DISEASE WITHOUT HEART FAILURE: ICD-10-CM

## 2025-06-23 DIAGNOSIS — I25.10 CORONARY ARTERY DISEASE INVOLVING NATIVE CORONARY ARTERY OF NATIVE HEART WITHOUT ANGINA PECTORIS: ICD-10-CM

## 2025-06-23 DIAGNOSIS — I10 ESSENTIAL HYPERTENSION: ICD-10-CM

## 2025-06-23 NOTE — TELEPHONE ENCOUNTER
"Reason for Disposition   Prescription not at pharmacy and was prescribed by PCP recently  (Exception: triager has access to EMR and prescription is recorded there. Go to Home Care and confirm for pharmacy.)     Resubmit Jardiance per protocol    Answer Assessment - Initial Assessment Questions  1. NAME of MEDICINE: \"What medicine(s) are you calling about?\"      jardiance  2. QUESTION: \"What is your question?\" (e.g., double dose of medicine, side effect)      [facility] never received renewal  3. PRESCRIBER: \"Who prescribed the medicine?\" Reason: if prescribed by specialist, call should be referred to that group.      Peter Alvarez    Protocols used: Medication Question Call-Adult-[place]    "

## 2025-06-28 DIAGNOSIS — N40.1 BENIGN PROSTATIC HYPERPLASIA WITH LOWER URINARY TRACT SYMPTOMS, SYMPTOM DETAILS UNSPECIFIED: ICD-10-CM

## 2025-06-28 DIAGNOSIS — Z85.51 HX OF BLADDER CANCER: ICD-10-CM

## 2025-07-01 ENCOUNTER — TELEPHONE (OUTPATIENT)
Dept: FAMILY MEDICINE CLINIC | Facility: CLINIC | Age: 85
End: 2025-07-01

## 2025-07-01 DIAGNOSIS — I25.10 CORONARY ARTERY DISEASE INVOLVING NATIVE CORONARY ARTERY OF NATIVE HEART WITHOUT ANGINA PECTORIS: ICD-10-CM

## 2025-07-01 DIAGNOSIS — E78.2 MIXED HYPERLIPIDEMIA: ICD-10-CM

## 2025-07-01 RX ORDER — TAMSULOSIN HYDROCHLORIDE 0.4 MG/1
CAPSULE ORAL
Qty: 90 CAPSULE | Refills: 0 | Status: SHIPPED | OUTPATIENT
Start: 2025-07-01

## 2025-07-02 ENCOUNTER — RA CDI HCC (OUTPATIENT)
Dept: OTHER | Facility: HOSPITAL | Age: 85
End: 2025-07-02

## 2025-07-02 PROBLEM — I25.2 HISTORY OF MI (MYOCARDIAL INFARCTION): Status: ACTIVE | Noted: 2025-07-02

## 2025-07-03 RX ORDER — ATORVASTATIN CALCIUM 80 MG/1
80 TABLET, FILM COATED ORAL DAILY
Qty: 30 TABLET | Refills: 0 | Status: SHIPPED | OUTPATIENT
Start: 2025-07-03

## 2025-07-11 ENCOUNTER — OFFICE VISIT (OUTPATIENT)
Dept: FAMILY MEDICINE CLINIC | Facility: CLINIC | Age: 85
End: 2025-07-11
Payer: COMMERCIAL

## 2025-07-11 VITALS
BODY MASS INDEX: 26.94 KG/M2 | HEIGHT: 64 IN | WEIGHT: 157.8 LBS | DIASTOLIC BLOOD PRESSURE: 80 MMHG | RESPIRATION RATE: 18 BRPM | SYSTOLIC BLOOD PRESSURE: 120 MMHG | HEART RATE: 64 BPM | TEMPERATURE: 97 F

## 2025-07-11 DIAGNOSIS — Z12.5 SCREENING FOR PROSTATE CANCER: ICD-10-CM

## 2025-07-11 DIAGNOSIS — N40.1 BENIGN PROSTATIC HYPERPLASIA WITH LOWER URINARY TRACT SYMPTOMS, SYMPTOM DETAILS UNSPECIFIED: ICD-10-CM

## 2025-07-11 DIAGNOSIS — F32.0 MAJOR DEPRESSIVE DISORDER, SINGLE EPISODE, MILD (HCC): ICD-10-CM

## 2025-07-11 DIAGNOSIS — M81.0 AGE-RELATED OSTEOPOROSIS WITHOUT CURRENT PATHOLOGICAL FRACTURE: ICD-10-CM

## 2025-07-11 DIAGNOSIS — M25.511 CHRONIC RIGHT SHOULDER PAIN: ICD-10-CM

## 2025-07-11 DIAGNOSIS — E78.2 MIXED HYPERLIPIDEMIA: ICD-10-CM

## 2025-07-11 DIAGNOSIS — Z00.00 MEDICARE ANNUAL WELLNESS VISIT, SUBSEQUENT: Primary | ICD-10-CM

## 2025-07-11 DIAGNOSIS — F41.9 ANXIETY: ICD-10-CM

## 2025-07-11 DIAGNOSIS — I10 ESSENTIAL HYPERTENSION: ICD-10-CM

## 2025-07-11 DIAGNOSIS — G89.29 CHRONIC RIGHT SHOULDER PAIN: ICD-10-CM

## 2025-07-11 PROCEDURE — G0439 PPPS, SUBSEQ VISIT: HCPCS | Performed by: FAMILY MEDICINE

## 2025-07-11 NOTE — PROGRESS NOTES
Name: Paul Lakhani      : 1940      MRN: [id number]  Encounter Provider: Frank Lombardi, DO  Encounter Date: 2025   Encounter department: [facility]  :  Assessment & Plan  Medicare annual wellness visit, subsequent         Essential hypertension  stable  Orders:  •  CBC; Future  •  CBC; Future  •  Comprehensive metabolic panel; Future  •  Lipid Panel with Direct LDL reflex; Future    Mixed hyperlipidemia    Orders:  •  CBC; Future  •  Comprehensive metabolic panel; Future  •  Lipid Panel with Direct LDL reflex; Future  •  CBC; Future  •  Comprehensive metabolic panel; Future  •  Lipid Panel with Direct LDL reflex; Future    Benign prostatic hyperplasia with lower urinary tract symptoms, symptom details unspecified         Screening for prostate cancer    Orders:  •  PSA, Total Screen; Future    Major depressive disorder, single episode, mild (HCC)  Pt would like to leave meds as they are       Anxiety         Age-related osteoporosis without current pathological fracture    Orders:  •  DXA bone density spine hip and pelvis; Future    Chronic right shoulder pain  Shoulder torn 7 years ago - was advised at time to follow  Pain is getting worse.  I ordered ray - will see result - but examines like advanced arthritis  Orders:  •  XR shoulder 2+ vw right; Future  •  Ambulatory Referral to Orthopedic Surgery; Future       Preventive health issues were discussed with patient, and age appropriate screening tests were ordered as noted in patient's After Visit Summary. Personalized health advice and appropriate referrals for health education or preventive services given if needed, as noted in patient's After Visit Summary.    History of Present Illness     Pt is sched for an AWV    Pt states 7 years ago he tore his rotator cuff in shoulder, two years ago he fell down on same shoulder - states the area has been bothering him more and more.  Prt was seen by ortho and was advised to follow at the  time  Pt states the pain is worse       Patient Care Team:  Frank Lombardi, DO as PCP - General (Family Medicine)  Frank Lombardi, DO as PCP - PCP-[facility] ([facility])  Bebeto Carias,  as Consulting Physician (Sports Medicine)  Hussain Gonzalez, DO as Consulting Physician (Pain Medicine)  Sean Alvarez MD as Consulting Physician (Cardiology)  Turner Liriano MD as Consulting Physician (Dermatology)    Review of Systems  Medical History Reviewed by provider this encounter:       Annual Wellness Visit Questionnaire   Paul is here for his Subsequent Wellness visit. Last Medicare Wellness visit information reviewed, patient interviewed, no change since last AWV.     Health Risk Assessment:   Patient rates overall health as good. Patient feels that their physical health rating is same. Patient is dissatisfied with their life. Eyesight was rated as slightly worse. Hearing was rated as slightly worse. Patient feels that their emotional and mental health rating is slightly worse. Patients states they are sometimes angry. Patient states they are sometimes unusually tired/fatigued. Pain experienced in the last 7 days has been none. Patient states that he has experienced no weight loss or gain in last 6 months.     Depression Screening:   PHQ-9 Score: 4      Fall Risk Screening:   In the past year, patient has experienced: no history of falling in past year      Home Safety:  Patient does not have trouble with stairs inside or outside of their home. Patient has working smoke alarms and has working carbon monoxide detector. Home safety hazards include: none.     Nutrition:   Current diet is Regular.     Medications:   Patient is currently taking over-the-counter supplements. OTC medications include: see medication list. Patient is able to manage medications.     Activities of Daily Living (ADLs)/Instrumental Activities of Daily Living (IADLs):   Walk and transfer into and out of bed and chair?: Yes  Dress  and groom yourself?: Yes    Bathe or shower yourself?: Yes    Feed yourself? Yes  Do your laundry/housekeeping?: Yes  Manage your money, pay your bills and track your expenses?: Yes  Make your own meals?: Yes    Do your own shopping?: Yes    Durable Medical Equipment Suppliers  none    Previous Hospitalizations:   Any hospitalizations or ED visits within the last 12 months?: No      Advance Care Planning:   Living will: Yes    Durable POA for healthcare: Yes    Advanced directive: Yes      Cognitive Screening:   Provider or family/friend/caregiver concerned regarding cognition?: No    Preventive Screenings      Cardiovascular Screening:    General: Screening Not Indicated, History Lipid Disorder and Risks and Benefits Discussed    Due for: Lipid Panel      Diabetes Screening:     General: Screening Current and Risks and Benefits Discussed    Due for: Blood Glucose      Colorectal Cancer Screening:     General: Patient Declines      Prostate Cancer Screening:    General: Screening Not Indicated and Risks and Benefits Discussed    Due for: PSA      Osteoporosis Screening:    General: Screening Not Indicated, History Osteoporosis and Risks and Benefits Discussed    Due for: Bone Density Ultrasound      Abdominal Aortic Aneurysm (AAA) Screening:    Risk factors include: tobacco use        General: Screening Not Indicated      Lung Cancer Screening:     General: Screening Not Indicated      Hepatitis C Screening:    General: Screening Current    Screening, Brief Intervention, and Referral to Treatment (SBIRT)     Screening  Typical number of drinks in a day: 2  Typical number of drinks in a week: 14  Interpretation: Low risk drinking behavior.    AUDIT-C Screenin) How often did you have a drink containing alcohol in the past year? 4 or more times a week  2) How many drinks did you have on a typical day when you were drinking in the past year? 1 to 2  3) How often did you have 6 or more drinks on one occasion in the  past year? never    AUDIT-C Score: 4  Interpretation: Score 4-12 (male): POSITIVE screen for alcohol misuse    AUDIT Screenin) How often during the last year have you found that you were not able to stop drinking once you had started? 0 - never  5) How often during the last year have you failed to do what was normally expected from you because of drinking? 0 - never  6) How often during the last year have you needed a first drink in the morning to get yourself going after a heavy drinking session? 0 - never  7) How often during the last year have you had a feeling of guilt or remorse after drinking? 0 - never  8) How often during the last year have you been unable to remember what happened the night before because you had been drinking? 0 - never  9) Have you or someone else been injured as a result of your drinking? 0 - no  10) Has a relative or friend or a doctor or another health worker been concerned about your drinking or suggested you cut down? 0 - no    AUDIT Score: 4  Interpretation: Low risk alcohol consumption    Single Item Drug Screening:  How often have you used an illegal drug (including marijuana) or a prescription medication for non-medical reasons in the past year? never    Single Item Drug Screen Score: 0  Interpretation: Negative screen for possible drug use disorder    Brief Intervention  Alcohol & drug use screenings were reviewed. No concerns regarding substance use disorder identified.     Social Drivers of Health     Financial Resource Strain: Low Risk  (2022)    Overall Financial Resource Strain (CARDI)    • Difficulty of Paying Living Expenses: Not hard at all   Food Insecurity: No Food Insecurity (2025)    Nursing - Inadequate Food Risk Classification    • Worried About Running Out of Food in the Last Year: Never true    • Ran Out of Food in the Last Year: Never true   Transportation Needs: No Transportation Needs (2025)    PRAPARE - Transportation    • Lack of  "Transportation (Medical): No    • Lack of Transportation (Non-Medical): No   Housing Stability: Low Risk  (7/11/2025)    Housing Stability Vital Sign    • Unable to Pay for Housing in the Last Year: No    • Number of Times Moved in the Last Year: 0    • Homeless in the Last Year: No   Utilities: Not At Risk (7/11/2025)    [facility] Utilities    • Threatened with loss of utilities: No     No results found.    Objective   /80   Pulse 64   Temp (!) 97 °F (36.1 °C)   Resp 18   Ht 5' 4\" (1.626 m)   Wt 71.6 kg (157 lb 12.8 oz)   BMI 27.09 kg/m²     Physical Exam    "

## 2025-07-11 NOTE — ASSESSMENT & PLAN NOTE
Orders:  •  CBC; Future  •  Comprehensive metabolic panel; Future  •  Lipid Panel with Direct LDL reflex; Future  •  CBC; Future  •  Comprehensive metabolic panel; Future  •  Lipid Panel with Direct LDL reflex; Future

## 2025-07-11 NOTE — ASSESSMENT & PLAN NOTE
stable  Orders:  •  CBC; Future  •  CBC; Future  •  Comprehensive metabolic panel; Future  •  Lipid Panel with Direct LDL reflex; Future

## 2025-07-11 NOTE — PATIENT INSTRUCTIONS
Medicare Preventive Visit Patient Instructions  Thank you for completing your Welcome to Medicare Visit or Medicare Annual Wellness Visit today. Your next wellness visit will be due in one year (7/12/2026).  The screening/preventive services that you may require over the next 5-10 years are detailed below. Some tests may not apply to you based off risk factors and/or age. Screening tests ordered at today's visit but not completed yet may show as past due. Also, please note that scanned in results may not display below.  Preventive Screenings:  Service Recommendations Previous Testing/Comments   Colorectal Cancer Screening  Colonoscopy    Fecal Occult Blood Test (FOBT)/Fecal Immunochemical Test (FIT)  Fecal DNA/Cologuard Test  Flexible Sigmoidoscopy Age: 45-75 years old   Colonoscopy: every 10 years (May be performed more frequently if at higher risk)  OR  FOBT/FIT: every 1 year  OR  Cologuard: every 3 years  OR  Sigmoidoscopy: every 5 years  Screening may be recommended earlier than age 45 if at higher risk for colorectal cancer. Also, an individualized decision between you and your healthcare provider will decide whether screening between the ages of 76-85 would be appropriate. Colonoscopy: Not on file  FOBT/FIT: Not on file  Cologuard: Not on file  Sigmoidoscopy: Not on file          Prostate Cancer Screening Individualized decision between patient and health care provider in men between ages of 55-69   Medicare will cover every 12 months beginning on the day after your 50th birthday PSA: 0.14 ng/mL     Screening Not Indicated     Hepatitis C Screening Once for adults born between 1945 and 1965  More frequently in patients at high risk for Hepatitis C Hep C Antibody: 09/27/2022    Screening Current   Diabetes Screening 1-2 times per year if you're at risk for diabetes or have pre-diabetes Fasting glucose: 102 mg/dL (12/3/2024)  A1C: No results in last 5 years (No results in last 5 years)  Screening Current    Cholesterol Screening Once every 5 years if you don't have a lipid disorder. May order more often based on risk factors. Lipid panel: 12/03/2024  Screening Not Indicated  History Lipid Disorder      Other Preventive Screenings Covered by Medicare:  Abdominal Aortic Aneurysm (AAA) Screening: covered once if your at risk. You're considered to be at risk if you have a family history of AAA or a male between the age of 65-75 who smoking at least 100 cigarettes in your lifetime.  Lung Cancer Screening: covers low dose CT scan once per year if you meet all of the following conditions: (1) Age 55-77; (2) No signs or symptoms of lung cancer; (3) Current smoker or have quit smoking within the last 15 years; (4) You have a tobacco smoking history of at least 20 pack years (packs per day x number of years you smoked); (5) You get a written order from a healthcare provider.  Glaucoma Screening: covered annually if you're considered high risk: (1) You have diabetes OR (2) Family history of glaucoma OR (3)  aged 50 and older OR (4)  American aged 65 and older  Osteoporosis Screening: covered every 2 years if you meet one of the following conditions: (1) Have a vertebral abnormality; (2) On glucocorticoid therapy for more than 3 months; (3) Have primary hyperparathyroidism; (4) On osteoporosis medications and need to assess response to drug therapy.  HIV Screening: covered annually if you're between the age of 15-65. Also covered annually if you are younger than 15 and older than 65 with risk factors for HIV infection. For pregnant patients, it is covered up to 3 times per pregnancy.    Immunizations:  Immunization Recommendations   Influenza Vaccine Annual influenza vaccination during flu season is recommended for all persons aged >= 6 months who do not have contraindications   Pneumococcal Vaccine   * Pneumococcal conjugate vaccine = PCV13 (Prevnar 13), PCV15 (Vaxneuvance), PCV20 (Prevnar 20)  *  Pneumococcal polysaccharide vaccine = PPSV23 (Pneumovax) Adults 19-65 yo with certain risk factors or if 65+ yo  If never received any pneumonia vaccine: recommend Prevnar 20 (PCV20)  Give PCV20 if previously received 1 dose of PCV13 or PPSV23   Hepatitis B Vaccine 3 dose series if at intermediate or high risk (ex: diabetes, end stage renal disease, liver disease)   Respiratory syncytial virus (RSV) Vaccine - COVERED BY MEDICARE PART D  * RSVPreF3 (Arexvy) CDC recommends that adults 60 years of age and older may receive a single dose of RSV vaccine using shared clinical decision-making (SCDM)   Tetanus (Td) Vaccine - COST NOT COVERED BY MEDICARE PART B Following completion of primary series, a booster dose should be given every 10 years to maintain immunity against tetanus. Td may also be given as tetanus wound prophylaxis.   Tdap Vaccine - COST NOT COVERED BY MEDICARE PART B Recommended at least once for all adults. For pregnant patients, recommended with each pregnancy.   Shingles Vaccine (Shingrix) - COST NOT COVERED BY MEDICARE PART B  2 shot series recommended in those 19 years and older who have or will have weakened immune systems or those 50 years and older     Health Maintenance Due:      Topic Date Due   • Hepatitis C Screening  Completed     Immunizations Due:      Topic Date Due   • COVID-19 Vaccine (4 - 2024-25 season) 09/01/2024   • Influenza Vaccine (1) 09/01/2025     Advance Directives   What are advance directives?  Advance directives are legal documents that state your wishes and plans for medical care. These plans are made ahead of time in case you lose your ability to make decisions for yourself. Advance directives can apply to any medical decision, such as the treatments you want, and if you want to donate organs.   What are the types of advance directives?  There are many types of advance directives, and each state has rules about how to use them. You may choose a combination of any of the  following:  Living will:  This is a written record of the treatment you want. You can also choose which treatments you do not want, which to limit, and which to stop at a certain time. This includes surgery, medicine, IV fluid, and tube feedings.   Durable power of  for healthcare (DPAHC):  This is a written record that states who you want to make healthcare choices for you when you are unable to make them for yourself. This person, called a proxy, is usually a family member or a friend. You may choose more than 1 proxy.  Do not resuscitate (DNR) order:  A DNR order is used in case your heart stops beating or you stop breathing. It is a request not to have certain forms of treatment, such as CPR. A DNR order may be included in other types of advance directives.  Medical directive:  This covers the care that you want if you are in a coma, near death, or unable to make decisions for yourself. You can list the treatments you want for each condition. Treatment may include pain medicine, surgery, blood transfusions, dialysis, IV or tube feedings, and a ventilator (breathing machine).  Values history:  This document has questions about your views, beliefs, and how you feel and think about life. This information can help others choose the care that you would choose.  Why are advance directives important?  An advance directive helps you control your care. Although spoken wishes may be used, it is better to have your wishes written down. Spoken wishes can be misunderstood, or not followed. Treatments may be given even if you do not want them. An advance directive may make it easier for your family to make difficult choices about your care.   Weight Management   Why it is important to manage your weight:  Being overweight increases your risk of health conditions such as heart disease, high blood pressure, type 2 diabetes, and certain types of cancer. It can also increase your risk for osteoarthritis, sleep apnea, and  other respiratory problems. Aim for a slow, steady weight loss. Even a small amount of weight loss can lower your risk of health problems.  How to lose weight safely:  A safe and healthy way to lose weight is to eat fewer calories and get regular exercise. You can lose up about 1 pound a week by decreasing the number of calories you eat by 500 calories each day.   Healthy meal plan for weight management:  A healthy meal plan includes a variety of foods, contains fewer calories, and helps you stay healthy. A healthy meal plan includes the following:  Eat whole-grain foods more often.  A healthy meal plan should contain fiber. Fiber is the part of grains, fruits, and vegetables that is not broken down by your body. Whole-grain foods are healthy and provide extra fiber in your diet. Some examples of whole-grain foods are whole-wheat breads and pastas, oatmeal, brown rice, and bulgur.  Eat a variety of vegetables every day.  Include dark, leafy greens such as spinach, kale, sumit greens, and mustard greens. Eat yellow and orange vegetables such as carrots, sweet potatoes, and winter squash.   Eat a variety of fruits every day.  Choose fresh or canned fruit (canned in its own juice or light syrup) instead of juice. Fruit juice has very little or no fiber.  Eat low-fat dairy foods.  Drink fat-free (skim) milk or 1% milk. Eat fat-free yogurt and low-fat cottage cheese. Try low-fat cheeses such as mozzarella and other reduced-fat cheeses.  Choose meat and other protein foods that are low in fat.  Choose beans or other legumes such as split peas or lentils. Choose fish, skinless poultry (chicken or turkey), or lean cuts of red meat (beef or pork). Before you cook meat or poultry, cut off any visible fat.   Use less fat and oil.  Try baking foods instead of frying them. Add less fat, such as margarine, sour cream, regular salad dressing and mayonnaise to foods. Eat fewer high-fat foods. Some examples of high-fat foods  "include french fries, doughnuts, ice cream, and cakes.  Eat fewer sweets.  Limit foods and drinks that are high in sugar. This includes candy, cookies, regular soda, and sweetened drinks.  Exercise:  Exercise at least 30 minutes per day on most days of the week. Some examples of exercise include walking, biking, dancing, and swimming. You can also fit in more physical activity by taking the stairs instead of the elevator or parking farther away from stores. Ask your healthcare provider about the best exercise plan for you.   Alcohol Use and Your Health    Drinking too much can harm your health.  Excessive alcohol use leads to about 88,000 death in the [place] each year, and shortens the life of those who diet by almost 30 years.  Further, excessive drinking cost the economy $249 billion in 2010.  Most excessive drinkers are not alcohol dependent.    Excessive alcohol use has immediate effects that increase the risk of many harmful health conditions.  These are most often the result of binge drinking.  Over time, excessive alcohol use can lead to the development of chronic diseases and other series health problems.    What is considered a \"drink\"?        Excessive alcohol use includes:  Binge Drinking: For women, 4 or more drinks consumed on one occasion. For men, 5 or more drinks consumed on one occasion.  Heavy Drinking: For women, 8 or more drinks per week. For men, 15 or more drinks per week  Any alcohol used by pregnant women  Any alcohol used by those under the age of 21 years    If you choose to drink, do so in moderation:  Do not drink at all if you are under the age of 21, or if you are or may be pregnant, or have health problems that could be made worse by drinking.  For women, up to 1 drink per day  For men, up to 2 drinks a day    No one should begin drinking or drink more frequently based on potential health benefits    Short-Term Health Risks:  Injuries: motor vehicle crashes, falls, drownings, " burns  Violence: homicide, suicide, sexual assault, intimate partner violence  Alcohol poisoning  Reproductive health: risky sexual behaviors, unintended prengnacy, sexually transmitted diseases, miscarriage, stillbirth, fetal alcohol syndrome    Long-Term Health Risks:  Chronic diseases: high blood pressure, heart disease, stroke, liver disease, digestive problems  Cancers: breast, mouth and throat, liver, colon  Learning and memory problems: dementia, poor school performance  Mental health: depression, anxiety, insomnia  Social problems: lost productivity, family problems, unemployment  Alcohol dependence    For support and more information:  Substance Abuse and Mental Health Services Administration  [address]  [place] [address]  Web Address: http://www.sama.gov    Alcoholics Anonymous        Web Address: http://www.aa.org    https://www.cdc.gov/alcohol/fact-sheets/alcohol-use.htm   © Copyright [employer] 2018 Information is for End User's use only and may not be sold, redistributed or otherwise used for commercial purposes. All illustrations and images included in CareNotes® are the copyrighted property of [employer] or [employer]

## 2025-07-15 ENCOUNTER — HOSPITAL ENCOUNTER (OUTPATIENT)
Dept: RADIOLOGY | Facility: HOSPITAL | Age: 85
Discharge: HOME | End: 2025-07-15
Payer: COMMERCIAL

## 2025-07-15 ENCOUNTER — APPOINTMENT (OUTPATIENT)
Dept: LAB | Facility: HOSPITAL | Age: 85
End: 2025-07-15
Attending: FAMILY MEDICINE
Payer: COMMERCIAL

## 2025-07-15 DIAGNOSIS — I10 ESSENTIAL HYPERTENSION: ICD-10-CM

## 2025-07-15 DIAGNOSIS — E78.2 MIXED HYPERLIPIDEMIA: ICD-10-CM

## 2025-07-15 DIAGNOSIS — M25.511 CHRONIC RIGHT SHOULDER PAIN: ICD-10-CM

## 2025-07-15 DIAGNOSIS — Z12.5 SCREENING FOR PROSTATE CANCER: ICD-10-CM

## 2025-07-15 DIAGNOSIS — G89.29 CHRONIC RIGHT SHOULDER PAIN: ICD-10-CM

## 2025-07-15 LAB
ALBUMIN SERPL BCG-MCNC: 4 G/DL (ref 3.5–5)
ALP SERPL-CCNC: 60 U/L (ref 34–104)
ALT SERPL W P-5'-P-CCNC: 19 U/L (ref 7–52)
ANION GAP SERPL CALCULATED.3IONS-SCNC: 8 MMOL/L (ref 4–13)
AST SERPL W P-5'-P-CCNC: 20 U/L (ref 13–39)
BILIRUB SERPL-MCNC: 0.82 MG/DL (ref 0.2–1)
BUN SERPL-MCNC: 19 MG/DL (ref 5–25)
CALCIUM SERPL-MCNC: 9.3 MG/DL (ref 8.4–10.2)
CHLORIDE SERPL-SCNC: 98 MMOL/L (ref 96–108)
CHOLEST SERPL-MCNC: 160 MG/DL (ref ?–200)
CO2 SERPL-SCNC: 27 MMOL/L (ref 21–32)
CREAT SERPL-MCNC: 1.01 MG/DL (ref 0.6–1.3)
ERYTHROCYTE [DISTWIDTH] IN BLOOD BY AUTOMATED COUNT: 13 % (ref 11.6–15.1)
GFR SERPL CREATININE-BSD FRML MDRD: 68 ML/MIN/1.73SQ M
GLUCOSE P FAST SERPL-MCNC: 96 MG/DL (ref 65–99)
HCT VFR BLD AUTO: 43 % (ref 36.5–49.3)
HDLC SERPL-MCNC: 57 MG/DL
HGB BLD-MCNC: 14.2 G/DL (ref 12–17)
LDLC SERPL CALC-MCNC: 74 MG/DL (ref 0–100)
MCH RBC QN AUTO: 33.1 PG (ref 26.8–34.3)
MCHC RBC AUTO-ENTMCNC: 33 G/DL (ref 31.4–37.4)
MCV RBC AUTO: 100 FL (ref 82–98)
PLATELET # BLD AUTO: 206 THOUSANDS/UL (ref 149–390)
PMV BLD AUTO: 9.9 FL (ref 8.9–12.7)
POTASSIUM SERPL-SCNC: 4.6 MMOL/L (ref 3.5–5.3)
PROT SERPL-MCNC: 7.1 G/DL (ref 6.4–8.4)
PSA SERPL-MCNC: 0.26 NG/ML (ref 0–4)
RBC # BLD AUTO: 4.29 MILLION/UL (ref 3.88–5.62)
SODIUM SERPL-SCNC: 133 MMOL/L (ref 135–147)
TRIGL SERPL-MCNC: 146 MG/DL (ref ?–150)
WBC # BLD AUTO: 9.32 THOUSAND/UL (ref 4.31–10.16)

## 2025-07-15 PROCEDURE — 80053 COMPREHEN METABOLIC PANEL: CPT

## 2025-07-15 PROCEDURE — 73030 X-RAY EXAM OF SHOULDER: CPT

## 2025-07-15 PROCEDURE — 80061 LIPID PANEL: CPT

## 2025-07-15 PROCEDURE — 36415 COLL VENOUS BLD VENIPUNCTURE: CPT

## 2025-07-15 PROCEDURE — G0103 PSA SCREENING: HCPCS

## 2025-07-15 PROCEDURE — 85027 COMPLETE CBC AUTOMATED: CPT

## 2025-07-17 ENCOUNTER — HOSPITAL ENCOUNTER (OUTPATIENT)
Dept: RADIOLOGY | Facility: HOSPITAL | Age: 85
Discharge: HOME | End: 2025-07-17
Payer: COMMERCIAL

## 2025-07-17 DIAGNOSIS — M81.0 AGE-RELATED OSTEOPOROSIS WITHOUT CURRENT PATHOLOGICAL FRACTURE: ICD-10-CM

## 2025-07-17 PROCEDURE — 77080 DXA BONE DENSITY AXIAL: CPT

## 2025-07-21 DIAGNOSIS — F41.9 ANXIETY: ICD-10-CM

## 2025-07-22 DIAGNOSIS — M12.811 ROTATOR CUFF TEAR ARTHROPATHY OF RIGHT SHOULDER: Primary | ICD-10-CM

## 2025-07-22 DIAGNOSIS — M75.101 ROTATOR CUFF TEAR ARTHROPATHY OF RIGHT SHOULDER: Primary | ICD-10-CM

## 2025-07-22 DIAGNOSIS — M19.011 OSTEOARTHRITIS OF GLENOHUMERAL JOINT, RIGHT: ICD-10-CM

## 2025-07-22 PROCEDURE — 20610 DRAIN/INJ JOINT/BURSA W/O US: CPT | Performed by: ORTHOPAEDIC SURGERY

## 2025-07-22 PROCEDURE — 99214 OFFICE O/P EST MOD 30 MIN: CPT | Performed by: ORTHOPAEDIC SURGERY

## 2025-07-22 RX ORDER — LIDOCAINE HYDROCHLORIDE 10 MG/ML
4 INJECTION, SOLUTION INFILTRATION; PERINEURAL
Status: COMPLETED | OUTPATIENT
Start: 2025-07-22 | End: 2025-07-22

## 2025-07-22 RX ORDER — DEXAMETHASONE SODIUM PHOSPHATE 10 MG/ML
40 INJECTION, SOLUTION INTRAMUSCULAR; INTRAVENOUS
Status: COMPLETED | OUTPATIENT
Start: 2025-07-22 | End: 2025-07-22

## 2025-07-22 RX ADMIN — LIDOCAINE HYDROCHLORIDE 4 ML: 10 INJECTION, SOLUTION INFILTRATION; PERINEURAL at 14:00

## 2025-07-22 RX ADMIN — DEXAMETHASONE SODIUM PHOSPHATE 40 MG: 10 INJECTION, SOLUTION INTRAMUSCULAR; INTRAVENOUS at 14:00

## 2025-07-22 NOTE — PROGRESS NOTES
Patient Name: Paul Lakhani      : 1940       MRN: [id number]   Encounter Provider: Bebeto Carias DO   Encounter Date: 25  Encounter department: [facility]         Assessment & Plan  Rotator cuff tear arthropathy of right shoulder  Paul and I reviewed his x-rays together.  He does have evidence of mild to moderate glenohumeral osteoarthritis with a high riding humeral head indicating rotator cuff arthropathy.  He does not have a known history of a rotator cuff tear identified 7-8 years ago.  This correlates well with my examination as he does have significant weakness to abduction.  We discussed treatment options moving forward.  Treatment options include activity modification, OTC medications, steroid injection to reduce inflammation, physical therapy and ultimately he may require a reverse total shoulder replacement.  Paul states he has no plan in his future for surgery and would like to discuss a steroid injection.  After discussing the risks and benefits he was amenable to proceed.  He tolerated the procedure well and post injection instructions were provided.  Today he received a subacromial injection.  If he does not find relief we can consider a glenohumeral injection under ultrasound guidance in the future.  He verbalized an understanding and had no further questions.  Orders:    Large joint arthrocentesis: R subacromial bursa    Osteoarthritis of glenohumeral joint, right    Orders:    Ambulatory Referral to Orthopedic Surgery       Plan:      Follow-up:  No follow-ups on file.     _____________________________________________________  CHIEF COMPLAINT:  Chief Complaint   Patient presents with    Right Shoulder - Pain         SUBJECTIVE:  Paul Lakhani is a 84 y.o. male who presents today for evaluation of his right shoulder.  Paul states he has had shoulder pain for many years but it has worsened of late.  He describes an intermittent mild to moderate  "global ache about the shoulder and weakness with overhead activities.  He has attempted to strengthen his shoulder on his own but unfortunately he is failing to find progress.  He denies radiating pain or distal paresthesias.  He denies crepitus. Paul states he has a known history of a rotator cuff tear identified by another provider 7-8 years ago.    PAST MEDICAL HISTORY:  Past Medical History[1]    PAST SURGICAL HISTORY:  Past Surgical History[2]    FAMILY HISTORY:  Family History[3]    SOCIAL HISTORY:  Social History[4]    MEDICATIONS:  Current Medications[5]    ALLERGIES:  Allergies[6]    LABS:  HgA1c: No results found for: \"HGBA1C\"  BMP:   Lab Results   Component Value Date    CALCIUM 9.3 07/15/2025    K 4.6 07/15/2025    CO2 27 07/15/2025    CL 98 07/15/2025    BUN 19 07/15/2025    CREATININE 1.01 07/15/2025     CBC: No components found for: \"CBC\"    _____________________________________________________  Review of Systems   Constitutional:  Negative for chills, fever and unexpected weight change.   HENT:  Negative for hearing loss, nosebleeds and sore throat.    Eyes:  Negative for pain, redness and visual disturbance.   Respiratory:  Negative for cough, shortness of breath and wheezing.    Cardiovascular:  Negative for chest pain, palpitations and leg swelling.   Gastrointestinal:  Negative for abdominal pain, nausea and vomiting.   Endocrine: Negative for polyphagia and polyuria.   Genitourinary:  Negative for dysuria and hematuria.   Musculoskeletal:         See HPI   Skin:  Negative for rash and wound.   Neurological:  Negative for dizziness, numbness and headaches.   Psychiatric/Behavioral:  Negative for decreased concentration and suicidal ideas. The patient is not nervous/anxious.         Right Shoulder Exam     Range of Motion   Active abduction:  90   External rotation:  70   Forward flexion:  90   Internal rotation 0 degrees:  L5     Muscle Strength   Abduction: 4/5   Internal rotation: 5/5 " "  External rotation: 4/5   Supraspinatus: 3/5   Subscapularis: 5/5     Tests   Landaverde test: positive  Impingement: positive    Other   Erythema: absent  Scars: absent  Sensation: normal  Pulse: present (2+ radial)      Left Shoulder Exam     Muscle Strength   Abduction: 5/5   Internal rotation: 5/5   External rotation: 5/5               Physical Exam  Vitals reviewed.   Constitutional:       Appearance: He is well-developed.   HENT:      Head: Normocephalic and atraumatic.     Eyes:      General:         Right eye: No discharge.         Left eye: No discharge.      Conjunctiva/sclera: Conjunctivae normal.       Cardiovascular:      Rate and Rhythm: Regular rhythm.   Pulmonary:      Effort: Pulmonary effort is normal. No respiratory distress.     Musculoskeletal:      Cervical back: Normal range of motion and neck supple.      Comments: As noted in the HPI.     Skin:     General: Skin is warm and dry.     Neurological:      Mental Status: He is alert and oriented to person, place, and time.     Psychiatric:         Behavior: Behavior normal.        _____________________________________________________  STUDIES REVIEWED:  I personally reviewed the pertinent images and my independent interpretation is as follows:  X-rays ordered by Dr. Lombardi and performed on July 15, 2025 were reviewed and show evidence of mild to moderate glenohumeral osteoarthritis with a high riding humeral head.  There is mild acromioclavicular osteoarthritis.  There is no evidence of acute fracture.    PROCEDURES PERFORMED:  Large joint arthrocentesis: R subacromial bursa    Performed by: Bebeto Carias DO  Authorized by: Bebeto Carias DO    Universal Protocol:  Consent given by: patient  Time out: Immediately prior to procedure a \"time out\" was called to verify the correct patient, procedure, equipment, support staff and site/side marked as required.  Site marked: the operative site was marked  Supporting " Documentation  Indications: pain     Is this a Visco injection? NoProcedure Details  Location: shoulder - R subacromial bursa  Preparation: Patient was prepped and draped in the usual sterile fashion  Needle size: 22 G  Ultrasound guidance: no  Approach: posterior  Medications administered: 40 mg dexamethasone 100 mg/10 mL; 4 mL lidocaine 1 %    Patient tolerance: patient tolerated the procedure well with no immediate complications  Dressing:  Sterile dressing applied            Scribe Attestation      I,:  Félix Caba am acting as a scribe while in the presence of the attending physician.:       I,:  Bebeto Carias DO personally performed the services described in this documentation    as scribed in my presence.:                   [1]   Past Medical History:  Diagnosis Date    Anxiety 1999    retired    Arthritis 2010    Cancer (HCC) 1999    removed    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    Urinary tract infection 2005   [2]   Past Surgical History:  Procedure Laterality Date    BRAIN SURGERY      CARDIAC CATHETERIZATION  1989    COLONOSCOPY  2012    clear    CORONARY ANGIOPLASTY  1989    CORONARY ARTERY BYPASS GRAFT      EPIDURAL BLOCK INJECTION N/A 01/04/2023    Procedure: BLOCK / INJECTION EPIDURAL STEROID LUMBAR L5-S1;  Surgeon: Hussain Gonzalez DO;  Location: [facility] MAIN OR;  Service: Pain Management     EYE SURGERY  2015    cataract    FLEXIBLE SIGMOIDOSCOPY  1992    clear    LUMBAR EPIDURAL INJECTION N/A 11/09/2022    Procedure: L5 S1 LUMBAR epidural steroid injection ( 63420);  Surgeon: Hussain  Ned Gonzalez, ;  Location: [facility] MAIN OR;  Service: Pain Management     LUMBAR LAMINECTOMY  2023    ORTHOPEDIC SURGERY  200    carpal Tunnel X2    URINARY SURGERY     [3]   Family History  Problem Relation Name Age of Onset    Alzheimer's disease Mother Michelle     Arthritis Mother Michelle     Hearing loss Mother Michelle     Heart disease Father Dad     Duffy's esophagus Father Dad     Stroke Maternal Grandmother Darby Pace     Heart disease Paternal Grandfather Yonas Lakhani     Mental illness Neg Hx     [4]   Social History  Tobacco Use    Smoking status: Former     Current packs/day: 0.00     Average packs/day: 1 pack/day for 15.0 years (15.0 ttl pk-yrs)     Types: Cigarettes     Start date: 1957     Quit date: 1972     Years since quittin.5    Smokeless tobacco: Never   Vaping Use    Vaping status: Never Used   Substance Use Topics    Alcohol use: Yes     Alcohol/week: 2.0 standard drinks of alcohol     Types: 2 Shots of liquor per week     Comment: 2 drinks of gin daily    Drug use: Never   [5]   Current Outpatient Medications:     amLODIPine (NORVASC) 2.5 mg tablet, TAKE ONE TABLET BY MOUTH EVERY DAY, Disp: 90 tablet, Rfl: 1    ASPIRIN 81 PO, Take by mouth, Disp: , Rfl:     atorvastatin (LIPITOR) 80 mg tablet, TAKE ONE TABLET BY MOUTH EVERY DAY, Disp: 30 tablet, Rfl: 0    Cholecalciferol 50 MCG (2000 UT) TABS, Take 1,000 Units by mouth in the morning., Disp: , Rfl:     Empagliflozin (JARDIANCE) 10 MG TABS tablet, Take 1 tablet (10 mg total) by mouth every morning, Disp: 90 tablet, Rfl: 1    Loperamide HCl (IMODIUM PO), Take 0.5 tablets by mouth as needed, Disp: , Rfl:     losartan (COZAAR) 100 MG tablet, TAKE ONE TABLET BY MOUTH EVERY DAY, Disp: 90 tablet, Rfl: 1    metoprolol succinate (TOPROL-XL) 50 mg 24 hr tablet, TAKE ONE TABLET BY MOUTH EVERY DAY, Disp: 90 tablet, Rfl: 1    Multiple Vitamins-Minerals (PRESERVISION AREDS 2 PO), Take by mouth, Disp: , Rfl:     niacin 500 mg tablet,  in the morning and in the evening., Disp: , Rfl:     tamsulosin (FLOMAX) 0.4 mg, TAKE ONE CAPSULE BY MOUTH EVERY DAY WITH DINNER (GENERIC FOR FLOMAX), Disp: 90 capsule, Rfl: 0    nitroglycerin (NITROLINGUAL) 0.4 mg/spray spray, Place 1 spray under the tongue every 5 (five) minutes as needed for chest pain (Patient not taking: Reported on 7/22/2025), Disp: 4.9 g, Rfl: 1    sertraline (ZOLOFT) 50 mg tablet, TAKE 1 AND 1/2 TABLETS BY MOUTH EVERY DAY, Disp: 135 tablet, Rfl: 1  [6]   Allergies  Allergen Reactions    Sulfa Antibiotics Irritability    Erythromycin Other (See Comments)    Sulfasalazine Other (See Comments)

## (undated) DEVICE — RADIOLOGY STERILE LABELS: Brand: CENTURION

## (undated) DEVICE — PLASTIC ADHESIVE BANDAGE: Brand: CURITY

## (undated) DEVICE — IV SET EXT SM BORE CARESITE 8IN

## (undated) DEVICE — CHLORAPREP APPLICATOR TINTED 10.5ML ONE-STEP

## (undated) DEVICE — GLOVE SRG BIOGEL 7.5

## (undated) DEVICE — SYRINGE EPI 8ML LUER SLIP LOSS OF RESISTANCE PLASTIC PERFIX

## (undated) DEVICE — TOWEL SET X-RAY

## (undated) DEVICE — TRAY EPIDURAL PERIFIX 20GA X 3.5IN TUOHY 8ML

## (undated) DEVICE — TRAY PAIN SUPPORT

## (undated) DEVICE — SYRINGE 5ML LL